# Patient Record
Sex: FEMALE | Race: WHITE | NOT HISPANIC OR LATINO | Employment: PART TIME | URBAN - METROPOLITAN AREA
[De-identification: names, ages, dates, MRNs, and addresses within clinical notes are randomized per-mention and may not be internally consistent; named-entity substitution may affect disease eponyms.]

---

## 2017-10-13 LAB — EXTERNAL HIV SCREEN: NORMAL

## 2018-04-23 LAB
CREATININE, RANDOM URINE (HISTORICAL): 141.9 MG/DL (ref 50–200)
PROT UR-MCNC: 24 MG/DL
PROT/CREAT UR: 169 MG/G

## 2018-04-25 LAB
ABSOL LYMPHOCYTES (HISTORICAL): 2.8 K/UL (ref 0.5–4)
ALBUMIN SERPL BCP-MCNC: 3.2 G/DL (ref 3–5.2)
ALP SERPL-CCNC: 163 U/L (ref 43–122)
ALT SERPL W P-5'-P-CCNC: 24 U/L (ref 9–52)
ANION GAP SERPL CALCULATED.3IONS-SCNC: 11 MMOL/L (ref 5–14)
AST SERPL W P-5'-P-CCNC: 19 U/L (ref 14–36)
BASOPHILS # BLD AUTO: 0 % (ref 0–1)
BASOPHILS # BLD AUTO: 0 K/UL (ref 0–0.1)
BILIRUB SERPL-MCNC: 0.2 MG/DL
BUN SERPL-MCNC: 10 MG/DL (ref 5–25)
CALCIUM SERPL-MCNC: 9.2 MG/DL (ref 8.4–10.2)
CHLORIDE SERPL-SCNC: 105 MEQ/L (ref 97–108)
CO2 SERPL-SCNC: 19 MMOL/L (ref 22–30)
CREATINE, SERUM (HISTORICAL): 0.7 MG/DL (ref 0.6–1.2)
DEPRECATED RDW RBC AUTO: 13.2 %
EGFR (HISTORICAL): >60 ML/MIN/1.73 M2
EOSINOPHIL # BLD AUTO: 0.1 K/UL (ref 0–0.4)
EOSINOPHIL NFR BLD AUTO: 1 % (ref 0–6)
GLUCOSE SERPL-MCNC: 123 MG/DL (ref 70–99)
HCT VFR BLD AUTO: 31.6 % (ref 36–46)
HGB BLD-MCNC: 10.8 G/DL (ref 12–16)
LYMPHOCYTES NFR BLD AUTO: 21 % (ref 25–45)
MCH RBC QN AUTO: 31 PG (ref 26–34)
MCHC RBC AUTO-ENTMCNC: 34.1 % (ref 31–36)
MCV RBC AUTO: 91 FL (ref 80–100)
MONOCYTES # BLD AUTO: 0.7 K/UL (ref 0.2–0.9)
MONOCYTES NFR BLD AUTO: 6 % (ref 1–10)
NEUTROPHILS ABS COUNT (HISTORICAL): 10 K/UL (ref 1.8–7.8)
NEUTS SEG NFR BLD AUTO: 72 % (ref 45–65)
PLATELET # BLD AUTO: 278 K/MCL (ref 150–450)
POTASSIUM SERPL-SCNC: 3.9 MEQ/L (ref 3.6–5)
RBC # BLD AUTO: 3.47 M/MCL (ref 4–5.2)
SODIUM SERPL-SCNC: 135 MEQ/L (ref 137–147)
TOTAL PROTEIN (HISTORICAL): 5.6 G/DL (ref 5.9–8.4)
WBC # BLD AUTO: 13.7 K/MCL (ref 4.5–11)

## 2018-10-04 ENCOUNTER — OFFICE VISIT (OUTPATIENT)
Dept: FAMILY MEDICINE CLINIC | Facility: CLINIC | Age: 28
End: 2018-10-04
Payer: COMMERCIAL

## 2018-10-04 VITALS
SYSTOLIC BLOOD PRESSURE: 140 MMHG | WEIGHT: 238 LBS | HEIGHT: 69 IN | OXYGEN SATURATION: 97 % | BODY MASS INDEX: 35.25 KG/M2 | HEART RATE: 68 BPM | DIASTOLIC BLOOD PRESSURE: 84 MMHG | RESPIRATION RATE: 16 BRPM | TEMPERATURE: 97.9 F

## 2018-10-04 DIAGNOSIS — Z86.59 HISTORY OF POSTPARTUM DEPRESSION: Primary | ICD-10-CM

## 2018-10-04 DIAGNOSIS — E66.01 CLASS 2 SEVERE OBESITY DUE TO EXCESS CALORIES WITH SERIOUS COMORBIDITY AND BODY MASS INDEX (BMI) OF 35.0 TO 35.9 IN ADULT (HCC): ICD-10-CM

## 2018-10-04 DIAGNOSIS — Z23 NEED FOR INFLUENZA VACCINATION: ICD-10-CM

## 2018-10-04 DIAGNOSIS — Z87.59 HISTORY OF POSTPARTUM DEPRESSION: Primary | ICD-10-CM

## 2018-10-04 DIAGNOSIS — I10 CHRONIC HYPERTENSION: ICD-10-CM

## 2018-10-04 PROBLEM — E66.9 CLASS 2 OBESITY IN ADULT: Status: ACTIVE | Noted: 2018-10-04

## 2018-10-04 PROBLEM — O14.13 PREECLAMPSIA, SEVERE, THIRD TRIMESTER: Status: ACTIVE | Noted: 2018-05-07

## 2018-10-04 PROBLEM — E66.812 CLASS 2 OBESITY IN ADULT: Status: ACTIVE | Noted: 2018-10-04

## 2018-10-04 PROCEDURE — 90682 RIV4 VACC RECOMBINANT DNA IM: CPT

## 2018-10-04 PROCEDURE — 90471 IMMUNIZATION ADMIN: CPT

## 2018-10-04 PROCEDURE — 99214 OFFICE O/P EST MOD 30 MIN: CPT | Performed by: FAMILY MEDICINE

## 2018-10-04 RX ORDER — LOSARTAN POTASSIUM 100 MG/1
100 TABLET ORAL DAILY
Qty: 30 TABLET | Refills: 1 | Status: SHIPPED | OUTPATIENT
Start: 2018-10-04 | End: 2018-12-10 | Stop reason: SDUPTHER

## 2018-10-04 RX ORDER — LABETALOL 100 MG/1
100 TABLET, FILM COATED ORAL
COMMUNITY
Start: 2018-05-10 | End: 2018-10-04 | Stop reason: ALTCHOICE

## 2018-10-04 RX ORDER — ESCITALOPRAM OXALATE 10 MG/1
10 TABLET ORAL DAILY
Qty: 30 TABLET | Refills: 0 | Status: SHIPPED | OUTPATIENT
Start: 2018-10-04 | End: 2018-12-10 | Stop reason: SDUPTHER

## 2018-10-04 RX ORDER — DIPHENOXYLATE HYDROCHLORIDE AND ATROPINE SULFATE 2.5; .025 MG/1; MG/1
1 TABLET ORAL
COMMUNITY
End: 2019-11-27

## 2018-10-04 NOTE — ASSESSMENT & PLAN NOTE
Symptomatic start the patient on Lexapro 10 mg once a day proper use of medication possible side effect discussed with the patient

## 2018-10-04 NOTE — PATIENT INSTRUCTIONS

## 2018-10-04 NOTE — ASSESSMENT & PLAN NOTE
Patient on labetalol with the patient history of fatigue and depression will stop the medication will put her on the losartan 100 mg once a day proper use of medication discussed with the patient will check her BMP in 2-3 week

## 2018-10-04 NOTE — ASSESSMENT & PLAN NOTE
Chronic uncontrolled was multiple trial of different kind of diet will start the patient on Belviq 10 mg 1 tablet twice a day proper use of medication discussed with the patient we encouraged patient to continue with the low carb diet increase activity 30 min a day 5 days a week

## 2018-10-04 NOTE — PROGRESS NOTES
Subjective:   Chief Complaint   Patient presents with    Obesity     wants something to help lose         Patient ID: Mitra Mendez is a 25 y o  female  Patient here with multiple concern  1-patient concerned about her weight she has been gaining weight despite she been trying to lose weight by following different kind of diet and doing some activity patient she had 5 from months old baby but the patient she gain more than 15 lb even from her weight when she was pregnant and deny any the dry skin no tremor and no headache no blurred vision no weakness or lateralized of the symptom  2-the patient for more than couple months and since then she had the baby and patient she had history of postpartum depression previously she been trying to deal with it and treated to conservatively was exercise and activity but is not helping affect her quality of the life patient deny any suicide attempt or idea she deny any ideas to hurt herself or the baby patient was prescribed the Zoloft by her Ob and she took for couple months and make her more depressed so patient she stop it        The following portions of the patient's history were reviewed and updated as appropriate: allergies, current medications, past family history, past medical history, past social history, past surgical history and problem list     Review of Systems   Constitutional: Negative for fatigue and fever  HENT: Negative for ear pain, sinus pain, sinus pressure and sore throat  Eyes: Negative for pain and redness  Respiratory: Negative for cough, chest tightness and shortness of breath  Cardiovascular: Negative for chest pain, palpitations and leg swelling  Gastrointestinal: Negative for abdominal pain, blood in stool, constipation, diarrhea and nausea  Genitourinary: Negative for flank pain, frequency and hematuria  Musculoskeletal: Negative for back pain and joint swelling  Skin: Negative for rash     Neurological: Negative for dizziness, numbness and headaches  Hematological: Does not bruise/bleed easily  Psychiatric/Behavioral: Negative for agitation and behavioral problems  Objective:  Vitals:    10/04/18 1402   BP: 140/84   BP Location: Left arm   Patient Position: Sitting   Cuff Size: Large   Pulse: 68   Resp: 16   Temp: 97 9 °F (36 6 °C)   TempSrc: Oral   SpO2: 97%   Weight: 108 kg (238 lb)   Height: 5' 9" (1 753 m)      Physical Exam   Constitutional: She is oriented to person, place, and time  She appears well-developed and well-nourished  HENT:   Head: Normocephalic  Right Ear: External ear normal    Left Ear: External ear normal    Eyes: Conjunctivae and EOM are normal  Right eye exhibits no discharge  Left eye exhibits no discharge  Neck: No JVD present  Cardiovascular: Normal rate, regular rhythm and normal heart sounds  Exam reveals no gallop  No murmur heard  Pulmonary/Chest: Effort normal  No respiratory distress  She has no wheezes  She has no rales  She exhibits no tenderness  Abdominal: She exhibits no mass  There is no tenderness  There is no rebound  Musculoskeletal: She exhibits no edema or tenderness  Neurological: She is alert and oriented to person, place, and time  Skin: No rash noted  No erythema           Assessment/Plan:    History of postpartum depression  Symptomatic start the patient on Lexapro 10 mg once a day proper use of medication possible side effect discussed with the patient    Chronic hypertension  Patient on labetalol with the patient history of fatigue and depression will stop the medication will put her on the losartan 100 mg once a day proper use of medication discussed with the patient will check her BMP in 2-3 week    Class 2 obesity in adult  Chronic uncontrolled was multiple trial of different kind of diet will start the patient on Belviq 10 mg 1 tablet twice a day proper use of medication discussed with the patient we encouraged patient to continue with the low carb diet increase activity 30 min a day 5 days a week       Diagnoses and all orders for this visit:    History of postpartum depression  -     escitalopram (LEXAPRO) 10 mg tablet; Take 1 tablet (10 mg total) by mouth daily    Class 2 severe obesity due to excess calories with serious comorbidity and body mass index (BMI) of 35 0 to 35 9 in adult ([facility])  -     Lorcaserin HCl (BELVIQ) 10 MG TABS; Take 1 tablet (10 mg total) by mouth 2 (two) times a day  -     CBC and differential; Future  -     Comprehensive metabolic panel; Future  -     Lipid Panel with Direct LDL reflex; Future  -     TSH, 3rd generation with Free T4 reflex; Future    Need for influenza vaccination  -     influenza vaccine, [id number], quadrivalent, recombinant, PF, 0 5 mL, for patients 18-49 yr with comorbidities ([facility])    Chronic hypertension  -     losartan (COZAAR) 100 MG tablet; Take 1 tablet (100 mg total) by mouth daily  -     CBC and differential; Future  -     Comprehensive metabolic panel; Future  -     Lipid Panel with Direct LDL reflex; Future  -     TSH, 3rd generation with Free T4 reflex; Future    Other orders  -     Discontinue: labetalol (NORMODYNE) 100 mg tablet;  Take 100 mg by mouth  -     multivitamin (THERAGRAN) TABS; Take 1 tablet by mouth

## 2018-10-08 ENCOUNTER — TRANSCRIBE ORDERS (OUTPATIENT)
Dept: ADMINISTRATIVE | Facility: HOSPITAL | Age: 28
End: 2018-10-08

## 2018-10-08 ENCOUNTER — APPOINTMENT (OUTPATIENT)
Dept: LAB | Facility: HOSPITAL | Age: 28
End: 2018-10-08
Payer: COMMERCIAL

## 2018-10-08 DIAGNOSIS — E66.01 CLASS 2 SEVERE OBESITY DUE TO EXCESS CALORIES WITH SERIOUS COMORBIDITY AND BODY MASS INDEX (BMI) OF 35.0 TO 35.9 IN ADULT (HCC): ICD-10-CM

## 2018-10-08 DIAGNOSIS — I10 CHRONIC HYPERTENSION: ICD-10-CM

## 2018-10-08 LAB
ALBUMIN SERPL BCP-MCNC: 3.9 G/DL (ref 3–5.2)
ALP SERPL-CCNC: 90 U/L (ref 43–122)
ALT SERPL W P-5'-P-CCNC: 40 U/L (ref 9–52)
ANION GAP SERPL CALCULATED.3IONS-SCNC: 7 MMOL/L (ref 5–14)
AST SERPL W P-5'-P-CCNC: 30 U/L (ref 14–36)
BASOPHILS # BLD AUTO: 0 THOUSANDS/ΜL (ref 0–0.1)
BASOPHILS NFR BLD AUTO: 1 % (ref 0–1)
BILIRUB SERPL-MCNC: 0.3 MG/DL
BUN SERPL-MCNC: 17 MG/DL (ref 5–25)
CALCIUM SERPL-MCNC: 8.9 MG/DL (ref 8.4–10.2)
CHLORIDE SERPL-SCNC: 107 MMOL/L (ref 97–108)
CHOLEST SERPL-MCNC: 186 MG/DL
CO2 SERPL-SCNC: 24 MMOL/L (ref 22–30)
CREAT SERPL-MCNC: 0.71 MG/DL (ref 0.6–1.2)
EOSINOPHIL # BLD AUTO: 0.2 THOUSAND/ΜL (ref 0–0.4)
EOSINOPHIL NFR BLD AUTO: 2 % (ref 0–6)
ERYTHROCYTE [DISTWIDTH] IN BLOOD BY AUTOMATED COUNT: 12.6 %
GFR SERPL CREATININE-BSD FRML MDRD: 115 ML/MIN/1.73SQ M
GLUCOSE P FAST SERPL-MCNC: 97 MG/DL (ref 70–99)
HCT VFR BLD AUTO: 39.7 % (ref 36–46)
HDLC SERPL-MCNC: 36 MG/DL (ref 40–59)
HGB BLD-MCNC: 13.4 G/DL (ref 12–16)
LDLC SERPL CALC-MCNC: 119 MG/DL
LYMPHOCYTES # BLD AUTO: 2.8 THOUSANDS/ΜL (ref 0.5–4)
LYMPHOCYTES NFR BLD AUTO: 28 % (ref 20–50)
MCH RBC QN AUTO: 31.8 PG (ref 26–34)
MCHC RBC AUTO-ENTMCNC: 33.9 G/DL (ref 31–36)
MCV RBC AUTO: 94 FL (ref 80–100)
MONOCYTES # BLD AUTO: 0.6 THOUSAND/ΜL (ref 0.2–0.9)
MONOCYTES NFR BLD AUTO: 6 % (ref 1–10)
NEUTROPHILS # BLD AUTO: 6.1 THOUSANDS/ΜL (ref 1.8–7.8)
NEUTS SEG NFR BLD AUTO: 63 % (ref 45–65)
PLATELET # BLD AUTO: 283 THOUSANDS/UL (ref 150–450)
PLATELET BLD QL SMEAR: ADEQUATE
PMV BLD AUTO: 9.3 FL (ref 8.9–12.7)
POTASSIUM SERPL-SCNC: 4.2 MMOL/L (ref 3.6–5)
PROT SERPL-MCNC: 6.6 G/DL (ref 5.9–8.4)
RBC # BLD AUTO: 4.23 MILLION/UL (ref 4–5.2)
RBC MORPH BLD: NORMAL
SODIUM SERPL-SCNC: 138 MMOL/L (ref 137–147)
TRIGL SERPL-MCNC: 156 MG/DL
TSH SERPL DL<=0.05 MIU/L-ACNC: 0.83 UIU/ML (ref 0.47–4.68)
WBC # BLD AUTO: 9.7 THOUSAND/UL (ref 4.5–11)

## 2018-10-08 PROCEDURE — 80053 COMPREHEN METABOLIC PANEL: CPT

## 2018-10-08 PROCEDURE — 84443 ASSAY THYROID STIM HORMONE: CPT

## 2018-10-08 PROCEDURE — 36415 COLL VENOUS BLD VENIPUNCTURE: CPT

## 2018-10-08 PROCEDURE — 85025 COMPLETE CBC W/AUTO DIFF WBC: CPT

## 2018-10-08 PROCEDURE — 80061 LIPID PANEL: CPT

## 2018-11-01 PROBLEM — N20.0 KIDNEY STONE: Status: ACTIVE | Noted: 2018-01-26

## 2018-11-05 ENCOUNTER — OFFICE VISIT (OUTPATIENT)
Dept: FAMILY MEDICINE CLINIC | Facility: CLINIC | Age: 28
End: 2018-11-05
Payer: COMMERCIAL

## 2018-11-05 VITALS
DIASTOLIC BLOOD PRESSURE: 70 MMHG | BODY MASS INDEX: 36.07 KG/M2 | WEIGHT: 238 LBS | OXYGEN SATURATION: 98 % | SYSTOLIC BLOOD PRESSURE: 120 MMHG | HEIGHT: 68 IN | TEMPERATURE: 98.1 F | HEART RATE: 78 BPM

## 2018-11-05 DIAGNOSIS — E66.01 CLASS 2 SEVERE OBESITY DUE TO EXCESS CALORIES WITH SERIOUS COMORBIDITY AND BODY MASS INDEX (BMI) OF 35.0 TO 35.9 IN ADULT (HCC): Primary | ICD-10-CM

## 2018-11-05 DIAGNOSIS — I10 CHRONIC HYPERTENSION: ICD-10-CM

## 2018-11-05 PROCEDURE — 3074F SYST BP LT 130 MM HG: CPT | Performed by: FAMILY MEDICINE

## 2018-11-05 PROCEDURE — 99214 OFFICE O/P EST MOD 30 MIN: CPT | Performed by: FAMILY MEDICINE

## 2018-11-05 PROCEDURE — 3078F DIAST BP <80 MM HG: CPT | Performed by: FAMILY MEDICINE

## 2018-11-05 PROCEDURE — 3008F BODY MASS INDEX DOCD: CPT | Performed by: FAMILY MEDICINE

## 2018-11-05 RX ORDER — MOMETASONE FUROATE 50 UG/1
SPRAY, METERED NASAL
COMMUNITY
Start: 2018-01-26 | End: 2019-04-10 | Stop reason: SDUPTHER

## 2018-11-05 NOTE — PROGRESS NOTES
Subjective:   Chief Complaint   Patient presents with    Follow-up     medication change        Patient ID: Nelson Grayson is a 25 y o  female  Patient follow-up with a chronic condition patient's history of hypertension we did stop her labetalol secondary side effect include the depression and fatigue and will put her on losartan 100 mg patient tolerated the medication well without any side effect and the blood pressure well controlled deny any chest pain short of breath no palpitation no headache no blurred vision no weakness or lateralized of the symptom  Patient's history of obesity we did start the patient on Belviq 10 mg twice a day the patient insurance does not cover the medication and the with the cost it is high patient could not start the medication the patient thus treated with her weight tried different kind of diet nothing helped and the even try to do exercise and also did not help        The following portions of the patient's history were reviewed and updated as appropriate: allergies, current medications, past family history, past medical history, past social history, past surgical history and problem list     Review of Systems   Constitutional: Negative for fatigue and fever  HENT: Negative for ear pain, sinus pain, sinus pressure and sore throat  Eyes: Negative for pain and redness  Respiratory: Negative for cough, chest tightness and shortness of breath  Cardiovascular: Negative for chest pain, palpitations and leg swelling  Gastrointestinal: Negative for abdominal pain, blood in stool, constipation, diarrhea and nausea  Genitourinary: Negative for flank pain, frequency and hematuria  Musculoskeletal: Negative for back pain and joint swelling  Skin: Negative for rash  Neurological: Negative for dizziness, numbness and headaches  Hematological: Does not bruise/bleed easily           Objective:  Vitals:    11/05/18 1319   BP: 120/70   Pulse: 78   Temp: 98 1 °F (36 7 °C) TempSrc: Oral   SpO2: 98%   Weight: 108 kg (238 lb)   Height: 5' 8" (1 727 m)      Physical Exam   Constitutional: She is oriented to person, place, and time  She appears well-developed and well-nourished  HENT:   Head: Normocephalic  Right Ear: External ear normal    Left Ear: External ear normal    Eyes: Conjunctivae and EOM are normal  Right eye exhibits no discharge  Left eye exhibits no discharge  Neck: No JVD present  Cardiovascular: Normal rate, regular rhythm and normal heart sounds  Exam reveals no gallop  No murmur heard  Pulmonary/Chest: Effort normal  No respiratory distress  She has no wheezes  She has no rales  She exhibits no tenderness  Abdominal: She exhibits no mass  There is no tenderness  There is no rebound  Musculoskeletal: She exhibits no edema or tenderness  Neurological: She is alert and oriented to person, place, and time  Skin: No rash noted  No erythema  Assessment/Plan:    Class 2 obesity in adult  Chronic uncontrolled patient could not take care Belviq secondary to the cost of the medication and patient BMI above 35 and she had comorbidity hypertension the patient will qualify for bariatric surgery will refer the patient to bariatric team we encouraged patient increased activity 30 minutes a day 5 days a week and a portion control    Chronic hypertension  Chronic well controlled continue current the patient tolerate the losartan without side effect blood pressure well controlled will continue current dose   low-salt diet less than 2 g a day ,   low caffeine intake   regular aerobic exercise 20 to totally minute a day diet and important lose weight discussed with the patient         Diagnoses and all orders for this visit:    Class 2 severe obesity due to excess calories with serious comorbidity and body mass index (BMI) of 35 0 to 35 9 in adult [facility])  -     Ambulatory referral to Bariatric Surgery;  Future    Chronic hypertension  -     Basic metabolic panel; Future    Other orders  -     mometasone (NASONEX) 50 mcg/act nasal spray; spray 2 spray by intranasal route  every day in each nostril

## 2018-11-06 NOTE — ASSESSMENT & PLAN NOTE
Chronic uncontrolled patient could not take care Belviq secondary to the cost of the medication and patient BMI above 35 and she had comorbidity hypertension the patient will qualify for bariatric surgery will refer the patient to bariatric team we encouraged patient increased activity 30 minutes a day 5 days a week and a portion control

## 2018-11-06 NOTE — ASSESSMENT & PLAN NOTE
Chronic well controlled continue current the patient tolerate the losartan without side effect blood pressure well controlled will continue current dose   low-salt diet less than 2 g a day ,   low caffeine intake   regular aerobic exercise 20 to totally minute a day diet and important lose weight discussed with the patient

## 2018-12-10 DIAGNOSIS — Z86.59 HISTORY OF POSTPARTUM DEPRESSION: ICD-10-CM

## 2018-12-10 DIAGNOSIS — I10 CHRONIC HYPERTENSION: ICD-10-CM

## 2018-12-10 DIAGNOSIS — Z87.59 HISTORY OF POSTPARTUM DEPRESSION: ICD-10-CM

## 2018-12-10 RX ORDER — ESCITALOPRAM OXALATE 10 MG/1
10 TABLET ORAL DAILY
Qty: 30 TABLET | Refills: 0 | Status: SHIPPED | OUTPATIENT
Start: 2018-12-10 | End: 2019-04-02 | Stop reason: SDUPTHER

## 2018-12-10 RX ORDER — LOSARTAN POTASSIUM 100 MG/1
100 TABLET ORAL DAILY
Qty: 30 TABLET | Refills: 0 | Status: SHIPPED | OUTPATIENT
Start: 2018-12-10 | End: 2019-04-02 | Stop reason: SDUPTHER

## 2018-12-21 ENCOUNTER — OFFICE VISIT (OUTPATIENT)
Dept: FAMILY MEDICINE CLINIC | Facility: CLINIC | Age: 28
End: 2018-12-21
Payer: COMMERCIAL

## 2018-12-21 VITALS
HEART RATE: 88 BPM | BODY MASS INDEX: 35.55 KG/M2 | HEIGHT: 69 IN | DIASTOLIC BLOOD PRESSURE: 94 MMHG | SYSTOLIC BLOOD PRESSURE: 146 MMHG | WEIGHT: 240 LBS | TEMPERATURE: 98.1 F | OXYGEN SATURATION: 97 % | RESPIRATION RATE: 16 BRPM

## 2018-12-21 DIAGNOSIS — I10 CHRONIC HYPERTENSION: ICD-10-CM

## 2018-12-21 DIAGNOSIS — R06.83 SNORING: ICD-10-CM

## 2018-12-21 DIAGNOSIS — Z00.01 ENCOUNTER FOR WELL ADULT EXAM WITH ABNORMAL FINDINGS: Primary | ICD-10-CM

## 2018-12-21 DIAGNOSIS — Z72.0 TOBACCO ABUSE: ICD-10-CM

## 2018-12-21 DIAGNOSIS — Z23 NEED FOR 23-POLYVALENT PNEUMOCOCCAL POLYSACCHARIDE VACCINE: ICD-10-CM

## 2018-12-21 DIAGNOSIS — E66.01 CLASS 2 SEVERE OBESITY DUE TO EXCESS CALORIES WITH SERIOUS COMORBIDITY AND BODY MASS INDEX (BMI) OF 35.0 TO 35.9 IN ADULT (HCC): ICD-10-CM

## 2018-12-21 PROCEDURE — 99214 OFFICE O/P EST MOD 30 MIN: CPT | Performed by: FAMILY MEDICINE

## 2018-12-21 PROCEDURE — 99406 BEHAV CHNG SMOKING 3-10 MIN: CPT | Performed by: FAMILY MEDICINE

## 2018-12-21 PROCEDURE — 99395 PREV VISIT EST AGE 18-39: CPT | Performed by: FAMILY MEDICINE

## 2018-12-21 PROCEDURE — 3008F BODY MASS INDEX DOCD: CPT | Performed by: FAMILY MEDICINE

## 2018-12-21 PROCEDURE — 90471 IMMUNIZATION ADMIN: CPT | Performed by: FAMILY MEDICINE

## 2018-12-21 PROCEDURE — 3725F SCREEN DEPRESSION PERFORMED: CPT | Performed by: FAMILY MEDICINE

## 2018-12-21 PROCEDURE — 90732 PPSV23 VACC 2 YRS+ SUBQ/IM: CPT | Performed by: FAMILY MEDICINE

## 2018-12-21 RX ORDER — AMLODIPINE BESYLATE 5 MG/1
5 TABLET ORAL DAILY
Qty: 30 TABLET | Refills: 1 | Status: SHIPPED | OUTPATIENT
Start: 2018-12-21 | End: 2019-04-02 | Stop reason: SDUPTHER

## 2018-12-21 NOTE — PROGRESS NOTES
[facility] HEALTH MAINTENANCE OFFICE VISIT  [facility]    NAME: Nelson Grayson  AGE: 25 y o  SEX: female  : 1990     DATE: 2018    Assessment and Plan     Problem List Items Addressed This Visit     Chronic hypertension     Chronic asymptomatic uncontrolled patient on losartan 100 mg once a day will add amlodipine 5 mg once a day proper use of medication possible side effect discussed with the patient we encouraged patient to lose weight and watch for low-salt diet         Relevant Medications    amLODIPine (NORVASC) 5 mg tablet    Other Relevant Orders    Ambulatory referral to Bariatric Surgery    Class 2 obesity in adult     The BMI is above average will refer the patient to bariatric team patient BMI above 35 and had comorbidity including hypertension   BMI counseling and education was provided to the patient  Nutrition recommendations include reducing portion sizes, decreasing overall calorie intake, 3-5 servings of fruits/vegetables daily, reducing fast food intake, consuming healthier snacks, decreasing soda and/or juice intake, moderation in carbohydrate intake and reducing intake of saturated fat and trans fat  Exercise recommendations include moderate aerobic physical activity for 150 minutes/week, exercising 3-5 times per week and joining a gym           Relevant Orders    Ambulatory referral to Sleep Medicine    Ambulatory referral to Bariatric Surgery    Snoring     Symptomatic plan to do a sleep study to rule out sleep apnea we discussed with the patient important lose weight and also we discussed with the patient a sleep position         Relevant Orders    Ambulatory referral to Sleep Medicine    Encounter for well adult exam with abnormal findings - Primary     Advice and education were given regarding nutrition, aerobic exercises, weight bearing exercises, cardiovascular risk reduction, fall risk reduction, and age appropriate supplements  The patient was counseled regarding instructions for management, risk factor reductions, prognosis, risks and benefits of treatment options, patient and family education, and importance of compliance with treatment  Tobacco abuse     Chronic patient has been smoking for more than 10 years patient did try to quit smoking before and [place] and the she went through stress in life and the restart smoke again at deny any chest pain short of breath her we discussed with the patient important stop smoking and the side effect of smoking her health increase the risk for multiple kind of cancer we offer her nicotine patch and she declined for today we spent more than 4 min discussed with the patient smoking cessation           Other Visit Diagnoses     Need for 23-polyvalent pneumococcal polysaccharide vaccine        Relevant Orders    Pneumococcal Polysaccharide Vaccine 23-Valent =>1yo SQ IM (Completed)            · Patient Counseling:   · Nutrition: Stressed importance of a well balanced diet, moderation of sodium/saturated fat, caloric balance and sufficient intake of fiber  · Exercise: Stressed the importance of regular exercise with a goal of 150 minutes per week  · Dental Health: Discussed daily flossing and brushing and regular dental visits     · Immunizations reviewed patient is due for Pneumovax 23  · Discussed benefits of screening The patient had a Pap smear in 2017 and was within normal limits  BMI Counseling: Body mass index is 35 44 kg/m²  Discussed with patient's BMI with her            Chief Complaint     Chief Complaint   Patient presents with    Follow-up     chronic conditions     Physical Exam     yearly        History of Present Illness     Patient office for annual physical exam and concerned about the snoring and high blood pressure  Deny any chest pain short of breath no palpitation no headache no blurred vision no weakness or lateralized of the symptom no abdomen pain no nausea vomiting or diarrhea no renal problem no rash no fever no change in the weight and no change in the mood patient does do low carb diet does not do exercise frequently she does smoke half pack a day for more than 10 years she did try to quit smoke before using addition DEXA but the she relapsed        Well Adult Physical   Patient here for a comprehensive physical exam       Diet and Physical Activity  Diet:   low carb diet  Weight concerns: Patient has class 2 obesity (BMI 35 0-39  9)  Exercise: infrequently      Depression Screen  PHQ-9 Depression Screening    PHQ-9:    Frequency of the following problems over the past two weeks:       Little interest or pleasure in doing things:  0 - not at all  Feeling down, depressed, or hopeless:  0 - not at all  PHQ-2 Score:  0          General Health  Hearing: Normal:  bilateral  Vision: wears glasses  Dental: regular dental visits    Reproductive Health  LMP was 11/27/18  G4L3      The following portions of the patient's history were reviewed and updated as appropriate: allergies, current medications, past family history, past medical history, past social history, past surgical history and problem list     Review of Systems     Review of Systems   Constitutional: Negative for fatigue and fever  HENT: Negative for ear pain, sinus pain, sinus pressure and sore throat  Eyes: Negative for pain and redness  Respiratory: Negative for cough, chest tightness and shortness of breath  Cardiovascular: Negative for chest pain, palpitations and leg swelling  Gastrointestinal: Negative for abdominal pain, blood in stool, constipation, diarrhea and nausea  Genitourinary: Negative for flank pain, frequency and hematuria  Musculoskeletal: Negative for back pain and joint swelling  Skin: Negative for rash  Neurological: Negative for dizziness, numbness and headaches  Hematological: Does not bruise/bleed easily     Psychiatric/Behavioral: Negative for agitation and behavioral problems  Past Medical History     Past Medical History:   Diagnosis Date    Hypertension     Kidney stone     Obesity        Past Surgical History     History reviewed  No pertinent surgical history  Social History     Social History     Social History    Marital status: Significant Other     Spouse name: N/A    Number of children: N/A    Years of education: N/A     Social History Main Topics    Smoking status: Heavy Tobacco Smoker     Packs/day: 0 50     Types: Cigarettes    Smokeless tobacco: Current User    Alcohol use Yes    Drug use: No    Sexual activity: Not Asked     Other Topics Concern    None     Social History Narrative    None       Family History     Family History   Problem Relation Age of Onset    Hypertension Family        Current Medications       Current Outpatient Prescriptions:     escitalopram (LEXAPRO) 10 mg tablet, Take 1 tablet (10 mg total) by mouth daily, Disp: 30 tablet, Rfl: 0    losartan (COZAAR) 100 MG tablet, Take 1 tablet (100 mg total) by mouth daily, Disp: 30 tablet, Rfl: 0    mometasone (NASONEX) 50 mcg/act nasal spray, spray 2 spray by intranasal route  every day in each nostril, Disp: , Rfl:     multivitamin (THERAGRAN) TABS, Take 1 tablet by mouth, Disp: , Rfl:     amLODIPine (NORVASC) 5 mg tablet, Take 1 tablet (5 mg total) by mouth daily, Disp: 30 tablet, Rfl: 1     Allergies     No Known Allergies    Objective     /94 (BP Location: Left arm, Patient Position: Sitting, Cuff Size: Large)   Pulse 88   Temp 98 1 °F (36 7 °C) (Oral)   Resp 16   Ht 5' 9" (1 753 m)   Wt 109 kg (240 lb)   SpO2 97%   BMI 35 44 kg/m²      Physical Exam   Constitutional: She is oriented to person, place, and time  She appears well-developed and well-nourished  HENT:   Head: Normocephalic  Right Ear: External ear normal    Left Ear: External ear normal    Eyes: Conjunctivae and EOM are normal  Right eye exhibits no discharge   Left eye exhibits no discharge  Neck: No JVD present  Cardiovascular: Normal rate, regular rhythm and normal heart sounds  Exam reveals no gallop  No murmur heard  Pulmonary/Chest: Effort normal  No respiratory distress  She has no wheezes  She has no rales  She exhibits no tenderness  Abdominal: She exhibits no mass  There is no tenderness  There is no rebound  Musculoskeletal: She exhibits no edema or tenderness  Neurological: She is alert and oriented to person, place, and time  Skin: No rash noted  No erythema  Psychiatric: She has a normal mood and affect   Her behavior is normal            Health Maintenance     Health Maintenance   Topic Date Due    Depression Screening PHQ  12/21/2019    DTaP,Tdap,and Td Vaccines (2 - Td) 02/12/2028    INFLUENZA VACCINE  Completed    Pneumococcal PPSV23 Medium Risk Adult  Completed     Immunization History   Administered Date(s) Administered    Influenza 10/10/2017    Influenza, recombinant, quadrivalent,injectable, preservative free 10/04/2018    Pneumococcal Polysaccharide PPV23 12/21/2018    Tdap 02/12/2018       Lexie Jones MD  [address] [place] [address]

## 2018-12-21 NOTE — PROGRESS NOTES
Subjective:   Chief Complaint   Patient presents with    Follow-up     chronic conditions     Physical Exam     yearly         Patient ID: Bonnie Dao is a 25 y o  female  Patient and office for her yearly exam and she is concerned about the the snoring and the blood pressure patient now has been taking losartan 100 mg once a day and her blood pressure has been running high she deny any chest pain short of breath no palpitation no headache no blurred vision no weakness or lateralized of the symptom no syncope and patient does smoke she is compliant with her medication and she had her BMI 35 4  Patient also concerned about snoring has been reported by the patient and her  does not matter what sleep position she does snore and the patient the sometimes wake up at nighttime gasping for ear feel tired during the day has been trying to lose weight to help with that was not successful deny any lose control of her urine or stool and a during sleeping no sleep walking no sleep talking        The following portions of the patient's history were reviewed and updated as appropriate: allergies, current medications, past family history, past medical history, past social history, past surgical history and problem list     Review of Systems   Constitutional: Negative for fatigue and fever  HENT: Negative for ear pain, sinus pain, sinus pressure and sore throat  Eyes: Negative for pain and redness  Respiratory: Negative for cough, chest tightness and shortness of breath  Cardiovascular: Negative for chest pain, palpitations and leg swelling  Gastrointestinal: Negative for abdominal pain, blood in stool, constipation, diarrhea and nausea  Genitourinary: Negative for flank pain, frequency and hematuria  Musculoskeletal: Negative for back pain and joint swelling  Skin: Negative for rash  Neurological: Negative for dizziness, numbness and headaches  Hematological: Does not bruise/bleed easily  Psychiatric/Behavioral: Negative for agitation and behavioral problems  Objective:  Vitals:    12/21/18 1411   BP: 146/94   BP Location: Left arm   Patient Position: Sitting   Cuff Size: Large   Pulse: 88   Resp: 16   Temp: 98 1 °F (36 7 °C)   TempSrc: Oral   SpO2: 97%   Weight: 109 kg (240 lb)   Height: 5' 9" (1 753 m)      Physical Exam   Constitutional: She is oriented to person, place, and time  She appears well-developed and well-nourished  HENT:   Head: Normocephalic  Right Ear: External ear normal    Left Ear: External ear normal    Eyes: Conjunctivae and EOM are normal  Right eye exhibits no discharge  Left eye exhibits no discharge  Neck: No JVD present  Cardiovascular: Normal rate, regular rhythm and normal heart sounds  Exam reveals no gallop  No murmur heard  Pulmonary/Chest: Effort normal  No respiratory distress  She has no wheezes  She has no rales  She exhibits no tenderness  Abdominal: She exhibits no mass  There is no tenderness  There is no rebound  Musculoskeletal: She exhibits no edema or tenderness  Neurological: She is alert and oriented to person, place, and time  Skin: No rash noted  No erythema  Psychiatric: She has a normal mood and affect  Assessment/Plan:    Chronic hypertension  Chronic asymptomatic uncontrolled patient on losartan 100 mg once a day will add amlodipine 5 mg once a day proper use of medication possible side effect discussed with the patient we encouraged patient to lose weight and watch for low-salt diet    Class 2 obesity in adult  The BMI is above average will refer the patient to bariatric team patient BMI above 35 and had comorbidity including hypertension   BMI counseling and education was provided to the patient   Nutrition recommendations include reducing portion sizes, decreasing overall calorie intake, 3-5 servings of fruits/vegetables daily, reducing fast food intake, consuming healthier snacks, decreasing soda and/or juice intake, moderation in carbohydrate intake and reducing intake of saturated fat and trans fat  Exercise recommendations include moderate aerobic physical activity for 150 minutes/week, exercising 3-5 times per week and joining a gym  Snoring  Symptomatic plan to do a sleep study to rule out sleep apnea we discussed with the patient important lose weight and also we discussed with the patient a sleep position    Encounter for well adult exam with abnormal findings  Advice and education were given regarding nutrition, aerobic exercises, weight bearing exercises, cardiovascular risk reduction, fall risk reduction, and age appropriate supplements  The patient was counseled regarding instructions for management, risk factor reductions, prognosis, risks and benefits of treatment options, patient and family education, and importance of compliance with treatment  Tobacco abuse  Chronic patient has been smoking for more than 10 years patient did try to quit smoking before and [place] and the she went through stress in life and the restart smoke again at deny any chest pain short of breath her we discussed with the patient important stop smoking and the side effect of smoking her health increase the risk for multiple kind of cancer we offer her nicotine patch and she declined for today we spent more than 4 min discussed with the patient smoking cessation       Diagnoses and all orders for this visit:    Encounter for well adult exam with abnormal findings    Chronic hypertension  -     amLODIPine (NORVASC) 5 mg tablet; Take 1 tablet (5 mg total) by mouth daily  -     Ambulatory referral to Bariatric Surgery; Future    Class 2 severe obesity due to excess calories with serious comorbidity and body mass index (BMI) of 35 0 to 35 9 in adult [facility])  -     Ambulatory referral to Sleep Medicine; Future  -     Ambulatory referral to Bariatric Surgery;  Future    Need for 23-polyvalent pneumococcal polysaccharide vaccine  -     Pneumococcal Polysaccharide Vaccine 23-Valent =>3yo SQ IM    Snoring  -     Ambulatory referral to Sleep Medicine;  Future    Tobacco abuse

## 2018-12-23 PROBLEM — Z00.01 ENCOUNTER FOR WELL ADULT EXAM WITH ABNORMAL FINDINGS: Status: ACTIVE | Noted: 2018-12-23

## 2018-12-23 PROBLEM — Z72.0 TOBACCO ABUSE: Status: ACTIVE | Noted: 2018-12-23

## 2018-12-23 NOTE — ASSESSMENT & PLAN NOTE
Chronic asymptomatic uncontrolled patient on losartan 100 mg once a day will add amlodipine 5 mg once a day proper use of medication possible side effect discussed with the patient we encouraged patient to lose weight and watch for low-salt diet

## 2018-12-23 NOTE — ASSESSMENT & PLAN NOTE
The BMI is above average will refer the patient to bariatric team patient BMI above 35 and had comorbidity including hypertension   BMI counseling and education was provided to the patient  Nutrition recommendations include reducing portion sizes, decreasing overall calorie intake, 3-5 servings of fruits/vegetables daily, reducing fast food intake, consuming healthier snacks, decreasing soda and/or juice intake, moderation in carbohydrate intake and reducing intake of saturated fat and trans fat  Exercise recommendations include moderate aerobic physical activity for 150 minutes/week, exercising 3-5 times per week and joining a gym

## 2018-12-23 NOTE — ASSESSMENT & PLAN NOTE
Chronic patient has been smoking for more than 10 years patient did try to quit smoking before and [place] and the she went through stress in life and the restart smoke again at deny any chest pain short of breath her we discussed with the patient important stop smoking and the side effect of smoking her health increase the risk for multiple kind of cancer we offer her nicotine patch and she declined for today we spent more than 4 min discussed with the patient smoking cessation

## 2018-12-23 NOTE — ASSESSMENT & PLAN NOTE
Symptomatic plan to do a sleep study to rule out sleep apnea we discussed with the patient important lose weight and also we discussed with the patient a sleep position

## 2019-01-03 ENCOUNTER — OFFICE VISIT (OUTPATIENT)
Dept: SLEEP CENTER | Facility: CLINIC | Age: 29
End: 2019-01-03
Payer: COMMERCIAL

## 2019-01-03 VITALS
SYSTOLIC BLOOD PRESSURE: 144 MMHG | WEIGHT: 241 LBS | HEIGHT: 69 IN | HEART RATE: 76 BPM | BODY MASS INDEX: 35.7 KG/M2 | DIASTOLIC BLOOD PRESSURE: 76 MMHG

## 2019-01-03 DIAGNOSIS — R06.89 GASPING FOR BREATH: ICD-10-CM

## 2019-01-03 DIAGNOSIS — R51.9 HEADACHE UPON AWAKENING: ICD-10-CM

## 2019-01-03 DIAGNOSIS — R06.83 SNORING: Primary | ICD-10-CM

## 2019-01-03 DIAGNOSIS — E66.01 CLASS 2 SEVERE OBESITY DUE TO EXCESS CALORIES WITH SERIOUS COMORBIDITY AND BODY MASS INDEX (BMI) OF 35.0 TO 35.9 IN ADULT (HCC): ICD-10-CM

## 2019-01-03 DIAGNOSIS — R06.81 WITNESSED EPISODE OF APNEA: ICD-10-CM

## 2019-01-03 PROCEDURE — 99244 OFF/OP CNSLTJ NEW/EST MOD 40: CPT | Performed by: PSYCHIATRY & NEUROLOGY

## 2019-01-03 NOTE — PATIENT INSTRUCTIONS
Recommendations:  1) HST with in lab PSG if non-diagnostic  2)  Driving safety was reviewed with patient  If the patient feels too sleepy to drive she knows not to drive  If she becomes sleepy while driving she will pull over and nap  3) Follow-up in clinic 6-8 weeks after initiating treatment if indicted   4) Call with any questions or concerns

## 2019-01-03 NOTE — PROGRESS NOTES
Sleep Medicine Consultation Note    HPI:  Ms Axel Smith is a 23yo F with HTN, obesity, hx of post partum depression, and hx of kidney stones is presenting for an evaluation of possible obstructive sleep apnea in the context of snoring  The patient stated that since having her baby 8 months ago, she has gained 40lbs  She has always snored but it has worsened since she was 6 months pregnant  She has a deviated septum which makes it difficult for her to breath at night  She is only able to breath through her left nostril  She used to not be able to breath on her back and on her left side  She also snored only in those positions in the past  Now, since gaining the weight, she is unable to breath even when she is on her right side  She now also snores on her right side, which was not the case before  Since she was 6 months pregnant, she has had nocturnal gasping, has had worsening snoring, and witness apneas  She feels like she is being suffocated when she is sleeping on her back  She uses affrin nightly and has used it for 12 years  She cannot sleep without it       Please see below for continuation of the HPI:      Sleep Disordered Breathing:  -Snoring: yes   -Severity: loud   -Frequency: every night   -Duration: years   -Over time: worsened   -Modifying factors: weight gain has made it worse  -Observed Apneas: yes  -Mouth Breathing at night: yes  -Dry Mouth in morning: yes   -Nocturnal Gasping: yes  -Nasal Obstruction: yes, see above  -Weight: gained 40lbs in 1 year    Sleep Pattern:  -Location: bedroom  -Bed/Recliner/Wedge: bed  -Bed Partner:   -HOB: flat  -# of pillows under head: 2  -Position: right side  -Bedtime: 930pm  -Lights out: tv is always on  -Environmental: TV on all night  -Latency: 30 mins  -Awakenings: at least 3 times   -Reason: unable to breath, snoring   -Duration: right back to sleep  -Wake time: 530am   -Alarm: yes  -Rise time: same time  -Days off: same time  -Shift Work: mornings M-F  -Patient's estimate of total sleep time: 4-5h    Daytime Symptoms:  -Upon Awakening: unrefreshed, not rested  -Daytime fatigue/sleepiness: tired and sleepy during the day  -Naps: daily nap for 40 mins at 130pm  -Involuntary Dozing: idle at home, she will doze off  Sitting and reading, watching TV  -Cognitive Symptoms: trouble concenrating  -Driving: Difficulty with sleepiness and driving:  Denied since pregnancy  When pregnant, she would have sleepiness while driving  Denied ever falling asleep  Possibly would close eyes at a red light  -- Close calls related to sleepiness: denied   -- Accidents related to sleepiness: denied      Questionnaires:   Sitting and reading: Moderate chance of dozing  Watching TV: High chance of dozing  Sitting, inactive in a public place (e g  a theatre or a meeting): Moderate chance of dozing  As a passenger in a car for an hour without a break: High chance of dozing  Lying down to rest in the afternoon when circumstances permit: High chance of dozing  Sitting and talking to someone: Would never doze  Sitting quietly after a lunch without alcohol: Slight chance of dozing  In a car, while stopped for a few minutes in traffic: Slight chance of dozing  Total score: 15      Sleep Review of Symptoms:  -Parasomnias:  --Sleep Walking: denied  --Dream Enactment: has reached for her phone when in her dream the phone rang  Not common     --Bruxism: yes, no guard  -Motor:  --RLS: denied  --PLMS: yes  -Narcolepsy:  --Hallucinations: denied  --Paralysis: denied  --Cataplexy: denied    Childhood Sleep History: sleep walking once, snoring    Prior Sleep Studies/Evaluations:  denied    Family History:  Family history of sleep disorders: mother snores, grandmother with EAMON    Patient Active Problem List   Diagnosis    Chronic hypertension    History of postpartum depression    Preeclampsia, severe, third trimester    Class 2 obesity in adult    Kidney stone    Snoring    Encounter for well adult exam with abnormal findings    Tobacco abuse   depression  Past Medical History:   Diagnosis Date    Hypertension     Kidney stone     Obesity      --> Denies any cardiopulmonary disease  --> Seizure hx: denies  --> Head injury with LOC: denies  --> Supplemental Oxygen Use: denies    Labs     Results for Leah Sanabria (MRN [id number]) as of 1/3/2019 14:59   Ref   Range 10/8/2018 08:14   eGFR Latest Ref Range: >60 ml/min/1 73sq m 115   Sodium Latest Ref Range: 137 - 147 mmol/L 138   Potassium Latest Ref Range: 3 6 - 5 0 mmol/L 4 2   Chloride Latest Ref Range: 97 - 108 mmol/L 107   CO2 Latest Ref Range: 22 - 30 mmol/L 24   Anion Gap Latest Ref Range: 5 - 14 mmol/L 7   BUN Latest Ref Range: 5 - 25 mg/dL 17   Creatinine Latest Ref Range: 0 60 - 1 20 mg/dL 0 71   GLUCOSE FASTING Latest Ref Range: 70 - 99 mg/dL 97   Calcium Latest Ref Range: 8 4 - 10 2 mg/dL 8 9   AST Latest Ref Range: 14 - 36 U/L 30   ALT Latest Ref Range: 9 - 52 U/L 40   Alkaline Phosphatase Latest Ref Range: 43 - 122 U/L 90   Total Protein Latest Ref Range: 5 9 - 8 4 g/dL 6 6   Albumin Latest Ref Range: 3 0 - 5 2 g/dL 3 9   TOTAL BILIRUBIN Latest Ref Range: <1 30 mg/dL 0 30   Cholesterol Latest Ref Range: <200 mg/dL 186   Triglycerides Latest Ref Range: <150 mg/dL 156 (H)   HDL Latest Ref Range: 40 - 59 mg/dL 36 (L)   LDL Direct Latest Ref Range: <130 mg/dL 119   WBC Latest Ref Range: 4 50 - 11 00 Thousand/uL 9 70   Red Blood Cell Count Latest Ref Range: 4 00 - 5 20 Million/uL 4 23   Hemoglobin Latest Ref Range: 12 0 - 16 0 g/dL 13 4   HCT Latest Ref Range: 36 0 - 46 0 % 39 7   MCV Latest Ref Range: 80 - 100 fL 94   MCH Latest Ref Range: 26 0 - 34 0 pg 31 8   MCHC Latest Ref Range: 31 0 - 36 0 g/dL 33 9   RDW Latest Ref Range: <15 3 % 12 6   Platelet Count Latest Ref Range: 150 - 450 Thousands/uL 283   MPV Latest Ref Range: 8 9 - 12 7 fL 9 3   Platelet Estimate Latest Ref Range: Adequate  Adequate   Neutrophils % Latest Ref Range: 45 - 65 % 63   Lymphocytes Relative Latest Ref Range: 20 - 50 % 28   Monocytes Relative Latest Ref Range: 1 - 10 % 6   Eosinophils Latest Ref Range: 0 - 6 % 2   Basophils Relative Latest Ref Range: 0 - 1 % 1   Absolute Neutrophils Latest Ref Range: 1 80 - 7 80 Thousands/µL 6 10   Lymphocytes Absolute Latest Ref Range: 0 50 - 4 00 Thousands/µL 2 80   Absolute Monocytes Latest Ref Range: 0 20 - 0 90 Thousand/µL 0 60   Absolute Eosinophils Latest Ref Range: 0 00 - 0 40 Thousand/µL 0 20   Basophils Absolute Latest Ref Range: 0 00 - 0 10 Thousands/µL 0 00   RBC Morphology Unknown Normal   TSH 3RD GENERATON Latest Ref Range: 0 465 - 4 680 uIU/mL 0 834       No past surgical history on file  --> ENT procedures: denies    Current Outpatient Prescriptions   Medication Sig Dispense Refill    amLODIPine (NORVASC) 5 mg tablet Take 1 tablet (5 mg total) by mouth daily 30 tablet 1    escitalopram (LEXAPRO) 10 mg tablet Take 1 tablet (10 mg total) by mouth daily 30 tablet 0    losartan (COZAAR) 100 MG tablet Take 1 tablet (100 mg total) by mouth daily 30 tablet 0    mometasone (NASONEX) 50 mcg/act nasal spray spray 2 spray by intranasal route  every day in each nostril      multivitamin (THERAGRAN) TABS Take 1 tablet by mouth       No current facility-administered medications for this visit  Social History:  -Employment: phlebotomist/MA  -Smoking: yes,  5-6 cigarettes a day  -Caffeine: 2-3 cups a day   10oz in the morning and 6-8 oz in the afternoon/evening   -Alcohol: wine, 4 glasses on Fri/Sat/Sun  -THC: denied  -OTC/Supplements/herbals: denied  -Illicits:  denies  -Family: lives at home with  and 3 children, her brother and husbands brother    ROS:  Genitourinary none   Cardiology none   Gastrointestinal none   Neurology Headaches upon awakening   Constitutional fatigue, excessive sweating at night and weight change   Integumentary none   Psychiatry anxiety, depression, aggressiveness or irritability and mood change   Musculoskeletal none   Pulmonary snoring and difficulty breathing when lying flat    ENT none   Endocrine none   Hematological none       MSE:  -Alert and appropriate: alert, calm, cooperative  -Oriented to person, place and time:  name, age, location, day/date/mon/yr  -Behavior: good, sustained eye contact  -Speech: Unremarkable rate/rhythm/volume  -Mood: "fine"  -Affect: full range  -Thought Processes: linear, logical, goal directed  PE:  Body mass index is 35 59 kg/m²  Vitals:    01/03/19 1400   BP: 144/76   Pulse: 76   Weight: 109 kg (241 lb)   Height: 5' 9" (1 753 m)       -General:  In NAD    -Eyes: Conjunctival injection: none     -EOM:  PERRLA, EOMI   -Eyelid hooding: none    -ENT: MP: 4/4   -Facial deformity: no retrognathia   -Hard palate: moderate arch   -Soft palate: enlarged right tonsil 2+ with elongated uvula and redundant tissue  crowding   -Gums and teeth: normal dentition   -Tongue: large tongue and  Scalloping   -Nares:  Patent on left, more congested on the right    -Neck/Lymphatics: Lymphadenopathy:  none appreciated   -Masses:  none appreciated   -Circumference: Neck Circumference: 39 5cm    -Cardiac: Auscultation:  RRR   - LE edema over shins: none appreciated    -Pulm: -Respirations: unlaboured         -Auscultation:  CTA bilaterally, posterior fields    -Neuro: No resting tremor     -Musculoskeletal: Gait and stance: normal turning and ambulation; unremarkable  Assessment:  Ms Agnes Tello is a 25 y o  female who is seen to evaluate for possible obstructive sleep apnea  Given the patient's symptoms of observed apneas, snoring, nocturnal gasping, excessive daytime tiredness/sleepiness, non-restorative sleep, morning headaches, bruxism, and frequent nocturnal awakenings in the context of a narrow airway, chronic sinus issues with a deviated septum, and obesity, a diagnosis of obstructive sleep apnea is very likely    The pathophysiology of, the reasons to treat and treatment options for obstructive sleep apnea were all reviewed with the patient today  Discussed the testing options and reviewed the benefits and downsides of both, patient opted for home sleep apnea testing but her insurance requires an in lab sleep test  She is amenable to treatment with PAP therapy  Discussed keeping nasal passages clear, abstaining from alcohol, and other sedating drugs at night- which will worsen symptoms of EAMON  Discussed weight loss  --History provided by: patient   --Records reviewed: in chart    Encounter Diagnoses   Name Primary?  Class 2 severe obesity due to excess calories with serious comorbidity and body mass index (BMI) of 35 0 to 35 9 in adult (HCC)     Snoring Yes    Witnessed episode of apnea     Gasping for breath     Headache upon awakening        Recommendations:  1)  in lab PSG if non-diagnostic  2)  Driving safety was reviewed with patient  If the patient feels too sleepy to drive she knows not to drive  If she becomes sleepy while driving she will pull over and nap  3) Follow-up in clinic 6-8 weeks after initiating treatment if indicted   4) Call with any questions or concerns  5) refrain from alcohol within 2 hours of sleep as alcohol will worsen symptoms of EAMON    All questions answered for the patient, who indicated understanding and agreed with the plan       Beverley Torres MD  Psychiatry/ Sleep medicine

## 2019-01-31 PROBLEM — E66.01 SEVERE OBESITY (BMI 35.0-39.9) WITH COMORBIDITY (HCC): Status: ACTIVE | Noted: 2018-10-04

## 2019-02-27 ENCOUNTER — OFFICE VISIT (OUTPATIENT)
Dept: BARIATRICS | Facility: CLINIC | Age: 29
End: 2019-02-27
Payer: COMMERCIAL

## 2019-02-27 VITALS
TEMPERATURE: 98.3 F | HEIGHT: 69 IN | HEART RATE: 69 BPM | SYSTOLIC BLOOD PRESSURE: 150 MMHG | DIASTOLIC BLOOD PRESSURE: 94 MMHG | BODY MASS INDEX: 35.87 KG/M2 | WEIGHT: 242.2 LBS

## 2019-02-27 DIAGNOSIS — Z86.59 HISTORY OF POSTPARTUM DEPRESSION: ICD-10-CM

## 2019-02-27 DIAGNOSIS — Z72.0 TOBACCO ABUSE: ICD-10-CM

## 2019-02-27 DIAGNOSIS — E66.01 SEVERE OBESITY (BMI 35.0-39.9) WITH COMORBIDITY (HCC): Primary | ICD-10-CM

## 2019-02-27 DIAGNOSIS — I10 CHRONIC HYPERTENSION: ICD-10-CM

## 2019-02-27 DIAGNOSIS — Z87.59 HISTORY OF POSTPARTUM DEPRESSION: ICD-10-CM

## 2019-02-27 DIAGNOSIS — R73.01 ELEVATED FASTING GLUCOSE: ICD-10-CM

## 2019-02-27 DIAGNOSIS — G47.30 SLEEP APNEA, UNSPECIFIED TYPE: ICD-10-CM

## 2019-02-27 DIAGNOSIS — N20.0 KIDNEY STONE: ICD-10-CM

## 2019-02-27 PROCEDURE — 99244 OFF/OP CNSLTJ NEW/EST MOD 40: CPT | Performed by: PHYSICIAN ASSISTANT

## 2019-02-27 RX ORDER — LABETALOL 100 MG/1
1 TABLET, FILM COATED ORAL EVERY 12 HOURS
COMMUNITY
End: 2019-02-27 | Stop reason: ALTCHOICE

## 2019-02-27 RX ORDER — AMOXICILLIN 500 MG/1
1 TABLET, FILM COATED ORAL EVERY 12 HOURS
COMMUNITY
Start: 2018-01-26 | End: 2019-02-27 | Stop reason: ALTCHOICE

## 2019-02-27 NOTE — ASSESSMENT & PLAN NOTE
Likely not a Topamax candidate  If a long time ago and passed easliy could consider with understanding of increased risk of stones  If recent or needing stenting not an option

## 2019-02-27 NOTE — PROGRESS NOTES
Assessment/Plan:    Severe obesity (BMI 35 0-39  9) with comorbidity ([address] )  -Discussed options of [facility]-Intensive Lifestyle Intervention Program, Very Low Calorie Diet-VLCD, Conservative Program, Dale-En-Y Gastric Bypass and Vertical Sleeve Gastrectomy and the role of weight loss medications   -Initial weight loss goal of 5-10% weight loss for improved health  -Screening labs  Recommend checking lab coverage before having labs drawn   -cmp, lipid, tsh reviewed from 10/8/18 all within acceptable limits except elevated triglycerides  Needs fasting insulin and a1c   -Patient is interested in pursuing Vertical Sleeve Gastrectomy      Kidney stone  Likely not a Topamax candidate  If a long time ago and passed easliy could consider with understanding of increased risk of stones  If recent or needing stenting not an option  Chronic hypertension  Taking norvasc and cozaar  -should improve with weight loss, dietary, and lifestyle changes  -continue management with prescribing provider  -patient to monitor bp at home and if consistently above 140/90 needs to follow up with pcp  -discussed if patient develops shortness of breath, chest pain, lightheadedness, dizziness, blurred vision, severe headache or extremity weakness needs to report to the ER immediately       History of postpartum depression  Taking lexapro  -continue management with prescribing provider    Tobacco abuse  Could consider wellbutrin for smoking cessation and weight loss     Sleep apnea  Has upcoming appt for sleep study         Info seminar info given     Diagnoses and all orders for this visit:    Severe obesity (BMI 35 0-39  9) with comorbidity (HCC)  -     Insulin, fasting; Future  -     HEMOGLOBIN A1C W/ EAG ESTIMATION; Future    Kidney stone  -     Insulin, fasting; Future  -     HEMOGLOBIN A1C W/ EAG ESTIMATION; Future    Chronic hypertension  -     Insulin, fasting; Future  -     HEMOGLOBIN A1C W/ EAG ESTIMATION;  Future    History of postpartum depression  -     Insulin, fasting; Future  -     HEMOGLOBIN A1C W/ EAG ESTIMATION; Future    Tobacco abuse  -     Insulin, fasting; Future  -     HEMOGLOBIN A1C W/ EAG ESTIMATION; Future    Sleep apnea, unspecified type  -     Insulin, fasting; Future  -     HEMOGLOBIN A1C W/ EAG ESTIMATION; Future    Elevated fasting glucose  -     Insulin, fasting; Future  -     HEMOGLOBIN A1C W/ EAG ESTIMATION; Future    Other orders  -     Discontinue: aspirin 81 MG tablet; 1 tablet Every 12 hours  -     Discontinue: amoxicillin (AMOXIL) 500 MG tablet; 1 tablet every 12 (twelve) hours  -     Discontinue: labetalol (NORMODYNE) 100 mg tablet; 1 tablet Every 12 hours          Subjective:   Chief Complaint   Patient presents with    Consult     Pt here for initial MWM consult w/PA  BMI 35 8  PT has EAMON and is about to get a CPAP  Patient ID: Doyle Kelsey  is a 25 y o  female with excess weight/obesity here to pursue weight management  Past Medical History:   Diagnosis Date    Heartburn     Hypertension     Kidney stone     Obesity     Sleep apnea        HPI:  Obesity/Excess Weight:  Severity: Severe  Onset: for the last year     Modifiers: Diet and Exercise  Contributing factors: Insufficient Physical Activity and Pregnancy  Associated symptoms: fatigue, decreased exercise capacity, decreased self esteem, increased shortness of breath and decreased mobility    Goals: 175-180 lbs   Hydration: 4 liters of water per day( some seltzer), 2 cups of coffee with hazelnut creamer  1-2 cups of soda   Alcohol: 1/2 bottle per wine     The following portions of the patient's history were reviewed and updated as appropriate: allergies, current medications, past family history, past medical history, past social history, past surgical history and problem list     Review of Systems   HENT: Negative for sore throat  Respiratory: Negative for cough and shortness of breath      Cardiovascular: Negative for chest pain and palpitations  Gastrointestinal: Negative for abdominal pain, constipation, diarrhea, nausea and vomiting         + GERD- taking nexium    Endocrine: Positive for heat intolerance  Negative for cold intolerance  Genitourinary: Negative for dysuria  Musculoskeletal: Positive for arthralgias (knees)  Negative for back pain  Skin: Negative for rash  Neurological: Positive for headaches (frequently thinks it maybe due to BP )  Psychiatric/Behavioral: Negative for suicidal ideas (denies HI)  + depression and anxiety- uncontrolled around menses   Encouraged to talk gyn        Objective:    /94 (BP Location: Right arm, Patient Position: Sitting, Cuff Size: Large)   Pulse 69   Temp 98 3 °F (36 8 °C) (Tympanic)   Ht 5' 9" (1 753 m)   Wt 110 kg (242 lb 3 2 oz)   BMI 35 77 kg/m²     Physical Exam   Nursing note and vitals reviewed  Constitutional   General appearance: Abnormal   well developed and morbidly obese  Eyes No conjunctival pallor  Ears, Nose, Mouth, and Throat Oral mucosa moist    Pulmonary   Respiratory effort: No increased work of breathing or signs of respiratory distress  Auscultation of lungs: Clear to auscultation, equal breath sounds bilaterally, no wheezes, no rales, no rhonci  Cardiovascular   Auscultation of heart: Normal rate and rhythm, normal S1 and S2, without murmurs  Examination of extremities for edema and/or varicosities: Normal   no edema  Abdomen   Abdomen: Abnormal   The abdomen was obese  Bowel sounds were normal  The abdomen was soft and nontender     Musculoskeletal   Gait and station: Normal     Psychiatric   Orientation to person, place and time: Normal     Affect: appropriate

## 2019-02-27 NOTE — ASSESSMENT & PLAN NOTE
-Discussed options of [facility]-Intensive Lifestyle Intervention Program, Very Low Calorie Diet-VLCD, Conservative Program, Dale-En-Y Gastric Bypass and Vertical Sleeve Gastrectomy and the role of weight loss medications   -Initial weight loss goal of 5-10% weight loss for improved health  -Screening labs  Recommend checking lab coverage before having labs drawn   -cmp, lipid, tsh reviewed from 10/8/18 all within acceptable limits except elevated triglycerides   Needs fasting insulin and a1c   -Patient is interested in pursuing Vertical Sleeve Gastrectomy

## 2019-02-27 NOTE — ASSESSMENT & PLAN NOTE
Taking norvasc and cozaar  -should improve with weight loss, dietary, and lifestyle changes  -continue management with prescribing provider  -patient to monitor bp at home and if consistently above 140/90 needs to follow up with pcp  -discussed if patient develops shortness of breath, chest pain, lightheadedness, dizziness, blurred vision, severe headache or extremity weakness needs to report to the ER immediately

## 2019-03-01 ENCOUNTER — TELEPHONE (OUTPATIENT)
Dept: BARIATRICS | Facility: CLINIC | Age: 29
End: 2019-03-01

## 2019-03-01 NOTE — TELEPHONE ENCOUNTER
Rec'd online info ervin paperwork on our fax  SWP and scheduled eval in [facility]  I was upfront with patient that she has a 6 month weight check requirements for her insurance and at this point, she is very close to not qualifying because her BMI is 35 5  Patient was argumentative that her BMI was still above 35 so she qualifies    Paperwork emailed to Hugo Brown

## 2019-03-05 ENCOUNTER — TELEPHONE (OUTPATIENT)
Dept: BARIATRICS | Facility: CLINIC | Age: 29
End: 2019-03-05

## 2019-03-12 ENCOUNTER — CLINICAL SUPPORT (OUTPATIENT)
Dept: BARIATRICS | Facility: CLINIC | Age: 29
End: 2019-03-12

## 2019-03-12 ENCOUNTER — HOSPITAL ENCOUNTER (OUTPATIENT)
Dept: SLEEP CENTER | Facility: CLINIC | Age: 29
Discharge: HOME | End: 2019-03-12
Payer: COMMERCIAL

## 2019-03-12 VITALS
SYSTOLIC BLOOD PRESSURE: 158 MMHG | TEMPERATURE: 97.7 F | HEIGHT: 69 IN | BODY MASS INDEX: 36.11 KG/M2 | DIASTOLIC BLOOD PRESSURE: 92 MMHG | RESPIRATION RATE: 18 BRPM | HEART RATE: 70 BPM | WEIGHT: 243.8 LBS

## 2019-03-12 DIAGNOSIS — E66.01 CLASS 2 SEVERE OBESITY DUE TO EXCESS CALORIES WITH SERIOUS COMORBIDITY AND BODY MASS INDEX (BMI) OF 35.0 TO 35.9 IN ADULT (HCC): ICD-10-CM

## 2019-03-12 DIAGNOSIS — E66.01 SEVERE OBESITY (BMI 35.0-39.9) WITH COMORBIDITY (HCC): ICD-10-CM

## 2019-03-12 DIAGNOSIS — E66.01 MORBID OBESITY (HCC): Primary | ICD-10-CM

## 2019-03-12 DIAGNOSIS — R06.89 GASPING FOR BREATH: ICD-10-CM

## 2019-03-12 DIAGNOSIS — G47.30 SLEEP APNEA, UNSPECIFIED TYPE: Primary | ICD-10-CM

## 2019-03-12 DIAGNOSIS — R51.9 HEADACHE UPON AWAKENING: ICD-10-CM

## 2019-03-12 DIAGNOSIS — R06.83 SNORING: ICD-10-CM

## 2019-03-12 DIAGNOSIS — I10 CHRONIC HYPERTENSION: ICD-10-CM

## 2019-03-12 DIAGNOSIS — R06.81 WITNESSED EPISODE OF APNEA: ICD-10-CM

## 2019-03-12 PROCEDURE — 95810 POLYSOM 6/> YRS 4/> PARAM: CPT

## 2019-03-12 PROCEDURE — 95810 POLYSOM 6/> YRS 4/> PARAM: CPT | Performed by: PSYCHIATRY & NEUROLOGY

## 2019-03-12 PROCEDURE — RECHECK

## 2019-03-12 NOTE — PROGRESS NOTES
Bariatric Nutrition Assessment Note    Type of surgery    Preop  Surgery Date: patient has 6 month program requirement    Surgeon: Dr oPrfirio Thomas  25 y o   female     Wt with BMI of 25: 168 8 lb   Pre-Op Excess Wt:74 2 lb  Ht 5' 9" (1 753 m)   Wt 111 kg (243 lb 12 8 oz)   BMI 36 00 kg/m²     [facility]  Tonyor Equation:  BMR: 1890 kcal per day  Estimated calorie for mild weight loss  = 2018 kcal per day ( 1/2 # per week wt loss - sedentary )  Estimated protein needs = 75-90 grams per day ( 1 0-1 2 grams /kg IBW)    Weight History   Onset of Obesity: Adult gained weight with each pregnancy - oldest child is 15   Family history of obesity: Yes  Wt Loss Attempts: Commercial Programs ([employer], [employer], etc )  Exercise  Meal Replacements (Medifast, Slim Fast, etc )  Nutrition Counseling with RD  Self Created Diets (Portion Control, Healthy Food Choices, etc )  Maximum Wt Lost: 12 pounds, shakes and portion control     Review of History and Medications   Past Medical History:   Diagnosis Date    Heartburn     Hypertension     Kidney stone     Obesity     Sleep apnea      No past surgical history on file  Social History     Socioeconomic History    Marital status: Significant Other     Spouse name: Not on file    Number of children: Not on file    Years of education: Not on file    Highest education level: Not on file   Occupational History    Not on file   Social Needs    Financial resource strain: Not on file    Food insecurity:     Worry: Not on file     Inability: Not on file    Transportation needs:     Medical: Not on file     Non-medical: Not on file   Tobacco Use    Smoking status: Light Tobacco Smoker     Types: Cigarettes    Smokeless tobacco: Current User    Tobacco comment: Maximum 3-5 cigarettes per day     Substance and Sexual Activity    Alcohol use: Yes     Comment: occasional    Drug use: No    Sexual activity: Not on file Lifestyle    Physical activity:     Days per week: Not on file     Minutes per session: Not on file    Stress: Not on file   Relationships    Social connections:     Talks on phone: Not on file     Gets together: Not on file     Attends [employer] service: Not on file     Active member of club or organization: Not on file     Attends meetings of clubs or organizations: Not on file     Relationship status: Not on file    Intimate partner violence:     Fear of current or ex partner: Not on file     Emotionally abused: Not on file     Physically abused: Not on file     Forced sexual activity: Not on file   Other Topics Concern    Not on file   Social History Narrative    Not on file       Current Outpatient Medications:     amLODIPine (NORVASC) 5 mg tablet, Take 1 tablet (5 mg total) by mouth daily, Disp: 30 tablet, Rfl: 1    escitalopram (LEXAPRO) 10 mg tablet, Take 1 tablet (10 mg total) by mouth daily, Disp: 30 tablet, Rfl: 0    losartan (COZAAR) 100 MG tablet, Take 1 tablet (100 mg total) by mouth daily, Disp: 30 tablet, Rfl: 0    mometasone (NASONEX) 50 mcg/act nasal spray, spray 2 spray by intranasal route  every day in each nostril, Disp: , Rfl:     multivitamin (THERAGRAN) TABS, Take 1 tablet by mouth, Disp: , Rfl:      Food Intake and Lifestyle Assessment   Food Intake Assessment completed via 24 hour recall  5am wakes up   Breakfast: 7am:  Scrambled eggs, 2 sausages and homefries  - coffee   Snack: bag of nuts    Lunch:  11:30 am leftovers - spaghetti or chicken , rice and beans   Snack: cheese stick or fruit   Dinner: 6:30 pm or 7 Pm - lean protein , vegetables and starch   Snack: 0   Beverage intake: water, coffee/tea and selzter water, 1 cup soda ( regular)  Protein supplement: none currently   Estimated protein intake per day: 60-70 grams   Estimated fluid intake per day: 80 ounces or more per day   Meals eaten away from home: 5 days per week - eats in cafeteria and once per week for dinner  Typical meal pattern: 3 meals per day and 1-2 snacks per day  Eating Behaviors: Large portion sizes, Frequent snacking/ grazing, Mindless eating and Emotional eating  Food allergies or intolerances: No Known Allergies  Cultural or Buddhist considerations: no     Physical Assessment  Physical Activity  Types of exercise: Walking- walks the dogs   Walks at work - [employer]   Tries to maximize walking-  [employer] far away from the stores  Cleans house, up and down stairs  Current physical limitations: none     Psychosocial Assessment   Support systems: spouse parent(s) sibling(s) friend(s)  Socioeconomic factors: works part time, has 3 children, lives with spouse  Nutrition Diagnosis  Diagnosis: Overweight / Obesity (NC-3 3)  Related to: Physical inactivity and Excessive energy intake  As Evidenced by: BMI >25 and Excessive energy intake     Nutrition Prescription: Recommend the following diet  Regular    Interventions and Teaching   Discussed pre-op and post-op nutrition guidelines  Patient educated and handouts provided    Surgical changes to stomach / GI  Capacity of post-surgery stomach  Diet progression  Adequate hydration  Expected weight loss  Weight loss plateaus/ possibility of weight regain  Exercise  Suggestions for pre-op diet  Nutrition considerations after surgery  Protein supplements  Meal planning and preparation  Appropriate carbohydrate, protein, and fat intake, and food/fluid choices to maximize safe weight loss, nutrient intake, and tolerance   Dietary and lifestyle changes  Possible problems with poor eating habits  Intuitive eating  Techniques for self monitoring and keeping daily food journal Reviewed [employer] [facility]   Potential for food intolerance after surgery, and ways to deal with them including: lactose intolerance, nausea, reflux, vomiting, diarrhea, food intolerance, appetite changes, gas  Vitamin / Mineral supplementation of Multivitamin with minerals, Calcium, Vitamin B12, Iron and Vitamin D  Patient currently takes an adult multivitamin and mineral    Has vitamin D at home but has not been taking it  Recently had a baby 10 months ago but has stopped nursing  Education provided to: patient    Barriers to learning: No barriers identified  Readiness to change: preparation    Prior research on procedure: internet, pre-op class and friends or family    Comprehension: verbalizes understanding     Expected Compliance: good  Recommendations  Pt is an appropriate candidate for surgery   Yes    Evaluation / Monitoring  Dietitian to Monitor: Eating pattern as discussed Body weight Lab values Physical activity    Goals  Food journal, Exercise 30 minutes 5 times per week, Complete lession plans 1-6 and take 2000 IU of vitamin D3 per day in addition to her daily multivitamin and mineral      Time Spent:   45 Minutes

## 2019-03-12 NOTE — PROGRESS NOTES
Bariatric Behavioral Health Evaluation    Presenting Problem patient here to improve health, increase mobility, reduce chronic pain and prevent family disease  Is the patient seeking Bariatric Surgery Eval? Yes  If yes how long have you researched this surgery option  Realizes Post- Op Requirements? Yes     Pre-morbid level of function and history of present illness: patient has medical issues  Psychiatric/Psychological Treatment Diagnosis: patient has Axis I diagnosis of post partum depression  She is prescribed medication by her PCP  [facility] (include relationship with each and Psych/Med HX)    Mother  obesity and Father  history of addiction    Domestic Violence No    Additional comments/stressors related to family/relationships/peer support     Physical/Psychological Assessment:     Appearance: appropriate  Sociability: average  Affect: appropriate  Mood: calm  Thought Process: coherent  Speech: normal  Content: no impairment  Orientation: person  Yes , place  Yes , time  Yes , normal attention span  Yes , normal memory  Yes   and normal judgement  Yes   Insight: emotional  good    Risk Assessment:     none    Recommendations: Recommended for surgery  yes    Risk of Harm to Self or Others: none reported  Observation:     Interviews this interview only  Access to weapons no     Based on the previous information, the client presents the following risk of harm to self or others: low     Note Patient reports Axis I diagnosis of post partum depression  She is prescribed medication by her PCP  Patient educated regarding the impact of nicotine and alcohol on the post bariatric surgery patient  Patient has a positive family history of obesity and alcohol addiction  Patient meets criteria for surgery at this program and is referred to physician       BARIATRIC SURGERY EDUCATION CHECKLIST    I have received education related to my bariatric surgery process and understand:    Patients may be required to complete a psychiatric evaluation and receive clearance for surgery from their psychiatrist     Patients who undergo weight loss surgery are at higher risk of increased mental health concerns and suicide attempts  Patients may be required to complete a full substance abuse evaluation and then complete all treatment recommendations prior to surgery  If diagnosis of abuse/dependence results, patient may be required to remain sober for one (1) year before having bariatric surgery  Patients on psychiatric medications should check with their provider to discuss psychiatric medications and the changes in absorption  Patient should discuss all time release medications with provider and take all medications as prescribed  The recommendation is that there is no use of  any tobacco products, Hookah or  vapes for the bariatric post-operation patient  Bariatric surgery patients should not consume alcohol as a post-operative patient as it may increase risk of numerous health conditions including but not limited to alcohol abuse and ulcers  There is a possibility of weight regain if patient does not follow all program guidelines and recommendations  Bariatric surgery patients should exercise thirty (30) to sixty (60) minutes per day to maintain post-surgical weight loss  Research indicates that bariatric patients are more successful when they see a therapist for up to two (2) years post-op  Patients will follow all medical and dietary recommendations provided  Patient will keep all scheduled appointments and follow up with their physician for a minimum of five (5) years  Patient will take all vitamins as recommended  Post-operative vitamins are life-long  Patient reviewed Bariatric Surgery Education Checklist and agrees they have received education on these issues

## 2019-03-13 ENCOUNTER — TRANSCRIBE ORDERS (OUTPATIENT)
Dept: ADMINISTRATIVE | Facility: HOSPITAL | Age: 29
End: 2019-03-13

## 2019-03-13 ENCOUNTER — APPOINTMENT (OUTPATIENT)
Dept: LAB | Facility: HOSPITAL | Age: 29
End: 2019-03-13
Payer: COMMERCIAL

## 2019-03-13 DIAGNOSIS — E66.01 SEVERE OBESITY (BMI 35.0-39.9) WITH COMORBIDITY (HCC): ICD-10-CM

## 2019-03-13 DIAGNOSIS — N20.0 KIDNEY STONE: ICD-10-CM

## 2019-03-13 DIAGNOSIS — G47.30 SLEEP APNEA, UNSPECIFIED TYPE: ICD-10-CM

## 2019-03-13 DIAGNOSIS — Z87.59 HISTORY OF POSTPARTUM DEPRESSION: ICD-10-CM

## 2019-03-13 DIAGNOSIS — R73.01 ELEVATED FASTING GLUCOSE: ICD-10-CM

## 2019-03-13 DIAGNOSIS — Z72.0 TOBACCO ABUSE: ICD-10-CM

## 2019-03-13 DIAGNOSIS — Z86.59 HISTORY OF POSTPARTUM DEPRESSION: ICD-10-CM

## 2019-03-13 DIAGNOSIS — I10 CHRONIC HYPERTENSION: ICD-10-CM

## 2019-03-13 PROBLEM — G47.33 OSA (OBSTRUCTIVE SLEEP APNEA): Status: ACTIVE | Noted: 2019-02-27

## 2019-03-13 LAB
ANION GAP SERPL CALCULATED.3IONS-SCNC: 9 MMOL/L (ref 5–14)
BUN SERPL-MCNC: 15 MG/DL (ref 5–25)
CALCIUM SERPL-MCNC: 9.1 MG/DL (ref 8.4–10.2)
CHLORIDE SERPL-SCNC: 104 MMOL/L (ref 97–108)
CO2 SERPL-SCNC: 24 MMOL/L (ref 22–30)
CREAT SERPL-MCNC: 0.6 MG/DL (ref 0.6–1.2)
EST. AVERAGE GLUCOSE BLD GHB EST-MCNC: 103 MG/DL
GFR SERPL CREATININE-BSD FRML MDRD: 123 ML/MIN/1.73SQ M
GLUCOSE P FAST SERPL-MCNC: 83 MG/DL (ref 70–99)
HBA1C MFR BLD: 5.2 % (ref 4.2–6.3)
INSULIN SERPL-ACNC: 16.6 MU/L (ref 3–25)
POTASSIUM SERPL-SCNC: 4 MMOL/L (ref 3.6–5)
SODIUM SERPL-SCNC: 137 MMOL/L (ref 137–147)

## 2019-03-13 PROCEDURE — 83036 HEMOGLOBIN GLYCOSYLATED A1C: CPT

## 2019-03-13 PROCEDURE — 83525 ASSAY OF INSULIN: CPT

## 2019-03-13 PROCEDURE — 80048 BASIC METABOLIC PNL TOTAL CA: CPT

## 2019-03-13 PROCEDURE — 36415 COLL VENOUS BLD VENIPUNCTURE: CPT

## 2019-03-13 NOTE — PROGRESS NOTES
Sleep Study Documentation    Pre-Sleep Study       Sleep testing procedure explained to patient:YES    Patient napped prior to study:NO    Caffeine:Dayshift worker after 12PM   Caffeine use:YES- coffee  6 to 18 ounces    Alcohol: Alcohol use:NO    Typical day for patient:YES       Study Documentation    Sleep Study Indications: EAMON,SNORING, PLMS AND ,EDS    Diagnostic   Snore: Moderate  Supplemental O2: no    Minimum SaO2 88%  Baseline SaO2 95%        EKG abnormalities: no     EEG abnormalities: no    Sleep Study Recorded < 2 hours: N/A    Sleep Study Recorded > 2 hours but incomplete study: N/A    Sleep Study Recorded 6 hours but no sleep obtained: NO    Patient classification: employed       Post-Sleep Study    Medication used at bedtime or during sleep study:YES over the counter sleep aid    Patient reports time it took to fall asleep:20 to 30 minutes    Patient reports waking up during study:1 to 2 times  Patient reports returning to sleep without difficulty  Patient reports sleeping 2 to 4 hours without dreaming  Patient reports sleep during study:better than usual    Patient rated sleepiness: Somewhat sleepy or tired    PAP treatment:no

## 2019-03-14 ENCOUNTER — TELEPHONE (OUTPATIENT)
Dept: BARIATRICS | Facility: CLINIC | Age: 29
End: 2019-03-14

## 2019-03-14 NOTE — TELEPHONE ENCOUNTER
----- Message from Guillermo Petty PA-C sent at 3/14/2019  8:56 AM EDT -----  Please call patient and inform her that her a1c and fasting insulin are within normal range      Result note: a1c and fasting insulin within acceptable range

## 2019-03-15 ENCOUNTER — TELEPHONE (OUTPATIENT)
Dept: SLEEP CENTER | Facility: CLINIC | Age: 29
End: 2019-03-15

## 2019-03-15 NOTE — TELEPHONE ENCOUNTER
Advised patient sleep study shows no significant disordered breathing  Advised patient that Dr Alexy Sosa recommends blood work to evaluate for RLS   Patient declined at this time also declined follow up appointment

## 2019-04-02 ENCOUNTER — OFFICE VISIT (OUTPATIENT)
Dept: FAMILY MEDICINE CLINIC | Facility: CLINIC | Age: 29
End: 2019-04-02
Payer: COMMERCIAL

## 2019-04-02 VITALS
HEIGHT: 69 IN | DIASTOLIC BLOOD PRESSURE: 90 MMHG | BODY MASS INDEX: 36.58 KG/M2 | SYSTOLIC BLOOD PRESSURE: 130 MMHG | RESPIRATION RATE: 16 BRPM | HEART RATE: 91 BPM | WEIGHT: 247 LBS | TEMPERATURE: 98.2 F | OXYGEN SATURATION: 99 %

## 2019-04-02 DIAGNOSIS — Z72.0 TOBACCO ABUSE: ICD-10-CM

## 2019-04-02 DIAGNOSIS — I10 CHRONIC HYPERTENSION: ICD-10-CM

## 2019-04-02 DIAGNOSIS — G25.81 RLS (RESTLESS LEGS SYNDROME): Primary | ICD-10-CM

## 2019-04-02 DIAGNOSIS — Z87.59 HISTORY OF POSTPARTUM DEPRESSION: ICD-10-CM

## 2019-04-02 DIAGNOSIS — Z86.59 HISTORY OF POSTPARTUM DEPRESSION: ICD-10-CM

## 2019-04-02 PROCEDURE — 99214 OFFICE O/P EST MOD 30 MIN: CPT | Performed by: FAMILY MEDICINE

## 2019-04-02 RX ORDER — ESCITALOPRAM OXALATE 10 MG/1
10 TABLET ORAL DAILY
Qty: 30 TABLET | Refills: 1 | Status: SHIPPED | OUTPATIENT
Start: 2019-04-02 | End: 2020-05-15 | Stop reason: DRUGHIGH

## 2019-04-02 RX ORDER — AMLODIPINE BESYLATE 5 MG/1
5 TABLET ORAL DAILY
Qty: 30 TABLET | Refills: 1 | Status: SHIPPED | OUTPATIENT
Start: 2019-04-02 | End: 2019-09-24 | Stop reason: SDUPTHER

## 2019-04-02 RX ORDER — LOSARTAN POTASSIUM 100 MG/1
100 TABLET ORAL DAILY
Qty: 30 TABLET | Refills: 1 | Status: SHIPPED | OUTPATIENT
Start: 2019-04-02 | End: 2019-09-24 | Stop reason: SDUPTHER

## 2019-04-03 PROBLEM — O14.13 PREECLAMPSIA, SEVERE, THIRD TRIMESTER: Status: RESOLVED | Noted: 2018-05-07 | Resolved: 2019-04-03

## 2019-04-10 ENCOUNTER — OFFICE VISIT (OUTPATIENT)
Dept: FAMILY MEDICINE CLINIC | Facility: CLINIC | Age: 29
End: 2019-04-10
Payer: COMMERCIAL

## 2019-04-10 VITALS
SYSTOLIC BLOOD PRESSURE: 130 MMHG | OXYGEN SATURATION: 97 % | TEMPERATURE: 97.7 F | HEIGHT: 69 IN | DIASTOLIC BLOOD PRESSURE: 80 MMHG | HEART RATE: 84 BPM | WEIGHT: 243 LBS | BODY MASS INDEX: 35.99 KG/M2

## 2019-04-10 DIAGNOSIS — J01.00 ACUTE NON-RECURRENT MAXILLARY SINUSITIS: Primary | ICD-10-CM

## 2019-04-10 DIAGNOSIS — J01.00 ACUTE NON-RECURRENT MAXILLARY SINUSITIS: ICD-10-CM

## 2019-04-10 PROCEDURE — 99213 OFFICE O/P EST LOW 20 MIN: CPT | Performed by: FAMILY MEDICINE

## 2019-04-10 PROCEDURE — 3008F BODY MASS INDEX DOCD: CPT | Performed by: FAMILY MEDICINE

## 2019-04-10 RX ORDER — FLUTICASONE PROPIONATE 50 MCG
2 SPRAY, SUSPENSION (ML) NASAL DAILY
Qty: 1 BOTTLE | Refills: 2 | Status: SHIPPED | OUTPATIENT
Start: 2019-04-10

## 2019-04-10 RX ORDER — AMOXICILLIN 500 MG/1
500 CAPSULE ORAL EVERY 8 HOURS SCHEDULED
Qty: 21 CAPSULE | Refills: 0 | Status: SHIPPED | OUTPATIENT
Start: 2019-04-10 | End: 2019-04-17

## 2019-04-10 RX ORDER — MOMETASONE FUROATE 50 UG/1
2 SPRAY, METERED NASAL DAILY
Qty: 28 ACT | Refills: 0 | Status: SHIPPED | OUTPATIENT
Start: 2019-04-10 | End: 2019-04-10 | Stop reason: ALTCHOICE

## 2019-04-11 ENCOUNTER — OFFICE VISIT (OUTPATIENT)
Dept: BARIATRICS | Facility: CLINIC | Age: 29
End: 2019-04-11

## 2019-04-11 VITALS — BODY MASS INDEX: 36.17 KG/M2 | HEIGHT: 69 IN | WEIGHT: 244.2 LBS

## 2019-04-11 DIAGNOSIS — E66.9 OBESITY, CLASS II, BMI 35-39.9: Primary | ICD-10-CM

## 2019-04-11 PROCEDURE — RECHECK: Performed by: DIETITIAN, REGISTERED

## 2019-05-17 ENCOUNTER — OFFICE VISIT (OUTPATIENT)
Dept: BARIATRICS | Facility: CLINIC | Age: 29
End: 2019-05-17

## 2019-05-17 VITALS — HEIGHT: 69 IN | WEIGHT: 246.8 LBS | BODY MASS INDEX: 36.56 KG/M2

## 2019-05-17 DIAGNOSIS — E66.9 OBESITY, CLASS II, BMI 35-39.9: Primary | ICD-10-CM

## 2019-05-17 PROCEDURE — RECHECK

## 2019-06-13 ENCOUNTER — OFFICE VISIT (OUTPATIENT)
Dept: BARIATRICS | Facility: CLINIC | Age: 29
End: 2019-06-13
Payer: COMMERCIAL

## 2019-06-13 VITALS
DIASTOLIC BLOOD PRESSURE: 82 MMHG | WEIGHT: 245 LBS | HEART RATE: 86 BPM | TEMPERATURE: 97 F | BODY MASS INDEX: 36.29 KG/M2 | HEIGHT: 69 IN | SYSTOLIC BLOOD PRESSURE: 124 MMHG

## 2019-06-13 DIAGNOSIS — E66.01 OBESITY, CLASS III, BMI 40-49.9 (MORBID OBESITY) (HCC): Primary | ICD-10-CM

## 2019-06-13 PROCEDURE — 99203 OFFICE O/P NEW LOW 30 MIN: CPT | Performed by: SURGERY

## 2019-07-06 ENCOUNTER — HOSPITAL ENCOUNTER (EMERGENCY)
Facility: HOSPITAL | Age: 29
Discharge: HOME | End: 2019-07-06
Attending: EMERGENCY MEDICINE | Admitting: EMERGENCY MEDICINE
Payer: COMMERCIAL

## 2019-07-06 ENCOUNTER — APPOINTMENT (EMERGENCY)
Dept: RADIOLOGY | Facility: HOSPITAL | Age: 29
End: 2019-07-06
Payer: COMMERCIAL

## 2019-07-06 VITALS
SYSTOLIC BLOOD PRESSURE: 186 MMHG | OXYGEN SATURATION: 98 % | TEMPERATURE: 97.8 F | RESPIRATION RATE: 16 BRPM | HEART RATE: 90 BPM | DIASTOLIC BLOOD PRESSURE: 113 MMHG

## 2019-07-06 DIAGNOSIS — M79.5 FOREIGN BODY (FB) IN SOFT TISSUE: Primary | ICD-10-CM

## 2019-07-06 PROCEDURE — 90471 IMMUNIZATION ADMIN: CPT

## 2019-07-06 PROCEDURE — 90715 TDAP VACCINE 7 YRS/> IM: CPT | Performed by: EMERGENCY MEDICINE

## 2019-07-06 PROCEDURE — 73560 X-RAY EXAM OF KNEE 1 OR 2: CPT

## 2019-07-06 PROCEDURE — 99283 EMERGENCY DEPT VISIT LOW MDM: CPT | Performed by: EMERGENCY MEDICINE

## 2019-07-06 PROCEDURE — 99283 EMERGENCY DEPT VISIT LOW MDM: CPT

## 2019-07-06 PROCEDURE — 12001 RPR S/N/AX/GEN/TRNK 2.5CM/<: CPT | Performed by: EMERGENCY MEDICINE

## 2019-07-06 RX ORDER — BACITRACIN, NEOMYCIN, POLYMYXIN B 400; 3.5; 5 [USP'U]/G; MG/G; [USP'U]/G
1 OINTMENT TOPICAL ONCE
Status: COMPLETED | OUTPATIENT
Start: 2019-07-06 | End: 2019-07-06

## 2019-07-06 RX ORDER — CEPHALEXIN 250 MG/1
500 CAPSULE ORAL 4 TIMES DAILY
Qty: 40 CAPSULE | Refills: 0 | Status: SHIPPED | OUTPATIENT
Start: 2019-07-06 | End: 2019-07-11

## 2019-07-06 RX ORDER — LIDOCAINE HYDROCHLORIDE AND EPINEPHRINE 10; 10 MG/ML; UG/ML
5 INJECTION, SOLUTION INFILTRATION; PERINEURAL ONCE
Status: COMPLETED | OUTPATIENT
Start: 2019-07-06 | End: 2019-07-06

## 2019-07-06 RX ADMIN — TETANUS TOXOID, REDUCED DIPHTHERIA TOXOID AND ACELLULAR PERTUSSIS VACCINE, ADSORBED 0.5 ML: 5; 2.5; 8; 8; 2.5 SUSPENSION INTRAMUSCULAR at 22:05

## 2019-07-06 RX ADMIN — BACITRACIN, NEOMYCIN, POLYMYXIN B 1 SMALL APPLICATION: 400; 3.5; 5 OINTMENT TOPICAL at 22:42

## 2019-07-06 RX ADMIN — LIDOCAINE HYDROCHLORIDE,EPINEPHRINE BITARTRATE 5 ML: 10; .01 INJECTION, SOLUTION INFILTRATION; PERINEURAL at 22:06

## 2019-07-07 NOTE — DISCHARGE INSTRUCTIONS
Please go to urgent care or ED for suture removal in 7-10 days  Return if fevers, skin becomes infected

## 2019-07-07 NOTE — ED PROCEDURE NOTE
Procedure  Laceration repair  Date/Time: 7/6/2019 10:50 PM  Performed by: Essence Mcgee DO  Authorized by: Essence Mcgee DO   Consent: Verbal consent obtained  Risks and benefits: risks, benefits and alternatives were discussed  Consent given by: patient  Patient understanding: patient states understanding of the procedure being performed  Radiology Images displayed and confirmed  If images not available, report reviewed: imaging studies available  Patient identity confirmed: verbally with patient  Location: Right lower extremity  Laceration length: 2 cm  Foreign bodies: glass  Tendon involvement: none  Nerve involvement: none  Vascular damage: no  Anesthesia: local infiltration    Anesthesia:  Local Anesthetic: lidocaine 1% with epinephrine  Anesthetic total: 2 mL    Sedation:  Patient sedated: no      Wound Dehiscence:  Superficial Wound Dehiscence: simple closure      Procedure Details:  Preparation: Patient was prepped and draped in the usual sterile fashion    Irrigation solution: saline  Amount of cleaning: standard  Skin closure: 4-0 Prolene  Number of sutures: 2  Technique: simple  Approximation: close  Approximation difficulty: simple  Dressing: antibiotic ointment and 4x4 sterile gauze                       Essence Mcgee DO  07/06/19 [id number]

## 2019-07-07 NOTE — ED ATTENDING ATTESTATION
Estevan Mercedes DO, saw and evaluated the patient  I have discussed the patient with the resident/non-physician practitioner and agree with the resident's/non-physician practitioner's findings, Plan of Care, and MDM as documented in the resident's/non-physician practitioner's note, except where noted  All available labs and Radiology studies were reviewed  At this point I agree with the current assessment done in the Emergency Department  I have conducted an independent evaluation of this patient including a focused history and a physical exam     ED Note - Kim Vallecillo 30 y o  female MRN: [id number]  Unit/Bed#: ED 25 Encounter: [id number]    History of Present Illness   HPI  Kim Vallecillo is a 30 y o  female who presents for evaluation of foreign body in right knee that occurred approx  2 days ago  Likely broken glass as Pt  Was in a swimming pool with a wine glass that broke  Pt  Has tried to remove the glass with no success  Note laceration with bleeding to pretibial region  No other injuries  REVIEW OF SYSTEMS  See HPI for further details  12 systems reviewed and otherwise negative except as noted     Historical Information     PAST MEDICAL HISTORY  Past Medical History:   Diagnosis Date    Arthritis     Depression     Heartburn     Hypertension     Hypertension     Kidney stone     Obesity     Sleep apnea        FAMILY HISTORY  Family History   Problem Relation Age of Onset    Hypertension Family     Hypertension Mother     Alcohol abuse Father     Diabetes Father     No Known Problems Brother     Heart disease Neg Hx     Thyroid disease Neg Hx     Stroke Neg Hx        SOCIAL HISTORY  Social History     Socioeconomic History    Marital status: Significant Other     Spouse name: None    Number of children: None    Years of education: None    Highest education level: None   Occupational History    None   Social Needs    Financial resource strain: None    Food insecurity: Worry: None     Inability: None    Transportation needs:     Medical: None     Non-medical: None   Tobacco Use    Smoking status: Light Tobacco Smoker     Types: Cigarettes    Smokeless tobacco: Current User   Substance and Sexual Activity    Alcohol use: Yes     Frequency: 2-4 times a month     Drinks per session: 3 or 4     Binge frequency: Never     Comment: social    Drug use: No    Sexual activity: Yes     Partners: Male   Lifestyle    Physical activity:     Days per week: None     Minutes per session: None    Stress: None   Relationships    Social connections:     Talks on phone: None     Gets together: None     Attends Buddhist service: None     Active member of club or organization: None     Attends meetings of clubs or organizations: None     Relationship status: None    Intimate partner violence:     Fear of current or ex partner: None     Emotionally abused: None     Physically abused: None     Forced sexual activity: None   Other Topics Concern    None   Social History Narrative    None       SURGICAL HISTORY  History reviewed  No pertinent surgical history  Meds/Allergies     CURRENT MEDICATIONS  No current facility-administered medications for this encounter       Current Outpatient Medications:     amLODIPine (NORVASC) 5 mg tablet, Take 1 tablet (5 mg total) by mouth daily, Disp: 30 tablet, Rfl: 1    fluticasone (FLONASE) 50 mcg/act nasal spray, 2 sprays into each nostril daily, Disp: 1 Bottle, Rfl: 2    losartan (COZAAR) 100 MG tablet, Take 1 tablet (100 mg total) by mouth daily, Disp: 30 tablet, Rfl: 1    multivitamin (THERAGRAN) TABS, Take 1 tablet by mouth, Disp: , Rfl:     escitalopram (LEXAPRO) 10 mg tablet, Take 1 tablet (10 mg total) by mouth daily, Disp: 30 tablet, Rfl: 1    (Not in a hospital admission)    ALLERGIES  No Known Allergies  Objective     PHYSICAL EXAM    VITAL SIGNS: Blood pressure (!) 186/113, pulse 90, temperature 97 8 °F (36 6 °C), temperature source Oral, resp  rate 16, SpO2 98 %, not currently breastfeeding  Constitutional:  Appears well developed and well nourished, no acute distress, non-toxic appearance   Eyes:  PERRL, EOMI, conjunctivae pink, sclerae non-icteric    HENT:  Normocephalic/Atraumatic, no rhinorrhea, mucous membranes moist   Neck: normal range of motion, no tenderness, supple   Respiratory:  No respiratory distress, normal breath sounds  Cardiovascular:  Normal rate, normal rhythm  GI:  Soft, non-tender, non-distended  :  No CVAT, no flank ecchymosis   Musculoskeletal:  RLE: laceration approx  1 cm in length with palpable foreign body  Light bleeding  No swelling or edema, +ve tenderness, no deformities  No erythema or purulence  Integument:  Pink, warm, dry, Well hydrated, no rash, no erythema, no bullae   Lymphatic:  No cervical/ tonsillar/ submandibular lymphadenopathy noted   Neurologic:  Awake, Alert & oriented x 3, CN 2-12 intact, no focal neurological deficits  Psychiatric:  Speech and behavior appropriate             ED COURSE and MDM:    Assessment/Plan   Assessment:  Keyanna Aparicio is a 30 y o  female presents for evaluation of foreign body right knee/ pretibial region  Plan:    X-ray, symptom management, foreign body removal, antibiotics as needed, disposition as appropriate  CRITICAL CARE TIME: 0 minutes      Portions of the record may have been created with voice recognition software  Occasional wrong word or "sound a like" substitutions may have occurred due to the inherent limitations of voice recognition software       ED Provider  Electronically Signed by

## 2019-07-07 NOTE — ED PROVIDER NOTES
History  Chief Complaint   Patient presents with    Knee Injury     pt reports getting glass from wine bottle stuck in right knee on      Ariela Correa is a 30y o  year old Female presenting to the [facility] for right foreign body  Patient states she was in the pool with a glass bottle and mildly intoxicated when she noticed a piece of glass in the skin below her right knee  Attempted to remove glass with tweezers but unsuccessful  Unknown tetanus status  History provided by:  Patient   used: No    Foreign Body in Skin   Intake: Right knee pretibial   Suspected object:  Glass  Pain severity:  No pain  Duration:  3 days  Chronicity:  New  Worsened by:  Nothing  Ineffective treatments:  None tried  Associated symptoms: no abdominal pain, no choking, no congestion, no cough, no difficulty breathing, no nausea and no rhinorrhea    Risk factors: no prior surgery to area        Prior to Admission Medications   Prescriptions Last Dose Informant Patient Reported? Taking? amLODIPine (NORVASC) 5 mg tablet Past Month at Unknown time Self No Yes   Sig: Take 1 tablet (5 mg total) by mouth daily   escitalopram (LEXAPRO) 10 mg tablet More than a month at Unknown time Self No No   Sig: Take 1 tablet (10 mg total) by mouth daily   fluticasone (FLONASE) 50 mcg/act nasal spray 2019 at Unknown time  No Yes   Si sprays into each nostril daily   losartan (COZAAR) 100 MG tablet 2019 at Unknown time Self No Yes   Sig: Take 1 tablet (100 mg total) by mouth daily   multivitamin (THERAGRAN) TABS 2019 at Unknown time Self Yes Yes   Sig: Take 1 tablet by mouth      Facility-Administered Medications: None       Past Medical History:   Diagnosis Date    Arthritis     Depression     Heartburn     Hypertension     Hypertension     Kidney stone     Obesity     Sleep apnea        History reviewed  No pertinent surgical history      Family History   Problem Relation Age of Onset    Hypertension Family     Hypertension Mother     Alcohol abuse Father     Diabetes Father     No Known Problems Brother     Heart disease Neg Hx     Thyroid disease Neg Hx     Stroke Neg Hx      I have reviewed and agree with the history as documented  Social History     Tobacco Use    Smoking status: Light Tobacco Smoker     Types: Cigarettes    Smokeless tobacco: Current User   Substance Use Topics    Alcohol use: Yes     Frequency: 2-4 times a month     Drinks per session: 3 or 4     Binge frequency: Never     Comment: social    Drug use: No        Review of Systems   Constitutional: Negative for chills and fever  HENT: Negative for congestion and rhinorrhea  Respiratory: Negative for cough, choking and wheezing  Cardiovascular: Negative for chest pain and palpitations  Gastrointestinal: Negative for abdominal pain and nausea  Genitourinary: Negative for dysuria and hematuria  Musculoskeletal: Negative for arthralgias  Neurological: Negative for seizures, syncope, weakness, light-headedness and headaches  All other systems reviewed and are negative  Physical Exam  ED Triage Vitals [07/06/19 2116]   Temperature Pulse Respirations Blood Pressure SpO2   97 8 °F (36 6 °C) 90 16 (!) 186/113 98 %      Temp Source Heart Rate Source Patient Position - Orthostatic VS BP Location FiO2 (%)   Oral Monitor Sitting Left arm --      Pain Score       --             Orthostatic Vital Signs  Vitals:    07/06/19 2116   BP: (!) 186/113   Pulse: 90   Patient Position - Orthostatic VS: Sitting       Physical Exam   Constitutional: She is oriented to person, place, and time  She appears well-developed and well-nourished  No distress  HENT:   Head: Normocephalic and atraumatic  Cardiovascular: Normal rate and regular rhythm  No murmur heard  Pulmonary/Chest: Effort normal and breath sounds normal  No respiratory distress  She has no wheezes  She has no rales  Abdominal: Soft  Bowel sounds are normal  She exhibits no distension  There is no tenderness  There is no rebound and no guarding  Musculoskeletal:   R knee w/o effusion, tendernss  ROM normal     Neurological: She is alert and oriented to person, place, and time  Skin: Capillary refill takes less than 2 seconds  She is not diaphoretic  1 cm laceration right knee pretibial oozing blood   Psychiatric: She has a normal mood and affect  Nursing note and vitals reviewed  ED Medications  Medications   neomycin-bacitracin-polymyxin b (NEOSPORIN) ointment 1 small application (has no administration in time range)   lidocaine-epinephrine (XYLOCAINE/EPINEPHRINE) 1 %-1:100,000 injection 5 mL (5 mL Infiltration Given 7/6/19 2206)   tetanus-diphtheria-acellular pertussis (BOOSTRIX) IM injection 0 5 mL (0 5 mL Intramuscular Given 7/6/19 2205)       Diagnostic Studies  Results Reviewed     None                 XR knee 1 or 2 views right    (Results Pending)         Procedures  Procedures        ED Course                               MDM    Disposition  Final diagnoses:   Foreign body (FB) in soft tissue - Right pretibial     Time reflects when diagnosis was documented in both MDM as applicable and the Disposition within this note     Time User Action Codes Description Comment    7/6/2019  9:26 PM Abimael Sen Add [M25 561] Acute pain of right knee     7/6/2019  9:34 PM Abimael Sen Remove [M25 561] Acute pain of right knee     7/6/2019  9:34 PM Abimael Sen Add [M79 5] Foreign body (FB) in soft tissue     7/6/2019  9:34 PM Abimael Sen Modify [M79 5] Foreign body (FB) in soft tissue Right pretibial      ED Disposition     ED Disposition Condition Date/Time Comment    Discharge Stable Sat Jul 6, 2019 10:35 PM Laith Rashid discharge to home/self care              Follow-up Information     Follow up With Specialties Details Why Contact Info Additional Information    Rylee Freeman MD Family Medicine Schedule an appointment as soon as possible for a visit  To make appointment for reevaluation in 3-5 days  [address] [facility] Emergency Department Emergency Medicine Go to  For suture removal in 7-10 days  [address]  [phone]  ED, [address] [place] [address], [id number]          Patient's Medications   Discharge Prescriptions    CEPHALEXIN (KEFLEX) 250 MG CAPSULE    Take 2 capsules (500 mg total) by mouth 4 (four) times a day for 5 days       Start Date: 7/6/2019  End Date: 7/11/2019       Order Dose: 500 mg       Quantity: 40 capsule    Refills: 0     No discharge procedures on file  ED Provider  Attending physically available and evaluated Todd Quevedo I managed the patient along with the ED Attending      Electronically Signed by Danny Magana [facility],   07/06/19 6719

## 2019-07-22 ENCOUNTER — CONSULT (OUTPATIENT)
Dept: CARDIOLOGY CLINIC | Facility: CLINIC | Age: 29
End: 2019-07-22
Payer: COMMERCIAL

## 2019-07-22 VITALS
HEART RATE: 83 BPM | BODY MASS INDEX: 36.43 KG/M2 | HEIGHT: 69 IN | SYSTOLIC BLOOD PRESSURE: 144 MMHG | WEIGHT: 246 LBS | DIASTOLIC BLOOD PRESSURE: 100 MMHG

## 2019-07-22 DIAGNOSIS — E66.01 SEVERE OBESITY (BMI 35.0-39.9) WITH COMORBIDITY (HCC): Primary | ICD-10-CM

## 2019-07-22 PROCEDURE — 99243 OFF/OP CNSLTJ NEW/EST LOW 30: CPT | Performed by: INTERNAL MEDICINE

## 2019-07-22 PROCEDURE — 93000 ELECTROCARDIOGRAM COMPLETE: CPT | Performed by: INTERNAL MEDICINE

## 2019-07-22 NOTE — PROGRESS NOTES
[address] Cardiology [place]  [address]  [facility] [facility]  [phone]    Cardiology New Patient     Patient:  Kristie Strickland  :  1990  MRN:  [id number]    History of Present Illness:     27-year-old female with medical history including hypertension, obesity, possible EAMON presents for new patient cardiology evaluation prior to possible gastric bypass  She denies any chest pain or trouble breathing  She once every year so gets is secondary to a palpitations  She has not had any dizziness or syncope  She relates to me that she had a negative cardiac workup 3 years ago in [place]  She has a family history of hypertension but no cardiovascular disease  She is  with 2 children  Patient Active Problem List   Diagnosis    Chronic hypertension    History of postpartum depression    Severe obesity (BMI 35 0-39  9) with comorbidity ([address] )    Kidney stone    Snoring    Encounter for well adult exam with abnormal findings    Tobacco abuse    EAMON (obstructive sleep apnea)    Periodic limb movements of sleep    RLS (restless legs syndrome)    Acute non-recurrent maxillary sinusitis       Past Surgical History  No past surgical history on file      Social History   Social History     Socioeconomic History    Marital status: Significant Other     Spouse name: Not on file    Number of children: Not on file    Years of education: Not on file    Highest education level: Not on file   Occupational History    Not on file   Social Needs    Financial resource strain: Not on file    Food insecurity:     Worry: Not on file     Inability: Not on file    Transportation needs:     Medical: Not on file     Non-medical: Not on file   Tobacco Use    Smoking status: Light Tobacco Smoker     Types: Cigarettes    Smokeless tobacco: Current User   Substance and Sexual Activity    Alcohol use: Yes     Frequency: 2-4 times a month     Drinks per session: 3 or 4     Binge frequency: Never     Comment: social    Drug use: No    Sexual activity: Yes     Partners: Male   Lifestyle    Physical activity:     Days per week: Not on file     Minutes per session: Not on file    Stress: Not on file   Relationships    Social connections:     Talks on phone: Not on file     Gets together: Not on file     Attends Muslim service: Not on file     Active member of club or organization: Not on file     Attends meetings of clubs or organizations: Not on file     Relationship status: Not on file    Intimate partner violence:     Fear of current or ex partner: Not on file     Emotionally abused: Not on file     Physically abused: Not on file     Forced sexual activity: Not on file   Other Topics Concern    Not on file   Social History Narrative    Not on file        No Known Allergies    Family History   Family History   Problem Relation Age of Onset    Hypertension Family     Hypertension Mother     Alcohol abuse Father     Diabetes Father     No Known Problems Brother     Heart disease Neg Hx     Thyroid disease Neg Hx     Stroke Neg Hx        Review of Systems:  Review of Systems   Constitutional: Negative for chills, fatigue and fever  HENT: Negative for hearing loss, nosebleeds and tinnitus  Eyes: Negative for pain  Respiratory: Negative for cough, chest tightness and shortness of breath  Cardiovascular: Negative for chest pain, palpitations and leg swelling  Gastrointestinal: Negative for abdominal pain, nausea and vomiting  Endocrine: Negative for cold intolerance and heat intolerance  Genitourinary: Negative for difficulty urinating, hematuria and vaginal bleeding  Musculoskeletal: Negative for arthralgias  Skin: Negative for rash  Allergic/Immunologic: Negative for environmental allergies  Neurological: Negative for dizziness, syncope, weakness and light-headedness  Psychiatric/Behavioral: Negative for decreased concentration and sleep disturbance  The patient is not nervous/anxious  Current Outpatient Medications:     amLODIPine (NORVASC) 5 mg tablet, Take 1 tablet (5 mg total) by mouth daily, Disp: 30 tablet, Rfl: 1    escitalopram (LEXAPRO) 10 mg tablet, Take 1 tablet (10 mg total) by mouth daily, Disp: 30 tablet, Rfl: 1    fluticasone (FLONASE) 50 mcg/act nasal spray, 2 sprays into each nostril daily, Disp: 1 Bottle, Rfl: 2    losartan (COZAAR) 100 MG tablet, Take 1 tablet (100 mg total) by mouth daily, Disp: 30 tablet, Rfl: 1    multivitamin (THERAGRAN) TABS, Take 1 tablet by mouth, Disp: , Rfl:      Physical Exam:    Vitals:    07/22/19 1336 07/22/19 1341   Weight:  112 kg (246 lb)   Height: 5' 9" (1 753 m) 5' 9" (1 753 m)       Physical Exam   Constitutional: She is oriented to person, place, and time  She appears well-developed and well-nourished  HENT:   Head: Normocephalic  Right Ear: External ear normal    Left Ear: External ear normal    Mouth/Throat: Oropharynx is clear and moist    Eyes: Pupils are equal, round, and reactive to light  Neck: No JVD present  Carotid bruit is not present  Cardiovascular: Normal rate, regular rhythm and intact distal pulses  Exam reveals no gallop and no friction rub  No murmur heard  Pulmonary/Chest: Effort normal and breath sounds normal  No tachypnea  No respiratory distress  She has no wheezes  She has no rales  She exhibits no tenderness  Abdominal: Soft  She exhibits no distension  There is no tenderness  There is no rebound and no guarding  Musculoskeletal: She exhibits no edema  Neurological: She is alert and oriented to person, place, and time  Skin: Skin is warm and dry  Psychiatric: She has a normal mood and affect  Her behavior is normal  Judgment and thought content normal    Nursing note and vitals reviewed  Labs:not applicable    Assessment/Plan:    1  Preop evaluation prior to possible gastric bypass      She is acceptable risk for gastric bypass at this time     2  Mildly prolonged QTC-this is less than 500 milliseconds and likely secondary to Lexapro  I cautioned her to mention to her doctors when any new medicines are prescribed so as to take precautions with medicines that have the potential to prolong QTC  3  Hypertension-I encouraged her to take her amlodipine more often  She tells me she gets sleepy when her blood pressure is 006 systolic  She does note that her blood pressure is usually 140/90  I did recommend she follow-up with Cardiology after her surgery  Thank you so much, please do not hesitate to contact me with any questions or concerns        Dotty Soares MD  7/22/2019  1:42 PM

## 2019-07-24 ENCOUNTER — OFFICE VISIT (OUTPATIENT)
Dept: BARIATRICS | Facility: CLINIC | Age: 29
End: 2019-07-24

## 2019-07-24 VITALS — WEIGHT: 245.8 LBS | BODY MASS INDEX: 36.3 KG/M2

## 2019-07-24 DIAGNOSIS — E66.01 SEVERE OBESITY (BMI 35.0-39.9) WITH COMORBIDITY (HCC): Primary | ICD-10-CM

## 2019-07-24 PROCEDURE — RECHECK

## 2019-07-24 NOTE — PROGRESS NOTES
[facility] 4/6  Patient maintained her weight this month  Patient reports working on portion control and food choices; identifies it is a challenge when at family gatherings  Patient is being active walking trails 1-2 x's a week, and cleaning house  Patient is drinking 3 24 oz bottles of water a day; patient has her own support group with 3 co-workers who've recently had surgery  Talks with them and sees the habits they are developing  Reviewed workflow; patient did cardiac clearance; plans to attend [facility] in August; Patient scheduled for next month

## 2019-08-20 ENCOUNTER — ANESTHESIA EVENT (OUTPATIENT)
Dept: GASTROENTEROLOGY | Facility: HOSPITAL | Age: 29
End: 2019-08-20

## 2019-08-21 ENCOUNTER — HOSPITAL ENCOUNTER (OUTPATIENT)
Dept: GASTROENTEROLOGY | Facility: HOSPITAL | Age: 29
Setting detail: OUTPATIENT SURGERY
Discharge: HOME | End: 2019-08-21
Attending: SURGERY | Admitting: SURGERY
Payer: COMMERCIAL

## 2019-08-21 ENCOUNTER — ANESTHESIA (OUTPATIENT)
Dept: GASTROENTEROLOGY | Facility: HOSPITAL | Age: 29
End: 2019-08-21

## 2019-08-21 VITALS
WEIGHT: 244.71 LBS | DIASTOLIC BLOOD PRESSURE: 84 MMHG | TEMPERATURE: 97.7 F | HEART RATE: 71 BPM | OXYGEN SATURATION: 97 % | HEIGHT: 69 IN | SYSTOLIC BLOOD PRESSURE: 160 MMHG | BODY MASS INDEX: 36.24 KG/M2 | RESPIRATION RATE: 16 BRPM

## 2019-08-21 DIAGNOSIS — E66.01 MORBID OBESITY (HCC): ICD-10-CM

## 2019-08-21 LAB
EXT PREGNANCY TEST URINE: NEGATIVE
EXT. CONTROL: NORMAL

## 2019-08-21 PROCEDURE — 88305 TISSUE EXAM BY PATHOLOGIST: CPT | Performed by: PATHOLOGY

## 2019-08-21 PROCEDURE — 81025 URINE PREGNANCY TEST: CPT | Performed by: ANESTHESIOLOGY

## 2019-08-21 PROCEDURE — 43239 EGD BIOPSY SINGLE/MULTIPLE: CPT | Performed by: SURGERY

## 2019-08-21 RX ORDER — PROPOFOL 10 MG/ML
INJECTION, EMULSION INTRAVENOUS AS NEEDED
Status: DISCONTINUED | OUTPATIENT
Start: 2019-08-21 | End: 2019-08-21 | Stop reason: SURG

## 2019-08-21 RX ORDER — ONDANSETRON 2 MG/ML
4 INJECTION INTRAMUSCULAR; INTRAVENOUS EVERY 6 HOURS PRN
Status: DISCONTINUED | OUTPATIENT
Start: 2019-08-21 | End: 2019-08-25 | Stop reason: HOSPADM

## 2019-08-21 RX ORDER — SODIUM CHLORIDE 9 MG/ML
125 INJECTION, SOLUTION INTRAVENOUS CONTINUOUS
Status: DISCONTINUED | OUTPATIENT
Start: 2019-08-21 | End: 2019-08-25 | Stop reason: HOSPADM

## 2019-08-21 RX ADMIN — SODIUM CHLORIDE 125 ML/HR: 0.9 INJECTION, SOLUTION INTRAVENOUS at 07:06

## 2019-08-21 RX ADMIN — PROPOFOL 200 MG: 10 INJECTION, EMULSION INTRAVENOUS at 07:45

## 2019-08-21 NOTE — ANESTHESIA PREPROCEDURE EVALUATION
Review of Systems/Medical History  Patient summary reviewed  Chart reviewed  No history of anesthetic complications     Cardiovascular  Hypertension on > 1 medication,    Pulmonary  Smoker cigarette smoker  , Tobacco cessation counseling given , Sleep apnea (SNORING) ,        GI/Hepatic  Negative GI/hepatic ROS          Kidney stones,        Endo/Other    Obesity (BMI=37)    GYN  Negative gynecology ROS          Hematology  Negative hematology ROS      Musculoskeletal    Arthritis     Neurology      Comment: RLS Psychology   Depression , being treated for depression,              Physical Exam    Airway    Mallampati score: II  TM Distance: >3 FB  Neck ROM: full     Dental   No notable dental hx     Cardiovascular  Rhythm: regular, Rate: normal, Cardiovascular exam normal    Pulmonary  Pulmonary exam normal Breath sounds clear to auscultation,     Other Findings        Anesthesia Plan  ASA Score- 2     Anesthesia Type- IV sedation with anesthesia with ASA Monitors  Additional Monitors:   Airway Plan:         Plan Factors-Patient not instructed to abstain from smoking on day of procedure  Patient smoked on day of surgery  Induction- intravenous  Postoperative Plan-     Informed Consent- Anesthetic plan and risks discussed with patient and spouse  I personally reviewed this patient with the CRNA  Discussed and agreed on the Anesthesia Plan with the CRNA  James Gaspar

## 2019-08-23 ENCOUNTER — OFFICE VISIT (OUTPATIENT)
Dept: BARIATRICS | Facility: CLINIC | Age: 29
End: 2019-08-23

## 2019-08-23 VITALS — BODY MASS INDEX: 36.28 KG/M2 | WEIGHT: 245.7 LBS

## 2019-08-23 DIAGNOSIS — Z01.818 PREOP TESTING: ICD-10-CM

## 2019-08-23 DIAGNOSIS — F17.200 SMOKER: ICD-10-CM

## 2019-08-23 DIAGNOSIS — Z98.84 BARIATRIC SURGERY STATUS: Primary | ICD-10-CM

## 2019-08-23 DIAGNOSIS — E66.9 OBESITY, CLASS II, BMI 35-39.9: Primary | ICD-10-CM

## 2019-08-23 PROCEDURE — RECHECK

## 2019-08-23 NOTE — PROGRESS NOTES
Patient met with TRAVIS reviewed that she is doing well trying to maintain her weight although she is getting frustrated with the limited foods she can eat  Patient is trying to stay in designated BMI range for surgery, is looking forward to being able to explore more healthy food options and be active  TRAVIS reminded patient of getting labs done, including nicotine test and to attend support group  Patient said she will be going to the next support group at [facility] and that she did have her labs done  She also asked questions about when she needed to stop smoking in order to test negative for nicotine  TRAVIS consulted with ALE Malcolm about labs and nicotine test, ALE and TRAVIS both met with patient to give information  RD ordered labs since the ones that were done in March are going to  and let patient know that it takes about 6-8 weeks for nicotine to be completely out of the system  She was encouraged to stop smoking soon so she surgery date will not be delayed  Patient voiced understanding, stated that she wished she knew sooner that she needed to quit smoking  Patient was encouraged to talk with her PCP about a smoking cessation treatment that doesn't have any nicotine in it if she needs the additional support  Patient is scheduled with ELVIN Carbajal in September

## 2019-08-26 DIAGNOSIS — Z72.0 TOBACCO ABUSE: Primary | ICD-10-CM

## 2019-08-26 RX ORDER — VARENICLINE TARTRATE 25 MG
KIT ORAL
Qty: 53 TABLET | Refills: 0 | Status: SHIPPED | OUTPATIENT
Start: 2019-08-26 | End: 2019-11-27

## 2019-08-26 NOTE — TELEPHONE ENCOUNTER
Patient was in today with her child wanted to know if Chantix can be sent into the pharmacy for her she is going for Bariatric surgery and needs to stop smoking would like this sent to [facility]

## 2019-08-27 DIAGNOSIS — A04.8 H. PYLORI INFECTION: Primary | ICD-10-CM

## 2019-08-28 RX ORDER — OMEPRAZOLE 20 MG/1
20 CAPSULE, DELAYED RELEASE ORAL 2 TIMES DAILY
Qty: 28 CAPSULE | Refills: 0 | Status: SHIPPED | OUTPATIENT
Start: 2019-08-28 | End: 2019-09-24 | Stop reason: ALTCHOICE

## 2019-08-28 RX ORDER — CLARITHROMYCIN 500 MG/1
500 TABLET, COATED ORAL 2 TIMES DAILY
Qty: 28 TABLET | Refills: 0 | Status: SHIPPED | OUTPATIENT
Start: 2019-08-28 | End: 2019-09-11

## 2019-08-28 RX ORDER — AMOXICILLIN 500 MG/1
1000 CAPSULE ORAL 2 TIMES DAILY
Qty: 56 CAPSULE | Refills: 0 | Status: SHIPPED | OUTPATIENT
Start: 2019-08-28 | End: 2019-09-11

## 2019-09-19 NOTE — PROGRESS NOTES
Assessment/Plan:    Severe obesity (BMI 35 0-39  9) with comorbidity ([address] )  Interested in Vertical Sleeve Gastrectomy with Dr Esther Kingsley  10% Weight loss-Not applicable   Screening labs-Not Completed  Support Group-Not Completed  Cardiac Risk Assessment-From consult with  Dr Juan Coello 7/22/19 "1  Preop evaluation prior to possible gastric bypass  She is acceptable risk for gastric bypass at this time    2  Mildly prolonged QTC-this is less than 500 milliseconds and likely secondary to Lexapro  I cautioned her to mention to her doctors when any new medicines are prescribed so as to take precautions with medicines that have the potential to prolong QTC " Pt should take famotidine instead of PPI  Staff message sent to surgeon and alert to be created in [address]  Advised pt of recommendations by cardiologist  Upper Endoscopy-Completed; H  Pylori biopsy-Positive, therapy prescribed- yes, but completed all but 2 days- will discuss with surgeon  Sleep Medicine Assessment-negative PSG 3/13/19  Alcohol and nicotine use is not recommended following bariatric surgery  NSAIDs should be avoided following bariatric surgery  Advised that pregnancy should be avoided following bariatric surgery for 12-18 months and should not solely rely on OCP and consider additional/alternative sources for contraception due to potential absorption issues-she is currently sexually active and not using protection  Strongly advised her to see GYN to review contraception options and reviewed risks of pregnancy within 12-18 months following surgery    Unfortunately has been gaining weight and attributes this to trying to quit smoking  Has also been busy caring for her grandmother who recently had a stroke    She questioned having nicotine test done 2 weeks after smoking   Advised against this as nicotine may be present in blood for 30 days and if positive will need repeat testing and cannot guarantee insurance coverage    Chronic hypertension  -on losartan and amlodipine    Heartburn  -no longer taking omeprazole as sx are not occurring daily  -recommend she avoid PPIs and if having sx more frequently get Rx for famotidine  -taking tums prn    H  pylori infection  triple therapy: completed all but two days- will discuss with the surgeon due to risk of prolonged QTC    Follow up: recommended one month f/u with RD due to weight gain, but she declined  She will have labs done when she quits smoking and will go to support group to complete pre-op requirements    Goals:  Food log (ie ) www myfitnesspal com,sparkpeople  com,loseit com,calorieking  com,etc  baritastic  No sugary beverages  At least 64oz of water daily  Increase physical activity by 10 minutes daily  Gradually increase physical activity to a goal of 5 days per week for 30 minutes of MODERATE intensity PLUS 2 days per week of FULL BODY resistance training  Practice lesson plans 1-6 in bariatric manual  and Practice 30/60 rule  Goal protein intake of 60-80 grams per day  Please have labs done  Please attend support group  Alcohol and nicotine use is not recommended following bariatric surgery  NSAIDs (Motrin, Advil, Aleve, Naproxen, ibuprofen, etc) should be avoided following bariatric surgery     Diagnoses and all orders for this visit:    Severe obesity (BMI 35 0-39  9) with comorbidity ([address] )    Chronic hypertension    Body mass index 36 0-36 9, adult    H  pylori infection    Tobacco abuse    Other orders  -     ferrous sulfate 325 (65 Fe) mg tablet; take 1 by Oral route once  -     mometasone (NASONEX) 50 mcg/act nasal spray; spray 2 spray by intranasal route  every day in each nostril        Subjective:   Chief Complaint   Patient presents with    Weight Check     6/6 weight check      Patient ID: Tena Box  is a 30 y o  female with excess weight/obesity here to pursue weight management  Patient is pursuing Vertical Sleeve Gastrectomy  HPI  The pt presents for 6/6 weight check   The patient has been:  Following the lessons in the manual- yes  Practicing the 30/60 minute rule- yes  Food logging- unless caring for her grandmother who recently had a stroke, had been eating similarly each day  Exercise- walking 2-3x/week  Alcohol- yes- weekends  Reviewed that it is not recommended following surgery  Tobacco- yes  NSAIDs- no  Carbonated beverages- yes- seltzer if dining out- advised to try to avoid    The following portions of the patient's history were reviewed and updated as appropriate: allergies, current medications, past family history, past medical history, past social history, past surgical history and problem list     Review of Systems   Respiratory: Negative  Cardiovascular: Negative  Gastrointestinal: Negative  Psychiatric/Behavioral: Negative  Objective:    /80 (BP Location: Left arm, Patient Position: Sitting, Cuff Size: Adult)   Pulse 84   Temp 97 8 °F (36 6 °C) (Tympanic)   Resp 16   Ht 5' 9" (1 753 m)   Wt 112 kg (248 lb)   BMI 36 62 kg/m²      Physical Exam   Nursing note and vitals reviewed  Constitutional   General appearance: Abnormal   well developed and obese  Eyes No conjunctival pallor  Ears, Nose, Mouth, and Throat Oral mucosa moist    Pulmonary   Respiratory effort: No increased work of breathing or signs of respiratory distress  Auscultation of lungs: Clear to auscultation, equal breath sounds bilaterally, no wheezes, no rales, no rhonci  Cardiovascular   Auscultation of heart: Normal rate and rhythm, normal S1 and S2, without murmurs  Examination of extremities for edema and/or varicosities: Normal   no edema  Abdomen   Abdomen: Abnormal   The abdomen was obese  Bowel sounds were normal  The abdomen was soft and nontender     Musculoskeletal   Gait and station: Normal     Psychiatric   Orientation to person, place and time: Normal     Affect: appropriate

## 2019-09-19 NOTE — ASSESSMENT & PLAN NOTE
Interested in Vertical Sleeve Gastrectomy with Dr Omar Rivera  10% Weight loss-Not applicable   Screening labs-Not Completed  Support Group-Not Completed  Cardiac Risk Assessment-From consult with  Dr David Hester 7/22/19 "1  Preop evaluation prior to possible gastric bypass  She is acceptable risk for gastric bypass at this time    2  Mildly prolonged QTC-this is less than 500 milliseconds and likely secondary to Lexapro  I cautioned her to mention to her doctors when any new medicines are prescribed so as to take precautions with medicines that have the potential to prolong QTC " Pt should take famotidine instead of PPI  Staff message sent to surgeon and alert to be created in [address]  Advised pt of recommendations by cardiologist  Upper Endoscopy-Completed; H  Pylori biopsy-Positive, therapy prescribed- yes, but completed all but 2 days- will discuss with surgeon  Sleep Medicine Assessment-negative PSG 3/13/19  Alcohol and nicotine use is not recommended following bariatric surgery  NSAIDs should be avoided following bariatric surgery  Advised that pregnancy should be avoided following bariatric surgery for 12-18 months and should not solely rely on OCP and consider additional/alternative sources for contraception due to potential absorption issues-she is currently sexually active and not using protection  Strongly advised her to see GYN to review contraception options and reviewed risks of pregnancy within 12-18 months following surgery    Unfortunately has been gaining weight and attributes this to trying to quit smoking  Has also been busy caring for her grandmother who recently had a stroke    She questioned having nicotine test done 2 weeks after smoking   Advised against this as nicotine may be present in blood for 30 days and if positive will need repeat testing and cannot guarantee insurance coverage

## 2019-09-20 ENCOUNTER — TELEPHONE (OUTPATIENT)
Dept: BARIATRICS | Facility: CLINIC | Age: 29
End: 2019-09-20

## 2019-09-20 NOTE — TELEPHONE ENCOUNTER
Tried calling the pt, nina for pt to return call  When she calls back please inform her she should avoid taking omperazole/prilosec or nexium as they can cause an abnormal heart rhythm when combined with her current medications  I believe her cardiologist may have reviewed this with her  If she is having heart burn sx we can switch her to a medication called famotidine that would not have these risks  We can discuss this further at her 9/24/19 appt as well    Thank you!

## 2019-09-20 NOTE — TELEPHONE ENCOUNTER
I will check to see if she is still taking the omeprazole  One,   Can you add that pt should be started on an H2 blocker instead of PPI due to risk for QT prolongation in the alert? Thank you,   Ehsan Call PA-C     ----- Message -----   From: Rhonda Cutler MD   Sent: 9/19/2019   3:47 PM EDT   To: Fermin Chun PA-C     Please change her to h2 blocker instead   ----- Message -----   From: Fermin Chun PA-C   Sent: 9/19/2019  12:36 PM EDT   To: Susanna Rocha MD     One,     Please make an alert in [facility]  This was from the patients cardiologist visit:   "Mildly prolonged QTC-this is less than 500 milliseconds and likely secondary to [address] cautioned her to mention to her doctors when any new medicines are prescribed so as to take precautions with medicines that have the potential to prolong QTC "     Dr Lilia Ricketts,   I think there is some evidence that omeprazole and esomeprazole may increase the QT prolongation with citalopram, so I am assuming it will be the same with Lexapro  As far as I am aware it is not the case for pantoprazole and lansoprazole  I just wanted to give you the heads up       Thank you,   Ehsan Call PA-C

## 2019-09-24 ENCOUNTER — OFFICE VISIT (OUTPATIENT)
Dept: BARIATRICS | Facility: CLINIC | Age: 29
End: 2019-09-24
Payer: COMMERCIAL

## 2019-09-24 VITALS
TEMPERATURE: 97.8 F | WEIGHT: 248 LBS | HEIGHT: 69 IN | BODY MASS INDEX: 36.73 KG/M2 | HEART RATE: 84 BPM | DIASTOLIC BLOOD PRESSURE: 80 MMHG | RESPIRATION RATE: 16 BRPM | SYSTOLIC BLOOD PRESSURE: 128 MMHG

## 2019-09-24 DIAGNOSIS — Z72.0 TOBACCO ABUSE: ICD-10-CM

## 2019-09-24 DIAGNOSIS — A04.8 H. PYLORI INFECTION: ICD-10-CM

## 2019-09-24 DIAGNOSIS — I10 CHRONIC HYPERTENSION: ICD-10-CM

## 2019-09-24 DIAGNOSIS — E66.01 SEVERE OBESITY (BMI 35.0-39.9) WITH COMORBIDITY (HCC): Primary | ICD-10-CM

## 2019-09-24 PROBLEM — R12 HEARTBURN: Status: ACTIVE | Noted: 2019-09-24

## 2019-09-24 PROCEDURE — 99214 OFFICE O/P EST MOD 30 MIN: CPT | Performed by: PHYSICIAN ASSISTANT

## 2019-09-24 PROCEDURE — 3079F DIAST BP 80-89 MM HG: CPT | Performed by: PHYSICIAN ASSISTANT

## 2019-09-24 PROCEDURE — 3074F SYST BP LT 130 MM HG: CPT | Performed by: PHYSICIAN ASSISTANT

## 2019-09-24 RX ORDER — FERROUS SULFATE 325(65) MG
TABLET ORAL
COMMUNITY
Start: 2018-01-26 | End: 2019-11-27

## 2019-09-24 RX ORDER — AMLODIPINE BESYLATE 5 MG/1
5 TABLET ORAL DAILY
Qty: 30 TABLET | Refills: 1 | Status: SHIPPED | OUTPATIENT
Start: 2019-09-24 | End: 2019-12-05 | Stop reason: HOSPADM

## 2019-09-24 RX ORDER — LOSARTAN POTASSIUM 100 MG/1
100 TABLET ORAL DAILY
Qty: 30 TABLET | Refills: 1 | Status: SHIPPED | OUTPATIENT
Start: 2019-09-24 | End: 2019-12-05 | Stop reason: HOSPADM

## 2019-09-24 RX ORDER — MOMETASONE FUROATE 50 UG/1
2 SPRAY, METERED NASAL AS NEEDED
COMMUNITY
Start: 2018-01-26

## 2019-09-24 NOTE — ASSESSMENT & PLAN NOTE
triple therapy: completed all but two days- will discuss with the surgeon due to risk of prolonged QTC

## 2019-09-24 NOTE — PATIENT INSTRUCTIONS
Goals: Food log (ie ) www myfitnesspal com,sparkpeople  com,loseit com,calorieking  com,etc  baritastic  No sugary beverages  At least 64oz of water daily  Increase physical activity by 10 minutes daily   Gradually increase physical activity to a goal of 5 days per week for 30 minutes of MODERATE intensity PLUS 2 days per week of FULL BODY resistance training  Practice lesson plans 1-6 in bariatric manual  and Practice 30/60 rule  Goal protein intake of 60-80 grams per day  Please have labs done  Please attend support group  Alcohol and nicotine use is not recommended following bariatric surgery  NSAIDs (Motrin, Advil, Aleve, Naproxen, ibuprofen, etc) should be avoided following bariatric surgery

## 2019-09-24 NOTE — ASSESSMENT & PLAN NOTE
-no longer taking omeprazole as sx are not occurring daily  -recommend she avoid PPIs and if having sx more frequently get Rx for famotidine  -taking tums prn

## 2019-09-26 ENCOUNTER — TELEPHONE (OUTPATIENT)
Dept: BARIATRICS | Facility: CLINIC | Age: 29
End: 2019-09-26

## 2019-09-26 NOTE — TELEPHONE ENCOUNTER
I saw pt for a weight check and she reported that she completed all but 2 days of triple therapy for +H  pylori  She has prolonged QT syndrome, so I'm not sure that triple therapy is a good option for her  How would you like me to proceed?

## 2019-10-07 NOTE — TELEPHONE ENCOUNTER
Reviewed with Dr Valencia Or  She does not need any additional treatment and should not finish the remaining medication from the previous treatment (prevpack)  Per Dr Valencia Or, no f/u testing needed  Please call her and let her know no additional treatment is needed for the H  Pylori

## 2019-10-25 ENCOUNTER — LAB (OUTPATIENT)
Dept: LAB | Facility: HOSPITAL | Age: 29
End: 2019-10-25
Payer: COMMERCIAL

## 2019-10-25 DIAGNOSIS — G25.81 RLS (RESTLESS LEGS SYNDROME): ICD-10-CM

## 2019-10-25 DIAGNOSIS — Z98.84 BARIATRIC SURGERY STATUS: ICD-10-CM

## 2019-10-25 DIAGNOSIS — Z01.818 PREOP TESTING: ICD-10-CM

## 2019-10-25 DIAGNOSIS — F17.200 SMOKER: ICD-10-CM

## 2019-10-25 LAB
ALBUMIN SERPL BCP-MCNC: 4 G/DL (ref 3–5.2)
ALP SERPL-CCNC: 85 U/L (ref 43–122)
ALT SERPL W P-5'-P-CCNC: 65 U/L (ref 9–52)
ANION GAP SERPL CALCULATED.3IONS-SCNC: 6 MMOL/L (ref 5–14)
AST SERPL W P-5'-P-CCNC: 44 U/L (ref 14–36)
BILIRUB SERPL-MCNC: 0.6 MG/DL
BUN SERPL-MCNC: 13 MG/DL (ref 5–25)
CALCIUM SERPL-MCNC: 8.8 MG/DL (ref 8.4–10.2)
CHLORIDE SERPL-SCNC: 104 MMOL/L (ref 97–108)
CHOLEST SERPL-MCNC: 204 MG/DL
CO2 SERPL-SCNC: 26 MMOL/L (ref 22–30)
CREAT SERPL-MCNC: 0.65 MG/DL (ref 0.6–1.2)
ERYTHROCYTE [DISTWIDTH] IN BLOOD BY AUTOMATED COUNT: 13.8 %
EST. AVERAGE GLUCOSE BLD GHB EST-MCNC: 105 MG/DL
FERRITIN SERPL-MCNC: 35 NG/ML (ref 8–388)
GFR SERPL CREATININE-BSD FRML MDRD: 119 ML/MIN/1.73SQ M
GLUCOSE P FAST SERPL-MCNC: 93 MG/DL (ref 70–99)
HBA1C MFR BLD: 5.3 % (ref 4.2–6.3)
HCT VFR BLD AUTO: 42.6 % (ref 36–46)
HDLC SERPL-MCNC: 41 MG/DL
HGB BLD-MCNC: 13.9 G/DL (ref 12–16)
IRON SERPL-MCNC: 91 UG/DL (ref 50–170)
LDLC SERPL CALC-MCNC: 142 MG/DL
MCH RBC QN AUTO: 29.8 PG (ref 26–34)
MCHC RBC AUTO-ENTMCNC: 32.6 G/DL (ref 31–36)
MCV RBC AUTO: 91 FL (ref 80–100)
NONHDLC SERPL-MCNC: 163 MG/DL
PLATELET # BLD AUTO: 251 THOUSANDS/UL (ref 150–450)
PMV BLD AUTO: 9.5 FL (ref 8.9–12.7)
POTASSIUM SERPL-SCNC: 4.1 MMOL/L (ref 3.6–5)
PROT SERPL-MCNC: 6.9 G/DL (ref 5.9–8.4)
RBC # BLD AUTO: 4.67 MILLION/UL (ref 4–5.2)
SODIUM SERPL-SCNC: 136 MMOL/L (ref 137–147)
TIBC SERPL-MCNC: 331 UG/DL (ref 250–450)
TRIGL SERPL-MCNC: 104 MG/DL
TSH SERPL DL<=0.05 MIU/L-ACNC: 1.21 UIU/ML (ref 0.47–4.68)
WBC # BLD AUTO: 9.9 THOUSAND/UL (ref 4.5–11)

## 2019-10-25 PROCEDURE — 85027 COMPLETE CBC AUTOMATED: CPT

## 2019-10-25 PROCEDURE — 83540 ASSAY OF IRON: CPT

## 2019-10-25 PROCEDURE — 83550 IRON BINDING TEST: CPT

## 2019-10-25 PROCEDURE — 36415 COLL VENOUS BLD VENIPUNCTURE: CPT

## 2019-10-25 PROCEDURE — 84443 ASSAY THYROID STIM HORMONE: CPT

## 2019-10-25 PROCEDURE — 80323 ALKALOIDS NOS: CPT

## 2019-10-25 PROCEDURE — 82728 ASSAY OF FERRITIN: CPT

## 2019-10-25 PROCEDURE — 80061 LIPID PANEL: CPT

## 2019-10-25 PROCEDURE — 83036 HEMOGLOBIN GLYCOSYLATED A1C: CPT

## 2019-10-25 PROCEDURE — 80053 COMPREHEN METABOLIC PANEL: CPT

## 2019-10-30 LAB
COTININE SERPL-MCNC: NORMAL NG/ML
NICOTINE SERPL-MCNC: NORMAL NG/ML

## 2019-11-04 PROBLEM — E66.9 LIFELONG OBESITY: Status: ACTIVE | Noted: 2019-11-04

## 2019-11-04 PROBLEM — I10 BENIGN ESSENTIAL HYPERTENSION: Status: ACTIVE | Noted: 2019-11-04

## 2019-11-14 ENCOUNTER — OFFICE VISIT (OUTPATIENT)
Dept: BARIATRICS | Facility: CLINIC | Age: 29
End: 2019-11-14
Payer: COMMERCIAL

## 2019-11-14 VITALS
BODY MASS INDEX: 37.25 KG/M2 | TEMPERATURE: 96.9 F | WEIGHT: 251.5 LBS | DIASTOLIC BLOOD PRESSURE: 80 MMHG | SYSTOLIC BLOOD PRESSURE: 136 MMHG | HEIGHT: 69 IN | HEART RATE: 71 BPM

## 2019-11-14 DIAGNOSIS — E66.01 SEVERE OBESITY (BMI 35.0-39.9) WITH COMORBIDITY (HCC): Primary | ICD-10-CM

## 2019-11-14 PROCEDURE — 99214 OFFICE O/P EST MOD 30 MIN: CPT | Performed by: SURGERY

## 2019-11-14 RX ORDER — GABAPENTIN 300 MG/1
600 CAPSULE ORAL ONCE
Status: CANCELLED | OUTPATIENT
Start: 2019-12-03 | End: 2019-11-14

## 2019-11-14 RX ORDER — CEFAZOLIN SODIUM 2 G/50ML
2000 SOLUTION INTRAVENOUS ONCE
Status: CANCELLED | OUTPATIENT
Start: 2019-12-03 | End: 2019-11-14

## 2019-11-14 RX ORDER — CELECOXIB 200 MG/1
200 CAPSULE ORAL ONCE
Status: CANCELLED | OUTPATIENT
Start: 2019-12-03 | End: 2019-11-14

## 2019-11-14 RX ORDER — SCOLOPAMINE TRANSDERMAL SYSTEM 1 MG/1
1 PATCH, EXTENDED RELEASE TRANSDERMAL ONCE
Status: CANCELLED | OUTPATIENT
Start: 2019-12-03 | End: 2019-11-14

## 2019-11-14 RX ORDER — ACETAMINOPHEN 325 MG/1
975 TABLET ORAL ONCE
Status: CANCELLED | OUTPATIENT
Start: 2019-12-03 | End: 2019-11-14

## 2019-11-14 NOTE — H&P (VIEW-ONLY)
BARIATRIC HISTORY AND PHYSICAL - BARIATRIC SURGERY  Mehdi West 30 y o  female MRN: [id number]  Unit/Bed#:  Encounter: [id number]      HPI:  Mehdi West is a 30 y o  female who presents to review her preoperative workup and see if she is a good candidate to undergo a bariatric procedure    Review of Systems   Constitutional: Negative  HENT: Negative  Eyes: Negative  Respiratory: Negative  Cardiovascular: Negative  Gastrointestinal: Negative  Endocrine: Negative  Genitourinary: Negative  Musculoskeletal: Negative  Skin: Negative  Neurological: Negative  Hematological: Negative  Psychiatric/Behavioral: Negative  Historical Information   Past Medical History:   Diagnosis Date    Arthritis     Depression     Heartburn     Hypertension     Hypertension     Kidney stone     Obesity     Sleep apnea     No cpap     History reviewed  No pertinent surgical history  Social History   Social History     Substance and Sexual Activity   Alcohol Use Yes    Frequency: 2-4 times a month    Drinks per session: 3 or 4    Binge frequency: Never    Comment: social     Social History     Substance and Sexual Activity   Drug Use No     Social History     Tobacco Use   Smoking Status Former Smoker    Packs/day: 0 50    Types: Cigarettes    Last attempt to quit: 10/4/2019    Years since quittin 1   Smokeless Tobacco Current User     Family History: non-contributory    Meds/Allergies   all medications and allergies reviewed  No Known Allergies    Objective     Current Vitals:   Blood Pressure: 136/80 (19 1256)  Pulse: 71 (19 1256)  Temperature: (!) 96 9 °F (36 1 °C) (19 1256)  Temp Source: Tympanic (19 1256)  Height: 5' 9" (175 3 cm) (19 1256)  Weight - Scale: 114 kg (251 lb 8 oz) (19 1256)  bowel movement  [unfilled]    Invasive Devices     None                 Physical Exam   Constitutional: She is oriented to person, place, and time  She appears well-developed  HENT:   Head: Normocephalic and atraumatic  Eyes: Conjunctivae and EOM are normal    Neck: Normal range of motion  Neck supple  Cardiovascular: Normal rate, regular rhythm, normal heart sounds and intact distal pulses  Pulmonary/Chest: Effort normal and breath sounds normal    Abdominal: Soft  Bowel sounds are normal    Musculoskeletal: Normal range of motion  Neurological: She is alert and oriented to person, place, and time  She has normal reflexes  Skin: Skin is warm and dry  Psychiatric: She has a normal mood and affect  Lab Results: I have personally reviewed pertinent lab results  Imaging: I have personally reviewed pertinent reports  EKG, Pathology, and Other Studies: I have personally reviewed pertinent reports  Code Status: [unfilled]  Advance Directive and Living Will:      Power of :    POLST:      Assessment/Plan:      The patient presented to review the preoperative workup and see if bariatric surgery is appropriate and indicated following the extensive preoperative workup and the enrollment in our weight loss program    Preoperative workup was complete  Results were reviewed with the patient including the blood work results and the endoscopy findings and the biopsy results  We also reviewed the cardiology evaluation  I believe that the patient will be a good candidate for laparoscopic sleeve gastrectomy  Patient will need to start the 2 week liquid diet prior to surgery  Risks and benefits explained one more time to the patient  Alternatives to surgery and alternative forms of surgery were also explained  Post-surgical commitment and after care programs were explained  We also discussed that the [employer] robot may be available to us to use on the day of the patient procedure  and that the procedure may be performed robotically    I discussed in details the advantages and disadvantages of this approach including the potential decrease in postoperative pain because of the remote center technology  I also mentioned the lack of strong evidence for  the use of robot in bariatric patients and the potential disadvantage to patients because of the prolonged operative time  Consent was signed  Questions were answered and concerns were addressed  Patient wishes to proceed  As per  [address] [place] [address] guidelines, I had a discussion with the patient regarding his CODE STATUS in the perioperative period and the patient is level 1 or FULL CODE STATUS

## 2019-11-14 NOTE — PROGRESS NOTES
BARIATRIC HISTORY AND PHYSICAL - BARIATRIC SURGERY  Kurtis Baldwin 30 y o  female MRN: [id number]  Unit/Bed#:  Encounter: [id number]      HPI:  Kurtis Baldwin is a 30 y o  female who presents to review her preoperative workup and see if she is a good candidate to undergo a bariatric procedure    Review of Systems   Constitutional: Negative  HENT: Negative  Eyes: Negative  Respiratory: Negative  Cardiovascular: Negative  Gastrointestinal: Negative  Endocrine: Negative  Genitourinary: Negative  Musculoskeletal: Negative  Skin: Negative  Neurological: Negative  Hematological: Negative  Psychiatric/Behavioral: Negative  Historical Information   Past Medical History:   Diagnosis Date    Arthritis     Depression     Heartburn     Hypertension     Hypertension     Kidney stone     Obesity     Sleep apnea     No cpap     History reviewed  No pertinent surgical history  Social History   Social History     Substance and Sexual Activity   Alcohol Use Yes    Frequency: 2-4 times a month    Drinks per session: 3 or 4    Binge frequency: Never    Comment: social     Social History     Substance and Sexual Activity   Drug Use No     Social History     Tobacco Use   Smoking Status Former Smoker    Packs/day: 0 50    Types: Cigarettes    Last attempt to quit: 10/4/2019    Years since quittin 1   Smokeless Tobacco Current User     Family History: non-contributory    Meds/Allergies   all medications and allergies reviewed  No Known Allergies    Objective     Current Vitals:   Blood Pressure: 136/80 (19 1256)  Pulse: 71 (19 1256)  Temperature: (!) 96 9 °F (36 1 °C) (19 1256)  Temp Source: Tympanic (19 1256)  Height: 5' 9" (175 3 cm) (19 1256)  Weight - Scale: 114 kg (251 lb 8 oz) (19 1256)  bowel movement  [unfilled]    Invasive Devices     None                 Physical Exam   Constitutional: She is oriented to person, place, and time  She appears well-developed  HENT:   Head: Normocephalic and atraumatic  Eyes: Conjunctivae and EOM are normal    Neck: Normal range of motion  Neck supple  Cardiovascular: Normal rate, regular rhythm, normal heart sounds and intact distal pulses  Pulmonary/Chest: Effort normal and breath sounds normal    Abdominal: Soft  Bowel sounds are normal    Musculoskeletal: Normal range of motion  Neurological: She is alert and oriented to person, place, and time  She has normal reflexes  Skin: Skin is warm and dry  Psychiatric: She has a normal mood and affect  Lab Results: I have personally reviewed pertinent lab results  Imaging: I have personally reviewed pertinent reports  EKG, Pathology, and Other Studies: I have personally reviewed pertinent reports  Code Status: [unfilled]  Advance Directive and Living Will:      Power of :    POLST:      Assessment/Plan:      The patient presented to review the preoperative workup and see if bariatric surgery is appropriate and indicated following the extensive preoperative workup and the enrollment in our weight loss program    Preoperative workup was complete  Results were reviewed with the patient including the blood work results and the endoscopy findings and the biopsy results  We also reviewed the cardiology evaluation  I believe that the patient will be a good candidate for laparoscopic sleeve gastrectomy  Patient will need to start the 2 week liquid diet prior to surgery  Risks and benefits explained one more time to the patient  Alternatives to surgery and alternative forms of surgery were also explained  Post-surgical commitment and after care programs were explained  We also discussed that the [employer] robot may be available to us to use on the day of the patient procedure  and that the procedure may be performed robotically    I discussed in details the advantages and disadvantages of this approach including the potential decrease in postoperative pain because of the remote center technology  I also mentioned the lack of strong evidence for  the use of robot in bariatric patients and the potential disadvantage to patients because of the prolonged operative time  Consent was signed  Questions were answered and concerns were addressed  Patient wishes to proceed  As per  [address] [place] [address] guidelines, I had a discussion with the patient regarding his CODE STATUS in the perioperative period and the patient is level 1 or FULL CODE STATUS

## 2019-11-15 DIAGNOSIS — E66.01 MORBID (SEVERE) OBESITY DUE TO EXCESS CALORIES (HCC): Primary | ICD-10-CM

## 2019-11-15 RX ORDER — OXYCODONE HYDROCHLORIDE 5 MG/1
5 TABLET ORAL EVERY 4 HOURS PRN
Qty: 15 TABLET | Refills: 0 | Status: SHIPPED | OUTPATIENT
Start: 2019-11-15 | End: 2020-01-08 | Stop reason: ALTCHOICE

## 2019-11-15 RX ORDER — OMEPRAZOLE 20 MG/1
20 CAPSULE, DELAYED RELEASE ORAL DAILY
Qty: 90 CAPSULE | Refills: 1 | Status: SHIPPED | OUTPATIENT
Start: 2019-11-15 | End: 2019-12-05 | Stop reason: HOSPADM

## 2019-11-26 ENCOUNTER — TELEPHONE (OUTPATIENT)
Dept: BARIATRICS | Facility: CLINIC | Age: 29
End: 2019-11-26

## 2019-11-26 NOTE — TELEPHONE ENCOUNTER
Pre-op call was made to patient, spoke with patient, to follow up on how they are doing and to remind them to continue with all medical and dietary directions that were given at pre-op class regarding liver shrinking diet and hydration  They were encouraged to purchase all vitamins and protein shakes for post op use as well as to begin Miralax three days prior to surgery as directed in Section 6 of their manual   They were reminded of the Ensure Pre-surgery drinks protocol and to bring their completed yellow form with them to surgery as well as their CPAP-BiPAP machine if they use one  Lastly, they were informed that they would be weighed the morning of surgery and to give the office a call if they had any further questions or concerns

## 2019-11-27 RX ORDER — ACETAMINOPHEN 325 MG/1
650 TABLET ORAL EVERY 6 HOURS PRN
COMMUNITY
End: 2021-06-17 | Stop reason: ALTCHOICE

## 2019-11-27 NOTE — PRE-PROCEDURE INSTRUCTIONS
Pre-Surgery Instructions:   Medication Instructions    acetaminophen (TYLENOL) 325 mg tablet Instructed patient per Anesthesia Guidelines   amLODIPine (NORVASC) 5 mg tablet Instructed patient per Anesthesia Guidelines   escitalopram (LEXAPRO) 10 mg tablet Instructed patient per Anesthesia Guidelines   fluticasone (FLONASE) 50 mcg/act nasal spray Instructed patient per Anesthesia Guidelines   losartan (COZAAR) 100 MG tablet Instructed patient per Anesthesia Guidelines   mometasone (NASONEX) 50 mcg/act nasal spray Instructed patient per Anesthesia Guidelines  Per anesthesia patient was told to stop NSAIDS and supplements one week preop and on DOS may take Norvasc with sip of water  Was reminded to bring paper showing documentation of when nutritional drinks were consumed

## 2019-12-02 ENCOUNTER — ANESTHESIA EVENT (OUTPATIENT)
Dept: PERIOP | Facility: HOSPITAL | Age: 29
DRG: 403 | End: 2019-12-02
Payer: COMMERCIAL

## 2019-12-03 ENCOUNTER — ANESTHESIA (OUTPATIENT)
Dept: PERIOP | Facility: HOSPITAL | Age: 29
DRG: 403 | End: 2019-12-03
Payer: COMMERCIAL

## 2019-12-03 ENCOUNTER — HOSPITAL ENCOUNTER (INPATIENT)
Facility: HOSPITAL | Age: 29
LOS: 2 days | Discharge: HOME | DRG: 403 | End: 2019-12-05
Attending: SURGERY | Admitting: SURGERY
Payer: COMMERCIAL

## 2019-12-03 DIAGNOSIS — I10 CHRONIC HYPERTENSION: Primary | ICD-10-CM

## 2019-12-03 DIAGNOSIS — Z98.84 BARIATRIC SURGERY STATUS: ICD-10-CM

## 2019-12-03 PROBLEM — I16.0 HYPERTENSIVE URGENCY: Status: ACTIVE | Noted: 2019-12-03

## 2019-12-03 LAB
EXT PREGNANCY TEST URINE: NEGATIVE
EXT. CONTROL: NORMAL

## 2019-12-03 PROCEDURE — 0DJ08ZZ INSPECTION OF UPPER INTESTINAL TRACT, VIA NATURAL OR ARTIFICIAL OPENING ENDOSCOPIC: ICD-10-PCS | Performed by: SURGERY

## 2019-12-03 PROCEDURE — 43644 LAP GASTRIC BYPASS/ROUX-EN-Y: CPT | Performed by: SURGERY

## 2019-12-03 PROCEDURE — 8E0W4CZ ROBOTIC ASSISTED PROCEDURE OF TRUNK REGION, PERCUTANEOUS ENDOSCOPIC APPROACH: ICD-10-PCS | Performed by: SURGERY

## 2019-12-03 PROCEDURE — 81025 URINE PREGNANCY TEST: CPT | Performed by: ANESTHESIOLOGY

## 2019-12-03 PROCEDURE — C9290 INJ, BUPIVACAINE LIPOSOME: HCPCS | Performed by: SURGERY

## 2019-12-03 PROCEDURE — 0D164ZA BYPASS STOMACH TO JEJUNUM, PERCUTANEOUS ENDOSCOPIC APPROACH: ICD-10-PCS | Performed by: SURGERY

## 2019-12-03 PROCEDURE — 99254 IP/OBS CNSLTJ NEW/EST MOD 60: CPT | Performed by: INTERNAL MEDICINE

## 2019-12-03 DEVICE — SEAMGUARD STPL REINF INTUITIVE SUREFORM 60 GREEN: Type: IMPLANTABLE DEVICE | Site: ABDOMEN | Status: FUNCTIONAL

## 2019-12-03 RX ORDER — HYDRALAZINE HYDROCHLORIDE 20 MG/ML
10 INJECTION INTRAMUSCULAR; INTRAVENOUS ONCE
Status: COMPLETED | OUTPATIENT
Start: 2019-12-03 | End: 2019-12-03

## 2019-12-03 RX ORDER — CEFAZOLIN SODIUM 2 G/50ML
2000 SOLUTION INTRAVENOUS ONCE
Status: COMPLETED | OUTPATIENT
Start: 2019-12-03 | End: 2019-12-03

## 2019-12-03 RX ORDER — MAGNESIUM HYDROXIDE 1200 MG/15ML
LIQUID ORAL AS NEEDED
Status: DISCONTINUED | OUTPATIENT
Start: 2019-12-03 | End: 2019-12-03 | Stop reason: HOSPADM

## 2019-12-03 RX ORDER — HYDROMORPHONE HCL 110MG/55ML
PATIENT CONTROLLED ANALGESIA SYRINGE INTRAVENOUS AS NEEDED
Status: DISCONTINUED | OUTPATIENT
Start: 2019-12-03 | End: 2019-12-03 | Stop reason: SURG

## 2019-12-03 RX ORDER — METOPROLOL TARTRATE 5 MG/5ML
2.5 INJECTION INTRAVENOUS ONCE
Status: COMPLETED | OUTPATIENT
Start: 2019-12-03 | End: 2019-12-03

## 2019-12-03 RX ORDER — MIDAZOLAM HYDROCHLORIDE 2 MG/2ML
INJECTION, SOLUTION INTRAMUSCULAR; INTRAVENOUS AS NEEDED
Status: DISCONTINUED | OUTPATIENT
Start: 2019-12-03 | End: 2019-12-03 | Stop reason: SURG

## 2019-12-03 RX ORDER — CELECOXIB 200 MG/1
200 CAPSULE ORAL ONCE
Status: COMPLETED | OUTPATIENT
Start: 2019-12-03 | End: 2019-12-03

## 2019-12-03 RX ORDER — SODIUM CHLORIDE 9 MG/ML
125 INJECTION, SOLUTION INTRAVENOUS CONTINUOUS
Status: DISCONTINUED | OUTPATIENT
Start: 2019-12-03 | End: 2019-12-03

## 2019-12-03 RX ORDER — GABAPENTIN 300 MG/1
600 CAPSULE ORAL ONCE
Status: COMPLETED | OUTPATIENT
Start: 2019-12-03 | End: 2019-12-03

## 2019-12-03 RX ORDER — ACETAMINOPHEN 160 MG/5ML
975 SUSPENSION, ORAL (FINAL DOSE FORM) ORAL EVERY 8 HOURS
Status: DISCONTINUED | OUTPATIENT
Start: 2019-12-03 | End: 2019-12-03 | Stop reason: SDUPTHER

## 2019-12-03 RX ORDER — ONDANSETRON 2 MG/ML
4 INJECTION INTRAMUSCULAR; INTRAVENOUS EVERY 6 HOURS SCHEDULED
Status: DISCONTINUED | OUTPATIENT
Start: 2019-12-03 | End: 2019-12-04

## 2019-12-03 RX ORDER — ACETAMINOPHEN 325 MG/1
975 TABLET ORAL ONCE
Status: COMPLETED | OUTPATIENT
Start: 2019-12-03 | End: 2019-12-03

## 2019-12-03 RX ORDER — ENALAPRILAT 2.5 MG/2ML
1.25 INJECTION INTRAVENOUS ONCE
Status: COMPLETED | OUTPATIENT
Start: 2019-12-03 | End: 2019-12-03

## 2019-12-03 RX ORDER — GABAPENTIN 250 MG/5ML
300 SOLUTION ORAL 2 TIMES DAILY
Status: DISCONTINUED | OUTPATIENT
Start: 2019-12-03 | End: 2019-12-05 | Stop reason: HOSPADM

## 2019-12-03 RX ORDER — ACETAMINOPHEN 160 MG/5ML
975 SUSPENSION, ORAL (FINAL DOSE FORM) ORAL EVERY 8 HOURS
Status: DISCONTINUED | OUTPATIENT
Start: 2019-12-03 | End: 2019-12-05 | Stop reason: HOSPADM

## 2019-12-03 RX ORDER — SODIUM CHLORIDE, SODIUM LACTATE, POTASSIUM CHLORIDE, CALCIUM CHLORIDE 600; 310; 30; 20 MG/100ML; MG/100ML; MG/100ML; MG/100ML
75 INJECTION, SOLUTION INTRAVENOUS CONTINUOUS
Status: DISCONTINUED | OUTPATIENT
Start: 2019-12-03 | End: 2019-12-04

## 2019-12-03 RX ORDER — CEFAZOLIN SODIUM 2 G/50ML
2000 SOLUTION INTRAVENOUS EVERY 8 HOURS
Status: DISCONTINUED | OUTPATIENT
Start: 2019-12-03 | End: 2019-12-03 | Stop reason: SDUPTHER

## 2019-12-03 RX ORDER — SIMETHICONE 80 MG
80 TABLET,CHEWABLE ORAL 4 TIMES DAILY PRN
Status: DISCONTINUED | OUTPATIENT
Start: 2019-12-03 | End: 2019-12-05 | Stop reason: HOSPADM

## 2019-12-03 RX ORDER — DIPHENHYDRAMINE HCL 25 MG
25 TABLET ORAL
Status: DISCONTINUED | OUTPATIENT
Start: 2019-12-03 | End: 2019-12-03 | Stop reason: SDUPTHER

## 2019-12-03 RX ORDER — LOSARTAN POTASSIUM 50 MG/1
100 TABLET ORAL DAILY
Status: DISCONTINUED | OUTPATIENT
Start: 2019-12-03 | End: 2019-12-05

## 2019-12-03 RX ORDER — PROMETHAZINE HYDROCHLORIDE 25 MG/ML
25 INJECTION, SOLUTION INTRAMUSCULAR; INTRAVENOUS EVERY 6 HOURS PRN
Status: DISCONTINUED | OUTPATIENT
Start: 2019-12-03 | End: 2019-12-05 | Stop reason: HOSPADM

## 2019-12-03 RX ORDER — PROPOFOL 10 MG/ML
INJECTION, EMULSION INTRAVENOUS AS NEEDED
Status: DISCONTINUED | OUTPATIENT
Start: 2019-12-03 | End: 2019-12-03 | Stop reason: SURG

## 2019-12-03 RX ORDER — LORAZEPAM 2 MG/ML
0.5 INJECTION INTRAMUSCULAR ONCE
Status: COMPLETED | OUTPATIENT
Start: 2019-12-03 | End: 2019-12-03

## 2019-12-03 RX ORDER — MORPHINE SULFATE 4 MG/ML
4 INJECTION, SOLUTION INTRAMUSCULAR; INTRAVENOUS EVERY 4 HOURS PRN
Status: DISCONTINUED | OUTPATIENT
Start: 2019-12-03 | End: 2019-12-05 | Stop reason: HOSPADM

## 2019-12-03 RX ORDER — ONDANSETRON 2 MG/ML
4 INJECTION INTRAMUSCULAR; INTRAVENOUS EVERY 6 HOURS PRN
Status: DISCONTINUED | OUTPATIENT
Start: 2019-12-03 | End: 2019-12-04

## 2019-12-03 RX ORDER — LABETALOL 20 MG/4 ML (5 MG/ML) INTRAVENOUS SYRINGE
10 ONCE
Status: COMPLETED | OUTPATIENT
Start: 2019-12-03 | End: 2019-12-03

## 2019-12-03 RX ORDER — MEPERIDINE HYDROCHLORIDE 50 MG/ML
12.5 INJECTION INTRAMUSCULAR; INTRAVENOUS; SUBCUTANEOUS ONCE AS NEEDED
Status: DISCONTINUED | OUTPATIENT
Start: 2019-12-03 | End: 2019-12-03 | Stop reason: HOSPADM

## 2019-12-03 RX ORDER — OXYCODONE HCL 5 MG/5 ML
5 SOLUTION, ORAL ORAL EVERY 4 HOURS PRN
Status: DISCONTINUED | OUTPATIENT
Start: 2019-12-03 | End: 2019-12-05 | Stop reason: HOSPADM

## 2019-12-03 RX ORDER — CEFAZOLIN SODIUM 2 G/50ML
2000 SOLUTION INTRAVENOUS EVERY 8 HOURS
Status: COMPLETED | OUTPATIENT
Start: 2019-12-03 | End: 2019-12-04

## 2019-12-03 RX ORDER — HYDROMORPHONE HCL/PF 1 MG/ML
0.5 SYRINGE (ML) INJECTION
Status: COMPLETED | OUTPATIENT
Start: 2019-12-03 | End: 2019-12-03

## 2019-12-03 RX ORDER — SIMETHICONE 80 MG
80 TABLET,CHEWABLE ORAL 4 TIMES DAILY PRN
Status: DISCONTINUED | OUTPATIENT
Start: 2019-12-03 | End: 2019-12-03 | Stop reason: SDUPTHER

## 2019-12-03 RX ORDER — MORPHINE SULFATE 4 MG/ML
4 INJECTION, SOLUTION INTRAMUSCULAR; INTRAVENOUS EVERY 2 HOUR PRN
Status: DISCONTINUED | OUTPATIENT
Start: 2019-12-03 | End: 2019-12-03 | Stop reason: SDUPTHER

## 2019-12-03 RX ORDER — MORPHINE SULFATE 4 MG/ML
4 INJECTION, SOLUTION INTRAMUSCULAR; INTRAVENOUS EVERY 4 HOURS PRN
Status: DISCONTINUED | OUTPATIENT
Start: 2019-12-03 | End: 2019-12-03

## 2019-12-03 RX ORDER — DEXAMETHASONE SODIUM PHOSPHATE 4 MG/ML
INJECTION, SOLUTION INTRA-ARTICULAR; INTRALESIONAL; INTRAMUSCULAR; INTRAVENOUS; SOFT TISSUE AS NEEDED
Status: DISCONTINUED | OUTPATIENT
Start: 2019-12-03 | End: 2019-12-03 | Stop reason: SURG

## 2019-12-03 RX ORDER — AMLODIPINE BESYLATE 5 MG/1
5 TABLET ORAL DAILY
Status: DISCONTINUED | OUTPATIENT
Start: 2019-12-04 | End: 2019-12-03

## 2019-12-03 RX ORDER — SODIUM CHLORIDE, SODIUM LACTATE, POTASSIUM CHLORIDE, CALCIUM CHLORIDE 600; 310; 30; 20 MG/100ML; MG/100ML; MG/100ML; MG/100ML
100 INJECTION, SOLUTION INTRAVENOUS CONTINUOUS
Status: DISCONTINUED | OUTPATIENT
Start: 2019-12-03 | End: 2019-12-03 | Stop reason: SDUPTHER

## 2019-12-03 RX ORDER — HYDROMORPHONE HCL/PF 1 MG/ML
0.5 SYRINGE (ML) INJECTION ONCE
Status: COMPLETED | OUTPATIENT
Start: 2019-12-03 | End: 2019-12-03

## 2019-12-03 RX ORDER — GLYCOPYRROLATE 0.2 MG/ML
INJECTION INTRAMUSCULAR; INTRAVENOUS AS NEEDED
Status: DISCONTINUED | OUTPATIENT
Start: 2019-12-03 | End: 2019-12-03 | Stop reason: SURG

## 2019-12-03 RX ORDER — HYDRALAZINE HYDROCHLORIDE 20 MG/ML
10 INJECTION INTRAMUSCULAR; INTRAVENOUS EVERY 6 HOURS PRN
Status: DISCONTINUED | OUTPATIENT
Start: 2019-12-03 | End: 2019-12-05 | Stop reason: HOSPADM

## 2019-12-03 RX ORDER — SCOLOPAMINE TRANSDERMAL SYSTEM 1 MG/1
1 PATCH, EXTENDED RELEASE TRANSDERMAL ONCE
Status: DISCONTINUED | OUTPATIENT
Start: 2019-12-03 | End: 2019-12-04

## 2019-12-03 RX ORDER — NEOSTIGMINE METHYLSULFATE 1 MG/ML
INJECTION INTRAVENOUS AS NEEDED
Status: DISCONTINUED | OUTPATIENT
Start: 2019-12-03 | End: 2019-12-03 | Stop reason: SURG

## 2019-12-03 RX ORDER — ONDANSETRON 2 MG/ML
INJECTION INTRAMUSCULAR; INTRAVENOUS AS NEEDED
Status: DISCONTINUED | OUTPATIENT
Start: 2019-12-03 | End: 2019-12-03 | Stop reason: SURG

## 2019-12-03 RX ORDER — LABETALOL 20 MG/4 ML (5 MG/ML) INTRAVENOUS SYRINGE
AS NEEDED
Status: DISCONTINUED | OUTPATIENT
Start: 2019-12-03 | End: 2019-12-03 | Stop reason: SURG

## 2019-12-03 RX ORDER — BUPIVACAINE HYDROCHLORIDE 5 MG/ML
INJECTION, SOLUTION EPIDURAL; INTRACAUDAL AS NEEDED
Status: DISCONTINUED | OUTPATIENT
Start: 2019-12-03 | End: 2019-12-03 | Stop reason: HOSPADM

## 2019-12-03 RX ORDER — FENTANYL CITRATE/PF 50 MCG/ML
50 SYRINGE (ML) INJECTION
Status: DISCONTINUED | OUTPATIENT
Start: 2019-12-03 | End: 2019-12-03 | Stop reason: HOSPADM

## 2019-12-03 RX ORDER — ESCITALOPRAM OXALATE 10 MG/1
10 TABLET ORAL DAILY
Status: DISCONTINUED | OUTPATIENT
Start: 2019-12-03 | End: 2019-12-05 | Stop reason: HOSPADM

## 2019-12-03 RX ORDER — AMLODIPINE BESYLATE 5 MG/1
5 TABLET ORAL DAILY
Status: DISCONTINUED | OUTPATIENT
Start: 2019-12-03 | End: 2019-12-04

## 2019-12-03 RX ORDER — METOCLOPRAMIDE HYDROCHLORIDE 5 MG/ML
10 INJECTION INTRAMUSCULAR; INTRAVENOUS EVERY 6 HOURS PRN
Status: DISCONTINUED | OUTPATIENT
Start: 2019-12-03 | End: 2019-12-04

## 2019-12-03 RX ORDER — ACETAMINOPHEN 325 MG/1
975 TABLET ORAL EVERY 8 HOURS
Status: DISCONTINUED | OUTPATIENT
Start: 2019-12-03 | End: 2019-12-03 | Stop reason: SDUPTHER

## 2019-12-03 RX ORDER — ACETAMINOPHEN 325 MG/1
975 TABLET ORAL EVERY 8 HOURS
Status: DISCONTINUED | OUTPATIENT
Start: 2019-12-03 | End: 2019-12-05 | Stop reason: HOSPADM

## 2019-12-03 RX ORDER — HEPARIN SODIUM 5000 [USP'U]/ML
5000 INJECTION, SOLUTION INTRAVENOUS; SUBCUTANEOUS EVERY 8 HOURS SCHEDULED
Status: DISCONTINUED | OUTPATIENT
Start: 2019-12-03 | End: 2019-12-03 | Stop reason: HOSPADM

## 2019-12-03 RX ORDER — FENTANYL CITRATE 50 UG/ML
INJECTION, SOLUTION INTRAMUSCULAR; INTRAVENOUS AS NEEDED
Status: DISCONTINUED | OUTPATIENT
Start: 2019-12-03 | End: 2019-12-03 | Stop reason: SURG

## 2019-12-03 RX ORDER — SODIUM CHLORIDE 9 MG/ML
INJECTION, SOLUTION INTRAVENOUS AS NEEDED
Status: DISCONTINUED | OUTPATIENT
Start: 2019-12-03 | End: 2019-12-03 | Stop reason: HOSPADM

## 2019-12-03 RX ORDER — ONDANSETRON 2 MG/ML
4 INJECTION INTRAMUSCULAR; INTRAVENOUS ONCE AS NEEDED
Status: DISCONTINUED | OUTPATIENT
Start: 2019-12-03 | End: 2019-12-03 | Stop reason: HOSPADM

## 2019-12-03 RX ORDER — OXYCODONE HCL 5 MG/5 ML
10 SOLUTION, ORAL ORAL EVERY 4 HOURS PRN
Status: DISCONTINUED | OUTPATIENT
Start: 2019-12-03 | End: 2019-12-05 | Stop reason: HOSPADM

## 2019-12-03 RX ORDER — SCOLOPAMINE TRANSDERMAL SYSTEM 1 MG/1
1 PATCH, EXTENDED RELEASE TRANSDERMAL
Status: DISCONTINUED | OUTPATIENT
Start: 2019-12-03 | End: 2019-12-05 | Stop reason: HOSPADM

## 2019-12-03 RX ORDER — ROCURONIUM BROMIDE 10 MG/ML
INJECTION, SOLUTION INTRAVENOUS AS NEEDED
Status: DISCONTINUED | OUTPATIENT
Start: 2019-12-03 | End: 2019-12-03 | Stop reason: SURG

## 2019-12-03 RX ADMIN — NEOSTIGMINE METHYLSULFATE 4 MG: 1 INJECTION, SOLUTION INTRAVENOUS at 09:28

## 2019-12-03 RX ADMIN — HYDRALAZINE HYDROCHLORIDE 10 MG: 20 INJECTION INTRAMUSCULAR; INTRAVENOUS at 14:56

## 2019-12-03 RX ADMIN — ONDANSETRON 4 MG: 2 INJECTION INTRAMUSCULAR; INTRAVENOUS at 17:12

## 2019-12-03 RX ADMIN — ONDANSETRON 4 MG: 2 INJECTION INTRAMUSCULAR; INTRAVENOUS at 09:20

## 2019-12-03 RX ADMIN — METRONIDAZOLE 500 MG: 500 INJECTION, SOLUTION INTRAVENOUS at 17:45

## 2019-12-03 RX ADMIN — HYDROMORPHONE HYDROCHLORIDE 0.5 MG: 1 INJECTION, SOLUTION INTRAMUSCULAR; INTRAVENOUS; SUBCUTANEOUS at 14:28

## 2019-12-03 RX ADMIN — ACETAMINOPHEN 975 MG: 325 TABLET ORAL at 06:04

## 2019-12-03 RX ADMIN — SCOPALAMINE 1 PATCH: 1 PATCH, EXTENDED RELEASE TRANSDERMAL at 05:59

## 2019-12-03 RX ADMIN — ROCURONIUM BROMIDE 10 MG: 50 INJECTION, SOLUTION INTRAVENOUS at 08:35

## 2019-12-03 RX ADMIN — SODIUM CHLORIDE 5 MG/HR: 0.9 INJECTION, SOLUTION INTRAVENOUS at 18:20

## 2019-12-03 RX ADMIN — ROCURONIUM BROMIDE 50 MG: 50 INJECTION, SOLUTION INTRAVENOUS at 07:30

## 2019-12-03 RX ADMIN — HYDROMORPHONE HYDROCHLORIDE 0.5 MG: 1 INJECTION, SOLUTION INTRAMUSCULAR; INTRAVENOUS; SUBCUTANEOUS at 10:54

## 2019-12-03 RX ADMIN — ONDANSETRON 4 MG: 2 INJECTION INTRAMUSCULAR; INTRAVENOUS at 17:25

## 2019-12-03 RX ADMIN — LABETALOL 20 MG/4 ML (5 MG/ML) INTRAVENOUS SYRINGE 2.5 MG: at 08:46

## 2019-12-03 RX ADMIN — MIDAZOLAM 2 MG: 1 INJECTION INTRAMUSCULAR; INTRAVENOUS at 07:20

## 2019-12-03 RX ADMIN — FENTANYL CITRATE 50 MCG: 50 INJECTION, SOLUTION INTRAMUSCULAR; INTRAVENOUS at 07:53

## 2019-12-03 RX ADMIN — LORAZEPAM 0.5 MG: 2 INJECTION INTRAMUSCULAR; INTRAVENOUS at 14:00

## 2019-12-03 RX ADMIN — HYDROMORPHONE HYDROCHLORIDE 0.5 MG: 1 INJECTION, SOLUTION INTRAMUSCULAR; INTRAVENOUS; SUBCUTANEOUS at 10:26

## 2019-12-03 RX ADMIN — OXYCODONE HYDROCHLORIDE 10 MG: 5 SOLUTION ORAL at 19:40

## 2019-12-03 RX ADMIN — LABETALOL 20 MG/4 ML (5 MG/ML) INTRAVENOUS SYRINGE 2.5 MG: at 08:11

## 2019-12-03 RX ADMIN — MORPHINE SULFATE 2 MG: 2 INJECTION, SOLUTION INTRAMUSCULAR; INTRAVENOUS at 21:19

## 2019-12-03 RX ADMIN — ACETAMINOPHEN 975 MG: 650 SUSPENSION ORAL at 17:47

## 2019-12-03 RX ADMIN — SODIUM CHLORIDE 10 MG/HR: 0.9 INJECTION, SOLUTION INTRAVENOUS at 21:25

## 2019-12-03 RX ADMIN — FENTANYL CITRATE 50 MCG: 50 INJECTION, SOLUTION INTRAMUSCULAR; INTRAVENOUS at 09:51

## 2019-12-03 RX ADMIN — ENALAPRILAT 1.25 MG: 1.25 INJECTION INTRAVENOUS at 14:45

## 2019-12-03 RX ADMIN — ENALAPRILAT 1.25 MG: 1.25 INJECTION INTRAVENOUS at 14:23

## 2019-12-03 RX ADMIN — MORPHINE SULFATE 4 MG: 4 INJECTION INTRAVENOUS at 17:12

## 2019-12-03 RX ADMIN — HYDROMORPHONE HYDROCHLORIDE 0.5 MG: 1 INJECTION, SOLUTION INTRAMUSCULAR; INTRAVENOUS; SUBCUTANEOUS at 10:15

## 2019-12-03 RX ADMIN — GABAPENTIN 600 MG: 300 CAPSULE ORAL at 06:04

## 2019-12-03 RX ADMIN — DEXAMETHASONE SODIUM PHOSPHATE 4 MG: 4 INJECTION, SOLUTION INTRAMUSCULAR; INTRAVENOUS at 07:34

## 2019-12-03 RX ADMIN — GLYCOPYRROLATE 0.6 MG: 0.2 INJECTION INTRAMUSCULAR; INTRAVENOUS at 09:28

## 2019-12-03 RX ADMIN — FAMOTIDINE 20 MG: 10 INJECTION, SOLUTION INTRAVENOUS at 17:26

## 2019-12-03 RX ADMIN — METRONIDAZOLE 500 MG: 500 INJECTION, SOLUTION INTRAVENOUS at 07:26

## 2019-12-03 RX ADMIN — HEPARIN SODIUM 5000 UNITS: 5000 INJECTION INTRAVENOUS; SUBCUTANEOUS at 06:41

## 2019-12-03 RX ADMIN — GABAPENTIN 300 MG: 250 SOLUTION ORAL at 19:29

## 2019-12-03 RX ADMIN — HYDROMORPHONE HYDROCHLORIDE 0.5 MG: 1 INJECTION, SOLUTION INTRAMUSCULAR; INTRAVENOUS; SUBCUTANEOUS at 10:05

## 2019-12-03 RX ADMIN — PROPOFOL 200 MG: 10 INJECTION, EMULSION INTRAVENOUS at 07:30

## 2019-12-03 RX ADMIN — HYDRALAZINE HYDROCHLORIDE 10 MG: 20 INJECTION INTRAMUSCULAR; INTRAVENOUS at 10:35

## 2019-12-03 RX ADMIN — AMLODIPINE BESYLATE 5 MG: 5 TABLET ORAL at 17:47

## 2019-12-03 RX ADMIN — SODIUM CHLORIDE: 0.9 INJECTION, SOLUTION INTRAVENOUS at 09:29

## 2019-12-03 RX ADMIN — LIDOCAINE HYDROCHLORIDE 50 MG: 20 INJECTION, SOLUTION INTRAVENOUS at 07:30

## 2019-12-03 RX ADMIN — CEFAZOLIN SODIUM 2000 MG: 2 SOLUTION INTRAVENOUS at 17:49

## 2019-12-03 RX ADMIN — HYDRALAZINE HYDROCHLORIDE 10 MG: 20 INJECTION INTRAMUSCULAR; INTRAVENOUS at 19:16

## 2019-12-03 RX ADMIN — SODIUM CHLORIDE, SODIUM LACTATE, POTASSIUM CHLORIDE, AND CALCIUM CHLORIDE 75 ML/HR: .6; .31; .03; .02 INJECTION, SOLUTION INTRAVENOUS at 11:40

## 2019-12-03 RX ADMIN — FENTANYL CITRATE 50 MCG: 50 INJECTION, SOLUTION INTRAMUSCULAR; INTRAVENOUS at 08:41

## 2019-12-03 RX ADMIN — LABETALOL 20 MG/4 ML (5 MG/ML) INTRAVENOUS SYRINGE 10 MG: at 11:57

## 2019-12-03 RX ADMIN — HYDROMORPHONE HYDROCHLORIDE 0.5 MG: 2 INJECTION, SOLUTION INTRAMUSCULAR; INTRAVENOUS; SUBCUTANEOUS at 08:10

## 2019-12-03 RX ADMIN — ROCURONIUM BROMIDE 10 MG: 50 INJECTION, SOLUTION INTRAVENOUS at 08:12

## 2019-12-03 RX ADMIN — LABETALOL 20 MG/4 ML (5 MG/ML) INTRAVENOUS SYRINGE 10 MG: at 11:25

## 2019-12-03 RX ADMIN — LOSARTAN POTASSIUM 100 MG: 50 TABLET, FILM COATED ORAL at 17:47

## 2019-12-03 RX ADMIN — METOPROLOL TARTRATE 2.5 MG: 5 INJECTION, SOLUTION INTRAVENOUS at 12:41

## 2019-12-03 RX ADMIN — FENTANYL CITRATE 50 MCG: 50 INJECTION, SOLUTION INTRAMUSCULAR; INTRAVENOUS at 09:59

## 2019-12-03 RX ADMIN — CEFAZOLIN SODIUM 2000 MG: 2 SOLUTION INTRAVENOUS at 07:19

## 2019-12-03 RX ADMIN — SODIUM CHLORIDE, SODIUM LACTATE, POTASSIUM CHLORIDE, AND CALCIUM CHLORIDE 75 ML/HR: .6; .31; .03; .02 INJECTION, SOLUTION INTRAVENOUS at 21:52

## 2019-12-03 RX ADMIN — SODIUM CHLORIDE 125 ML/HR: 0.9 INJECTION, SOLUTION INTRAVENOUS at 06:19

## 2019-12-03 RX ADMIN — CELECOXIB 200 MG: 200 CAPSULE ORAL at 06:04

## 2019-12-03 RX ADMIN — SODIUM CHLORIDE: 0.9 INJECTION, SOLUTION INTRAVENOUS at 08:11

## 2019-12-03 RX ADMIN — FENTANYL CITRATE 100 MCG: 50 INJECTION, SOLUTION INTRAMUSCULAR; INTRAVENOUS at 07:30

## 2019-12-03 NOTE — ANESTHESIA POSTPROCEDURE EVALUATION
Post-Op Assessment Note    CV Status:  Unstable    Pain management: adequate     Mental Status:  Alert and awake   Hydration Status:  Euvolemic   PONV Controlled:  Controlled   Airway Patency:  Patent  Airway: intubated   Post Op Vitals Reviewed: Yes      Staff: Anesthesiologist   Comments: Patient with uncontrolled hypertension post operatively despite treatment with multiple medications  Discussec with critical care and Dr Devaughn Gottron    patient to be admitted to ICU step down phase 1 for BP management          BP (!) 175/93 (12/03/19 1632)    Temp      Pulse 104 (12/03/19 1632)   Resp 18 (12/03/19 1632)    SpO2 96 % (12/03/19 1632)

## 2019-12-03 NOTE — OP NOTE
OPERATIVE REPORT  PATIENT NAME: Sylvia Boogie    :  1990  MRN: [id number]  Pt Location: [facility] OR ROOM 06    SURGERY DATE: 12/3/2019    Surgeon(s) and Role:     * Humza Galaviz MD - Primary     * Ale Quintana PA-C - Assisting     * Susi Torres MD - Assisting    Preop Diagnosis:  Lifelong obesity [E66 9]  EAMON (obstructive sleep apnea) [G47 33]  Benign essential hypertension [I10]    Post-Op Diagnosis Codes:     * Lifelong obesity Vítor Luis Angel  9]Body mass index is 37 77 kg/m²  * EAMON (obstructive sleep apnea) [G47 33]     * Benign essential hypertension [I10]    Procedure(s) (LRB):  ROBOTIC BYPASS GASTRIC MILTON-EN-Y, intraop egd (N/A)    Specimen(s):  * No specimens in log *    Estimated Blood Loss:   20 ml    Drains:  [REMOVED] NG/OG/Enteral Tube Orogastric Center mouth (Removed)   Number of days: 0       Anesthesia Type:   General    Operative Indications:  Lifelong obesity [E66 9]  EAMON (obstructive sleep apnea) [G47 33]  Benign essential hypertension [I10]      Operative Findings:  Normal findings    Complications:   None    Procedure and Technique:  Robotic Milton-en-Y gastric bypass with intraop endoscopy   I was present for the entire procedure    Patient Disposition:  hemodynamically stable     No qualified resident was available  Assistant was necessary for instrument exchange and counter traction and assistance with stapling and intraop endoscopy    Indication:   Patient suffers from morbid obesity and associated co-morbidities  and failed to achieve any meaningful or sustainable weight loss and was therefore consented to undergo a laparoscopic Milton en Y Gastric Bypass  Risks and benefits were explained to the patient, patient consented for the procedure  The patient was brought to the operating room and placed in a supine position  The patient received a dose of IV antibiotics and a dose of subcutaneous Heparin prior to the procedure  The patient was induced under general endotracheal anesthesia  The abdominal wall was prepped and draped under sterile conditions in the usual fashion  The procedure was started by obtaining access to the abdominal cavity using a Veress needle to the left side of the midline around 6 inches from the xiphoid the abdominal cavity was insufflated with CO2 to a pressure of 15 mmHg  After that, the abdomen was entered with a 12 mm trocar using an Optiview trocar under direct visualization  At that point, an 8-mm trocar and a 12-mm trocar were placed on the right side of the abdominal wall, under direct visualization, and two 8-mm trocars were placed on the left side of the abdominal wall, also under direct visualization  A TAP block was performed using 20 ml of Exparel mixed with saline and 0 5 % Marcaine for a total of 100 ml  The patient was then placed in a reverse Trendelenburg position  A Megan retractor was placed through a small stab incision below the xiphoid and was used to retract the left lobe of the liver in a medial fashion, the robot was then docked and locked in place  An OG tube was placed to decompress the stomach and then removed immediately  The procedure was started by lifting the omentum in a cephalad fashion  The omentum was then split in half using the vessel sealer  The ligament of Treitz was identified and then the small bowel was transected with a 60 mm stapler using a white load  The mesentery of the small bowel was then transected using the vessel sealer,   After that, the distal small bowel was run for 150 cm in a way to obtain a 150 cm long Dale limb  At that point, two enterotomies were made in the BP limb and the Dale limb with hot scissors l, and the jejunojejunostomy was fashioned using a 60 mm stapler with a white load  After firing the stapler, the staple line was inspected and there was no evidence of bleeding and the staple line was well formed   The common enterotomy of the jejunojejunostomy was closed in two layers using 2-0 Vicryl running suture for the first layer and  2-0 V LOC in a running fashion for the second layer  The mesenteric defect of the small bowel was approximated using nonabsorbable suture in a running fashion  At that point, we turned our attention to the upper portion of the abdomen  The pars flaccida was opened and a gastric pouch was created with serial firings of the Sureform 60 mm intuitive  stapler with a green cartridge reinforced with Seamguard  After the formation of the gastric pouch, the staple lines of the pouch and the gastric remnant were inspected and appeared to be well formed and also appeared to be hemostatic  A new gastrojejunostomy anastomosis was then fashioned in an antecolic antegastric fashion in 2 layers  Outer layer was a running layer of 2-0 V lock suture and the inner  layer was a running 2-0 Vicryl suture  The Brisa Bacilio defect was closed with a simple nonabsorbable 2 0 Ethibond suture in a figure-of-eight  Fashion  Upper endoscopy was then performed and showed no evidence of bubbles or bleeding at the suture line of the anastomosis or the staple line of the gastric pouch  The gastrojejunostomy was covered with an omental flap that was secured in place with an absorbable suture in a simple fashion  The [facility] liver retractor was removed  The 12 mm port was closed  with 1-0 Vicryl in a simple fashion  All the skin edges of the trocar sites were approximated with 4-0 Monocryl in a subcuticular inverted fashion  The patient was extubated and transferred to the PACU in stable condition      SIGNATURE: Janine Love MD  DATE: December 3, 2019  TIME: 9:24 AM

## 2019-12-03 NOTE — INTERVAL H&P NOTE
H&P reviewed  Patient wants to undergo a gastric bypass and not a sleeve since our last meeting  After examining the patient I find no changes in the patients condition since the H&P had been written      Vitals:    12/03/19 0542   BP: 162/90   Pulse: 73   Resp: 18   Temp: 98 7 °F (37 1 °C)   SpO2: 96%

## 2019-12-03 NOTE — ASSESSMENT & PLAN NOTE
· Status post gastric by pass surgery POD #0  · Post op in PACU had hypertension that was uncontrolled despite given 2 doses of Vasotec, Labetalol, and Hydralazine  · Bariatric surgery following as primary  · Out of bed as tolerated

## 2019-12-03 NOTE — ASSESSMENT & PLAN NOTE
· On Norvasc and Losartan at home prior to surgery   · Post op had hypertension that was not improved with IV blood pressure medications  · Goal SBP <160  · Will attempt PRN Hydralazine and Labetalol if no improvement will place on Cardene infusion  · May be related to pain or anesthesia   Will follow closely and restart on home blood pressure medication

## 2019-12-03 NOTE — CONSULTS
Consult- Stacie Oleary 1990, 30 y o  female MRN: [id number]    Unit/Bed#: ICU 14 Encounter: [id number]    Primary Care Provider: Cali Esparza MD   Date and time admitted to hospital: 12/3/2019  5:13 AM      Inpatient consult to Hansel Rodriguez Jairo performed by: NANCI Valera  Consult ordered by: Beverley Newsome MD          * Status post Dustin-en-y surgery   Assessment & Plan  · Status post gastric by pass surgery POD #0  · Post op in PACU had hypertension that was uncontrolled despite given 2 doses of Vasotec, Labetalol, and Hydralazine  · Bariatric surgery following as primary  · Out of bed as tolerated    Hypertensive urgency  Assessment & Plan  · On Norvasc and Losartan at home prior to surgery   · Post op had hypertension that was not improved with IV blood pressure medications  · Goal SBP <160  · Will attempt PRN Hydralazine and Labetalol if no improvement will place on Cardene infusion  · May be related to pain or anesthesia  Will follow closely and restart on home blood pressure medication    EAMON (obstructive sleep apnea)  Assessment & Plan  · Wears CPAP at home  · Will place on CPAP here once stable from bariatric service to start        -------------------------------------------------------------------------------------------------------------  Chief Complaint: s/p gastric bypass with hypertension    History of Present Illness     Stacie Oleary is a 30 y o  female who presents with a past medical history significant for hypertension, EAMON, and kidney stones  She presented to the hospital for an elective gastric bypass dustin-en-y surgery  Post op had hypertension in the 200's which required Hydralazine x 2 doses, Labetalol, and Vasotec  She had no improvement of blood pressure  Due to this was admitted to stepdown unit due to need for Cardene infusion  She did not today she took 1/2 tablet of her Norvasc and has not taken Losartan in 2 days    Despite that she has been compliant with her blood pressure medications  She has no complaints at this time except for a dry mouth and mild abdominal discomfort  Denies chest pain or shortness or breath, denies blurred vision  History obtained from the patient   -------------------------------------------------------------------------------------------------------------  Dispo: Continue Stepdown Level 1 level of care     Code Status: No Order  --------------------------------------------------------------------------------------------------------------  Review of Systems   Constitutional:        Dry mouth     HENT: Negative  Eyes: Negative  Respiratory: Negative for shortness of breath  Cardiovascular: Negative  Negative for chest pain  Gastrointestinal: Negative for diarrhea, nausea and vomiting  Mild abdominal discomfort     Endocrine: Negative  Genitourinary: Negative  Negative for urgency  Musculoskeletal: Negative  Allergic/Immunologic: Negative  Neurological: Negative  Negative for dizziness, weakness and headaches  Hematological: Negative  Psychiatric/Behavioral: Negative  All other systems reviewed and are negative  A 12-point, complete review of systems was reviewed and negative except as stated above     Physical Exam   Constitutional: She is oriented to person, place, and time  She appears well-developed and well-nourished  No distress  HENT:   Head: Normocephalic and atraumatic  Mouth/Throat: Oropharynx is clear and moist  No oropharyngeal exudate  Eyes: Pupils are equal, round, and reactive to light  Conjunctivae and EOM are normal    Neck: Normal range of motion  Neck supple  No JVD present  Cardiovascular: Normal rate, regular rhythm and normal heart sounds  Exam reveals no gallop and no friction rub  No murmur heard  Pulmonary/Chest: Effort normal and breath sounds normal  No respiratory distress  Abdominal: Soft   Bowel sounds are normal    Surgical sites dry and intact     Musculoskeletal: Normal range of motion  She exhibits no edema  Lymphadenopathy:     She has no cervical adenopathy  Neurological: She is alert and oriented to person, place, and time  No cranial nerve deficit  Skin: Skin is warm and dry  Capillary refill takes less than 2 seconds  No erythema  Psychiatric: She has a normal mood and affect  Vitals reviewed      --------------------------------------------------------------------------------------------------------------  Historical Information   Past Medical History:   Diagnosis Date    Arthritis     Depression     Heartburn     Hypertension     Kidney stone     Obesity     Seasonal allergies     Sleep apnea     No cpap    Wears glasses      Past Surgical History:   Procedure Laterality Date    EGD       Social History   Social History     Substance and Sexual Activity   Alcohol Use Yes    Frequency: 2-4 times a month    Drinks per session: 3 or 4    Binge frequency: Never    Comment: social     Social History     Substance and Sexual Activity   Drug Use No     Social History     Tobacco Use   Smoking Status Former Smoker    Packs/day: 0 50    Years: 10 00    Pack years: 5 00    Types: Cigarettes    Last attempt to quit: 10/4/2019    Years since quittin 1   Smokeless Tobacco Never Used       Family History:   Family History   Problem Relation Age of Onset    Hypertension Family     Hypertension Mother     Alcohol abuse Father     Diabetes Father     No Known Problems Brother     Heart disease Neg Hx     Thyroid disease Neg Hx     Stroke Neg Hx      I have reviewed this patient's family history and commented on sigificant items within the HPI    Vitals:   Vitals:    19 1602 19 1617 19 1632 19 1707   BP: (!) 185/106 (!) 186/101 (!) 175/93 (!) 184/103   BP Location:    Right arm   Pulse: 100 102 104 100   Resp: 16 16 18 (!) 23   Temp:  98 2 °F (36 8 °C)  98 3 °F (36 8 °C)   TempSrc:    Temporal SpO2: 94% 95% 96% 97%   Weight:    118 kg (259 lb 11 2 oz)   Height:    5' 9" (1 753 m)     Temp  Min: 97 8 °F (36 6 °C)  Max: 98 8 °F (37 1 °C)  IBW: 66 2 kg  Height: 5' 9" (175 3 cm)  Body mass index is 38 35 kg/m²        Medications:  Current Facility-Administered Medications   Medication Dose Route Frequency    acetaminophen (TYLENOL) tablet 975 mg  975 mg Oral Q8H    Or    acetaminophen (TYLENOL) oral suspension 975 mg  975 mg Oral Q8H    ceFAZolin (ANCEF) IVPB (premix) 2,000 mg  2,000 mg Intravenous Q8H    escitalopram (LEXAPRO) tablet 10 mg  10 mg Oral Daily    famotidine (PEPCID) injection 20 mg  20 mg Intravenous BID    gabapentin (NEURONTIN) oral solution 300 mg  300 mg Oral BID    lactated ringers infusion  75 mL/hr Intravenous Continuous    lactated ringers infusion  100 mL/hr Intravenous Continuous    metoclopramide (REGLAN) injection 10 mg  10 mg Intravenous Q6H PRN    metroNIDAZOLE (FLAGYL) IVPB (premix) 500 mg  500 mg Intravenous Q8H    morphine (PF) 4 mg/mL injection 4 mg  4 mg Intravenous Q4H PRN    morphine injection 2 mg  2 mg Intravenous Q4H PRN    ondansetron (ZOFRAN) injection 4 mg  4 mg Intravenous Q6H PRN    ondansetron (ZOFRAN) injection 4 mg  4 mg Intravenous Q6H GARIMA    oxyCODONE (ROXICODONE) oral solution 10 mg  10 mg Oral Q4H PRN    oxyCODONE (ROXICODONE) oral solution 5 mg  5 mg Oral Q4H PRN    phenol (CHLORASEPTIC) 1 4 % mucosal liquid 2 spray  2 spray Mouth/Throat Q2H PRN    phenol (CHLORASEPTIC) 1 4 % mucosal liquid 2 spray  2 spray Mouth/Throat Q2H PRN    promethazine (PHENERGAN) injection 25 mg  25 mg Intravenous Q6H PRN    scopolamine (TRANSDERM-SCOP) 1 5 mg/3 days TD 72 hr patch 1 patch  1 patch Transdermal Once    scopolamine (TRANSDERM-SCOP) 1 5 mg/3 days TD 72 hr patch 1 patch  1 patch Transdermal Q72H    simethicone (MYLICON) chewable tablet 80 mg  80 mg Oral 4x Daily PRN    sodium chloride 0 9 % infusion  125 mL/hr Intravenous Continuous     Home medications:  Prior to Admission Medications   Prescriptions Last Dose Informant Patient Reported?  Taking?   acetaminophen (TYLENOL) 325 mg tablet 2019  Yes Yes   Sig: Take 650 mg by mouth every 6 (six) hours as needed for mild pain   amLODIPine (NORVASC) 5 mg tablet 12/3/2019 at 0450  No Yes   Sig: Take 1 tablet (5 mg total) by mouth daily   Patient taking differently: Take 5 mg by mouth as needed (Checks pressure to see if needed) Took 2 5 today   escitalopram (LEXAPRO) 10 mg tablet More than a month at Unknown time Self No No   Sig: Take 1 tablet (10 mg total) by mouth daily   Patient taking differently: Take 10 mg by mouth as needed    fluticasone (FLONASE) 50 mcg/act nasal spray 12/3/2019 at 0535 Self No Yes   Si sprays into each nostril daily   losartan (COZAAR) 100 MG tablet 2019  No Yes   Sig: Take 1 tablet (100 mg total) by mouth daily   mometasone (NASONEX) 50 mcg/act nasal spray More than a month at Unknown time  Yes No   Si sprays into each nostril as needed    omeprazole (PriLOSEC) 20 mg delayed release capsule   No No   Sig: Take 1 capsule (20 mg total) by mouth daily   oxyCODONE (ROXICODONE) 5 mg immediate release tablet   No No   Sig: Take 1 tablet (5 mg total) by mouth every 4 (four) hours as needed for moderate pain For continuing therapyMax Daily Amount: 30 mg      Facility-Administered Medications: None     Allergies:  No Known Allergies      Laboratory and Diagnostics:        Invalid input(s):  EOSPCT                        ABG:    VBG:            Micro:          EKG: EKG: NSR  Imaging:    ------------------------------------------------------------------------------------------------------------  Advance Directive and Living Will:      Power of :    POLST:    ------------------------------------------------------------------------------------------------------------      Claudell Payment, CRNP        Portions of the record may have been created with voice recognition software  Occasional wrong word or "sound a like" substitutions may have occurred due to the inherent limitations of voice recognition software    Read the chart carefully and recognize, using context, where substitutions have occurred

## 2019-12-03 NOTE — ANESTHESIA PREPROCEDURE EVALUATION
Review of Systems/Medical History  Patient summary reviewed  Chart reviewed  No history of anesthetic complications     Cardiovascular  Hypertension on > 1 medication,    Pulmonary  Smoker (quit 10/4/19) ex-smoker  Cumulative Pack Years: 23, Sleep apnea (SNORING) ,        GI/Hepatic  Negative GI/hepatic ROS          Kidney stones,        Endo/Other    Obesity (BMI=37)    GYN  Negative gynecology ROS          Hematology  Negative hematology ROS      Musculoskeletal    Arthritis     Neurology      Comment: [facility]   Depression , being treated for depression,              Physical Exam    Airway    Mallampati score: II  TM Distance: >3 FB  Neck ROM: full     Dental   No notable dental hx     Cardiovascular  Rhythm: regular, Rate: normal, Cardiovascular exam normal    Pulmonary  Pulmonary exam normal Breath sounds clear to auscultation,     Other Findings        Anesthesia Plan  ASA Score- 3     Anesthesia Type- general with ASA Monitors  Additional Monitors:   Airway Plan: ETT  Plan Factors-Patient not instructed to abstain from smoking on day of procedure  Patient smoked on day of surgery  Induction- intravenous  Postoperative Plan-     Informed Consent- Anesthetic plan and risks discussed with patient

## 2019-12-04 PROBLEM — I16.0 HYPERTENSIVE URGENCY: Status: RESOLVED | Noted: 2019-12-03 | Resolved: 2019-12-04

## 2019-12-04 LAB
ANION GAP SERPL CALCULATED.3IONS-SCNC: 9 MMOL/L (ref 4–13)
BUN SERPL-MCNC: 10 MG/DL (ref 5–25)
CALCIUM SERPL-MCNC: 8.2 MG/DL (ref 8.3–10.1)
CHLORIDE SERPL-SCNC: 103 MMOL/L (ref 100–108)
CO2 SERPL-SCNC: 25 MMOL/L (ref 21–32)
CREAT SERPL-MCNC: 0.56 MG/DL (ref 0.6–1.3)
ERYTHROCYTE [DISTWIDTH] IN BLOOD BY AUTOMATED COUNT: 13.1 % (ref 11.6–15.1)
GFR SERPL CREATININE-BSD FRML MDRD: 125 ML/MIN/1.73SQ M
GLUCOSE SERPL-MCNC: 115 MG/DL (ref 65–140)
HCT VFR BLD AUTO: 41.4 % (ref 34.8–46.1)
HGB BLD-MCNC: 13.5 G/DL (ref 11.5–15.4)
MCH RBC QN AUTO: 30.3 PG (ref 26.8–34.3)
MCHC RBC AUTO-ENTMCNC: 32.6 G/DL (ref 31.4–37.4)
MCV RBC AUTO: 93 FL (ref 82–98)
PLATELET # BLD AUTO: 218 THOUSANDS/UL (ref 149–390)
PMV BLD AUTO: 9.8 FL (ref 8.9–12.7)
POTASSIUM SERPL-SCNC: 3.4 MMOL/L (ref 3.5–5.3)
RBC # BLD AUTO: 4.45 MILLION/UL (ref 3.81–5.12)
SODIUM SERPL-SCNC: 137 MMOL/L (ref 136–145)
WBC # BLD AUTO: 14.08 THOUSAND/UL (ref 4.31–10.16)

## 2019-12-04 PROCEDURE — 85027 COMPLETE CBC AUTOMATED: CPT | Performed by: SURGERY

## 2019-12-04 PROCEDURE — 99024 POSTOP FOLLOW-UP VISIT: CPT | Performed by: SURGERY

## 2019-12-04 PROCEDURE — 80048 BASIC METABOLIC PNL TOTAL CA: CPT | Performed by: SURGERY

## 2019-12-04 PROCEDURE — 99232 SBSQ HOSP IP/OBS MODERATE 35: CPT | Performed by: INTERNAL MEDICINE

## 2019-12-04 RX ORDER — POTASSIUM CHLORIDE 14.9 MG/ML
20 INJECTION INTRAVENOUS ONCE
Status: COMPLETED | OUTPATIENT
Start: 2019-12-04 | End: 2019-12-04

## 2019-12-04 RX ORDER — POTASSIUM CHLORIDE 20MEQ/15ML
40 LIQUID (ML) ORAL ONCE
Status: DISCONTINUED | OUTPATIENT
Start: 2019-12-04 | End: 2019-12-05 | Stop reason: HOSPADM

## 2019-12-04 RX ORDER — AMLODIPINE BESYLATE 5 MG/1
5 TABLET ORAL DAILY
Qty: 15 TABLET | Refills: 0 | Status: SHIPPED | OUTPATIENT
Start: 2019-12-04 | End: 2020-01-08 | Stop reason: ALTCHOICE

## 2019-12-04 RX ORDER — LOSARTAN POTASSIUM 100 MG/1
100 TABLET ORAL DAILY
Qty: 15 TABLET | Refills: 0 | Status: SHIPPED | OUTPATIENT
Start: 2019-12-04 | End: 2019-12-05 | Stop reason: HOSPADM

## 2019-12-04 RX ORDER — PANTOPRAZOLE SODIUM 40 MG/1
40 TABLET, DELAYED RELEASE ORAL DAILY
Qty: 30 TABLET | Refills: 6 | Status: SHIPPED | OUTPATIENT
Start: 2019-12-04 | End: 2020-01-08 | Stop reason: SDUPTHER

## 2019-12-04 RX ORDER — AMLODIPINE BESYLATE 10 MG/1
10 TABLET ORAL DAILY
Status: DISCONTINUED | OUTPATIENT
Start: 2019-12-05 | End: 2019-12-05

## 2019-12-04 RX ORDER — AMLODIPINE BESYLATE 5 MG/1
5 TABLET ORAL DAILY
Status: COMPLETED | OUTPATIENT
Start: 2019-12-04 | End: 2019-12-04

## 2019-12-04 RX ADMIN — OXYCODONE HYDROCHLORIDE 10 MG: 5 SOLUTION ORAL at 08:19

## 2019-12-04 RX ADMIN — ACETAMINOPHEN 975 MG: 325 TABLET ORAL at 16:45

## 2019-12-04 RX ADMIN — SIMETHICONE CHEW TAB 80 MG 80 MG: 80 TABLET ORAL at 02:06

## 2019-12-04 RX ADMIN — AMLODIPINE BESYLATE 5 MG: 5 TABLET ORAL at 16:08

## 2019-12-04 RX ADMIN — ACETAMINOPHEN 975 MG: 650 SUSPENSION ORAL at 01:35

## 2019-12-04 RX ADMIN — ACETAMINOPHEN 975 MG: 325 TABLET ORAL at 08:36

## 2019-12-04 RX ADMIN — ONDANSETRON 4 MG: 2 INJECTION INTRAMUSCULAR; INTRAVENOUS at 11:37

## 2019-12-04 RX ADMIN — ESCITALOPRAM OXALATE 10 MG: 10 TABLET ORAL at 08:37

## 2019-12-04 RX ADMIN — HYDRALAZINE HYDROCHLORIDE 10 MG: 20 INJECTION INTRAMUSCULAR; INTRAVENOUS at 14:35

## 2019-12-04 RX ADMIN — SODIUM CHLORIDE 7.5 MG/HR: 0.9 INJECTION, SOLUTION INTRAVENOUS at 00:51

## 2019-12-04 RX ADMIN — POTASSIUM CHLORIDE 20 MEQ: 14.9 INJECTION, SOLUTION INTRAVENOUS at 09:46

## 2019-12-04 RX ADMIN — LOSARTAN POTASSIUM 100 MG: 50 TABLET, FILM COATED ORAL at 08:37

## 2019-12-04 RX ADMIN — OXYCODONE HYDROCHLORIDE 10 MG: 5 SOLUTION ORAL at 00:23

## 2019-12-04 RX ADMIN — OXYCODONE HYDROCHLORIDE 10 MG: 5 SOLUTION ORAL at 15:58

## 2019-12-04 RX ADMIN — METRONIDAZOLE 500 MG: 500 INJECTION, SOLUTION INTRAVENOUS at 00:17

## 2019-12-04 RX ADMIN — GABAPENTIN 300 MG: 250 SOLUTION ORAL at 08:43

## 2019-12-04 RX ADMIN — OXYCODONE HYDROCHLORIDE 10 MG: 5 SOLUTION ORAL at 04:34

## 2019-12-04 RX ADMIN — ONDANSETRON 4 MG: 2 INJECTION INTRAMUSCULAR; INTRAVENOUS at 00:20

## 2019-12-04 RX ADMIN — GABAPENTIN 300 MG: 250 SOLUTION ORAL at 18:03

## 2019-12-04 RX ADMIN — HYDRALAZINE HYDROCHLORIDE 10 MG: 20 INJECTION INTRAMUSCULAR; INTRAVENOUS at 07:28

## 2019-12-04 RX ADMIN — OXYCODONE HYDROCHLORIDE 10 MG: 5 SOLUTION ORAL at 20:00

## 2019-12-04 RX ADMIN — OXYCODONE HYDROCHLORIDE 10 MG: 5 SOLUTION ORAL at 11:38

## 2019-12-04 RX ADMIN — SIMETHICONE CHEW TAB 80 MG 80 MG: 80 TABLET ORAL at 11:47

## 2019-12-04 RX ADMIN — ONDANSETRON 4 MG: 2 INJECTION INTRAMUSCULAR; INTRAVENOUS at 05:33

## 2019-12-04 RX ADMIN — FAMOTIDINE 20 MG: 10 INJECTION, SOLUTION INTRAVENOUS at 08:22

## 2019-12-04 RX ADMIN — AMLODIPINE BESYLATE 5 MG: 5 TABLET ORAL at 08:37

## 2019-12-04 RX ADMIN — SODIUM CHLORIDE 7.5 MG/HR: 0.9 INJECTION, SOLUTION INTRAVENOUS at 04:26

## 2019-12-04 RX ADMIN — FAMOTIDINE 20 MG: 10 INJECTION, SOLUTION INTRAVENOUS at 18:03

## 2019-12-04 RX ADMIN — POTASSIUM CHLORIDE 20 MEQ: 14.9 INJECTION, SOLUTION INTRAVENOUS at 11:37

## 2019-12-04 RX ADMIN — CEFAZOLIN SODIUM 2000 MG: 2 SOLUTION INTRAVENOUS at 01:08

## 2019-12-04 NOTE — DISCHARGE INSTR - AVS FIRST PAGE
your pain medications, PROTONIX, and your new dosage of blood pressure medications from [facility]   Take Tylenol as instructed  Stay hydrated and follow your discharge diet progression   Mild nausea is ok as long as you can drink fluids  Take your Protonix daily; THIS IS A MEDICATION REPLACEMENT FOR OMEPRAZOLE  Do not take omeprazole prescribed for your post operative course  Crush or cut your pills and open capsules, mix with liquid to drink    Follow up with Dr Lyric Lutz and your PCP within the next week

## 2019-12-04 NOTE — PLAN OF CARE
Problem: CARDIOVASCULAR - ADULT  Goal: Maintains optimal cardiac output and hemodynamic stability  Description  INTERVENTIONS:  - Monitor I/O, vital signs and rhythm  - Monitor for S/S and trends of decreased cardiac output  - Administer and titrate ordered vasoactive medications to optimize hemodynamic stability  - Assess quality of pulses, skin color and temperature  - Assess for signs of decreased coronary artery perfusion  - Instruct patient to report change in severity of symptoms  Outcome: Progressing     Problem: RESPIRATORY - ADULT  Goal: Achieves optimal ventilation and oxygenation  Description  INTERVENTIONS:  - Assess for changes in respiratory status  - Assess for changes in mentation and behavior  - Position to facilitate oxygenation and minimize respiratory effort  - Oxygen administered by appropriate delivery if ordered  - Initiate smoking cessation education as indicated  - Encourage broncho-pulmonary hygiene including cough, deep breathe, Incentive Spirometry  - Assess the need for suctioning and aspirate as needed  - Assess and instruct to report SOB or any respiratory difficulty  - Respiratory Therapy support as indicated  Outcome: Progressing     Problem: GASTROINTESTINAL - ADULT  Goal: Minimal or absence of nausea and/or vomiting  Description  INTERVENTIONS:  - Administer IV fluids if ordered to ensure adequate hydration  - Maintain NPO status until nausea and vomiting are resolved  - Nasogastric tube if ordered  - Administer ordered antiemetic medications as needed  - Provide nonpharmacologic comfort measures as appropriate  - Advance diet as tolerated, if ordered  - Consider nutrition services referral to assist patient with adequate nutrition and appropriate food choices  Outcome: Progressing  Goal: Maintains adequate nutritional intake  Description  INTERVENTIONS:  - Monitor percentage of each meal consumed  - Identify factors contributing to decreased intake, treat as appropriate  - Assist with meals as needed  - Monitor I&O, weight, and lab values if indicated  - Obtain nutrition services referral as needed  Outcome: Progressing     Problem: SKIN/TISSUE INTEGRITY - ADULT  Goal: Incision(s), wounds(s) or drain site(s) healing without S/S of infection  Description  INTERVENTIONS  - Assess and document risk factors for skin impairment   - Assess and document dressing, incision, wound bed, drain sites and surrounding tissue  - Consider nutrition services referral as needed  - Oral mucous membranes remain intact  - Provide patient/ family education  Outcome: Progressing     Problem: PAIN - ADULT  Goal: Verbalizes/displays adequate comfort level or baseline comfort level  Description  Interventions:  - Encourage patient to monitor pain and request assistance  - Assess pain using appropriate pain scale  - Administer analgesics based on type and severity of pain and evaluate response  - Implement non-pharmacological measures as appropriate and evaluate response  - Consider cultural and social influences on pain and pain management  - Notify physician/advanced practitioner if interventions unsuccessful or patient reports new pain  Outcome: Progressing

## 2019-12-04 NOTE — ASSESSMENT & PLAN NOTE
· On Norvasc and Losartan at home prior to surgery   · Post op had hypertension that was not improved with IV blood pressure medications  · Goal SBP <160  · Will attempt to wean the cardene infusion after the patient is able to take oral medications

## 2019-12-04 NOTE — PROGRESS NOTES
Progress Note - Bethany Espinosa 1990, 30 y o  female MRN: [id number]    Unit/Bed#: ICU 14 Encounter: [id number]    Primary Care Provider: Nitesh Marlow MD   Date and time admitted to hospital: 12/3/2019  5:13 AM        * Status post Dale-en-y surgery   Assessment & Plan  · Status post gastric by pass surgery POD #1  · Post op in PACU had hypertension that was uncontrolled despite given 2 doses of Vasotec, Labetalol, and Hydralazine, currently on cardene  · Hopefully , we can discontinue this once we can restart her outpatient medications  · Bariatric surgery following as primary  · Out of bed as tolerated    Hypertensive urgency  Assessment & Plan  · On Norvasc and Losartan at home prior to surgery   · Post op had hypertension that was not improved with IV blood pressure medications  · Goal SBP <160  · Will attempt to wean the cardene infusion after the patient is able to take oral medications      EAMON (obstructive sleep apnea)  Assessment & Plan  · There was a report of EAMON however her outpatient sleep stuy does not support this diagnosis and she does not wear CPAP at home  · Encourage cough,deep breathing and incentive spirometer use  · OOB and ambulation    ----------------------------------------------------------------------------------------  HPI/24hr events: Feels better, some incisional abdominal pain    Disposition: Transfer to Med-Surg   Code Status: No Order  ---------------------------------------------------------------------------------------  SUBJECTIVE  Some incisional pain, denies CP or SOB, ambulating    Review of Systems  Review of systems was reviewed and negative unless stated above in HPI/24-hour events   ---------------------------------------------------------------------------------------  OBJECTIVE    Physical Exam   Constitutional: She is oriented to person, place, and time  She appears well-developed and well-nourished  No distress     Eyes: Pupils are equal, round, and reactive to light    Neck: Neck supple  Cardiovascular: Normal rate and regular rhythm  Abdominal: Soft  Bowel sounds are normal  She exhibits distension  There is tenderness  Lymphadenopathy:     She has no cervical adenopathy  Neurological: She is alert and oriented to person, place, and time  No cranial nerve deficit  Skin: Skin is warm and dry  Psychiatric: She has a normal mood and affect  Vitals   Vitals:    19 0145 19 0445 19 0500 19 0530   BP: 141/63 (!) 171/77  133/73   BP Location:       Pulse:  94  94   Resp:  14  14   Temp:   97 5 °F (36 4 °C)    TempSrc:   Temporal    SpO2:  91%  90%   Weight:       Height:         Temp (24hrs), Av 2 °F (36 8 °C), Min:97 5 °F (36 4 °C), Max:98 8 °F (37 1 °C)  Current: Temperature: 97 5 °F (36 4 °C)          Invasive/non-invasive ventilation settings   Respiratory    Lab Data (Last 4 hours)    None         O2/Vent Data (Last 4 hours)    None                Height and Weights   Height: 5' 9" (175 3 cm)  IBW: 66 2 kg  Body mass index is 38 35 kg/m²  Weight (last 2 days)     Date/Time   Weight    19 1707   118 (259 7)    19 0542   116 (255 73)                Intake and Output  I/O        0701 -  0700  0701 -  0700    P  O   120    I V  (mL/kg) 100 (0 9) 2927 1 (24 8)    IV Piggyback  80    Total Intake(mL/kg) 100 (0 9) 3127 1 (26 5)    Urine (mL/kg/hr)  3350 (1 2)    Blood  20    Total Output  3370    Net +100 -242 9                Nutrition       Diet Orders   (From admission, onward)             Start     Ordered    19 1653  Diet Bariatric; Bariatric Clear Liquid  Diet effective now     Question Answer Comment   Diet Type Bariatric    Bariatric Bariatric Clear Liquid    RD to adjust diet per protocol?  No        19 1652                Laboratory and Diagnostics:  Results from last 7 days   Lab Units 19  0507   WBC Thousand/uL 14 08*   HEMOGLOBIN g/dL 13 5   HEMATOCRIT % 41 4   PLATELETS Thousands/uL 218     Results from last 7 days   Lab Units 12/04/19  0507   SODIUM mmol/L 137   POTASSIUM mmol/L 3 4*   CHLORIDE mmol/L 103   CO2 mmol/L 25   ANION GAP mmol/L 9   BUN mg/dL 10   CREATININE mg/dL 0 56*   CALCIUM mg/dL 8 2*   GLUCOSE RANDOM mg/dL 115                       ABG:    VBG:          Micro        EKG:   Imaging: I have personally reviewed pertinent reports     and I have personally reviewed pertinent films in PACS    Active Medications  Scheduled Meds:    Current Facility-Administered Medications:  acetaminophen 975 mg Oral Q8H Dustin Cespedes MD    Or        acetaminophen 975 mg Oral Q8H Dustin Cespedes MD    amLODIPine 5 mg Oral Daily AleNANCI Cerda    escitalopram 10 mg Oral Daily Dustin Cespedes MD    famotidine 20 mg Intravenous BID Dustin Cespedes MD    gabapentin 300 mg Oral BID Dustin Cespedes MD    hydrALAZINE 10 mg Intravenous Q6H PRN NANCI Darby    lactated ringers 75 mL/hr Intravenous Continuous Dustin Cespedes MD Last Rate: 75 mL/hr (12/03/19 2152)   losartan 100 mg Oral Daily Penny K NANCI Hernandez    metoclopramide 10 mg Intravenous Q6H PRN Dustin Cespedes MD    morphine injection 4 mg Intravenous Q4H PRN Dustin Cespedes MD    morphine injection 2 mg Intravenous Q4H PRN Dustin Cespedes MD    niCARdipine 1-15 mg/hr Intravenous Titrated CapricNANCI Krishnan Last Rate: 5 mg/hr (12/04/19 0533)   ondansetron 4 mg Intravenous Q6H PRN Dustin Cespedes MD    ondansetron 4 mg Intravenous Q6H [address] [facility] MD Surendra    oxyCODONE 10 mg Oral Q4H PRN Dustin Cespedes MD    oxyCODONE 5 mg Oral Q4H PRN Dustin Cespedes MD    phenol 2 spray Mouth/Throat Q2H PRN Dustin Cespedes MD    promethazine 25 mg Intravenous Q6H PRN Dustin Cespedes MD    scopolamine 1 patch Transdermal Once Beverley Newsome MD    scopolamine 1 patch Transdermal [address], MD    simethicone 80 mg Oral 4x Daily PRN Dustin Cespedes MD      Continuous Infusions:    lactated ringers 75 mL/hr Last Rate: 75 mL/hr (12/03/19 2152)   niCARdipine 1-15 mg/hr Last Rate: 5 mg/hr (12/04/19 0533)     PRN Meds:     hydrALAZINE 10 mg Q6H PRN   metoclopramide 10 mg Q6H PRN   morphine injection 4 mg Q4H PRN   morphine injection 2 mg Q4H PRN   ondansetron 4 mg Q6H PRN   oxyCODONE 10 mg Q4H PRN   oxyCODONE 5 mg Q4H PRN   phenol 2 spray Q2H PRN   promethazine 25 mg Q6H PRN   simethicone 80 mg 4x Daily PRN       ---------------------------------------------------------------------------------------  Advance Directive and Living Will:      Power of :    POLST:    ---------------------------------------------------------------------------------------  Counseling / Coordination of [address] [place] [address], ANDREAS        Portions of the record may have been created with voice recognition software  Occasional wrong word or "sound a like" substitutions may have occurred due to the inherent limitations of voice recognition software    Read the chart carefully and recognize, using context, where substitutions have occurred

## 2019-12-04 NOTE — ASSESSMENT & PLAN NOTE
· There was a report of EMAON however her outpatient sleep stuy does not support this diagnosis and she does not wear CPAP at home  · Encourage cough,deep breathing and incentive spirometer use  · OOB and ambulation

## 2019-12-04 NOTE — ASSESSMENT & PLAN NOTE
· Status post gastric by pass surgery POD #1  · Post op in PACU had hypertension that was uncontrolled despite given 2 doses of Vasotec, Labetalol, and Hydralazine, currently on cardene  · Hopefully , we can discontinue this once we can restart her outpatient medications  · Bariatric surgery following as primary  · Out of bed as tolerated

## 2019-12-04 NOTE — PROGRESS NOTES
Progress Note - Bariatric Surgery   Escobar Spears 30 y o  female MRN: [id number]  Unit/Bed#: ICU 14 Encounter: [id number]      Subjective/Objective     Subjective: Patient with morbid obesity s/p LAPAROSCOPIC MILTON-EN-Y GASTRIC BYPASS AND INTRAOPERATIVE EGD 12/3/2019 with Dr Nigel Sicard  Post operatively transferred to Step Down Unit for persistent elevated blood pressures  Tolerating liquid diet without nausea or vomiting, pain adequately controlled on oral pain medication, ambulating without assistance, voiding well, using incentive spirometer  Denies fevers, chills, sweats, SOB, CP, calf pain  Objective:    /79   Pulse 98   Temp 98 9 °F (37 2 °C) (Temporal)   Resp 18   Ht 5' 9" (1 753 m)   Wt 118 kg (259 lb 11 2 oz)   LMP 12/03/2019   SpO2 92%   BMI 38 35 kg/m²       Intake/Output Summary (Last 24 hours) at 12/4/2019 0847  Last data filed at 12/4/2019 0700  Gross per 24 hour   Intake 4367 08 ml   Output 4770 ml   Net -402 92 ml       Invasive Devices     Peripheral Intravenous Line            Peripheral IV 12/03/19 Left Hand 1 day    Peripheral IV 12/03/19 Left Antecubital less than 1 day                ROS: 10-point system completed  All negative except see HPI  Physical Exam    General Appearance:    Alert, cooperative, no distress, appears stated age   Head:    Normocephalic, without obvious abnormality, atraumatic   Lungs:     Respirations unlabored   Heart:    Regular rate and rhythm   Abdomen:     Soft, appropriate tenderness,  no masses, no organomegaly,   non distended   Extremities:   Extremities normal, atraumatic, no cyanosis or edema   Neurologic:  Incision:  Psych:   Normal strength and sensation    Clean, dry, and intact    Normal mood and affect       Lab, Imaging and other studies:  I have personally reviewed pertinent lab results    , CBC:   Lab Results   Component Value Date    WBC 14 08 (H) 12/04/2019    HGB 13 5 12/04/2019    HCT 41 4 12/04/2019    MCV 93 12/04/2019    PLT 218 12/04/2019    MCH 30 3 12/04/2019    MCHC 32 6 12/04/2019    RDW 13 1 12/04/2019    MPV 9 8 12/04/2019   , CMP:   Lab Results   Component Value Date    SODIUM 137 12/04/2019    K 3 4 (L) 12/04/2019     12/04/2019    CO2 25 12/04/2019    BUN 10 12/04/2019    CREATININE 0 56 (L) 12/04/2019    CALCIUM 8 2 (L) 12/04/2019    EGFR 125 12/04/2019        VTE Mechanical Prophylaxis: sequential compression device    Assessment/Plan  1)  Morbid Obesity s/p LAPAROSCOPIC MILTON-EN-Y GASTRIC BYPASS AND INTRAOPERATIVE EGD 12/3/2019 with post operative course complicated with persistent elevated blood pressures not responsive to metoprolol IV push or Hydralazine IV push  Patient remained in the PACU and was transferred to Step Down unit for Cardene drip and contiued monitoring of blood pressures  Blood pressures have improved and drip is being weaned down to 25mg this AM  Patient afebrile and hemodynamically stable  Encourage PO fluids, ambulation, and incentive spirometry       - SLIM consult placed for home medication recommendations; patient has history of QT prolongation and concern arising regarding home omeprazole and Lexapro combination     Plan of care was discussed with patient and patient's nurse  Care plan discussed with Dr Mireya Johnson: Continue bariatric clear liquid diet, ambulation, incentive spirometry and if patient continues to improve clinically and Cardene is discontinued will discharge to home this afternoon

## 2019-12-04 NOTE — RESPIRATORY THERAPY NOTE
Per RN, pt does not wear CPAP for HS at home  According to pt's sleep study completed at  Sleep Lab on 3/12/2019, pt does not have sleep apnea  Spoke w/ CRNP, plan to not place pt on CPAP due to no diagnosed sleep apnea       Estevan Oneil, RT

## 2019-12-04 NOTE — UTILIZATION REVIEW
Initial Clinical Review    Elective    IP   surgical procedure    Age/Sex: 30 y o  female     Surgery Date:   12/3/2019    Procedure: ROBOTIC BYPASS GASTRIC MILTON-EN-Y, intraop egd (N/A)       Anesthesia:    general    Operative Findings:    Normal findings    POST  OP PACU:  Post  Op Hypertension  Without  Improvement  After hydralazine  X2 doses, labetalol and vasotec, SBP in the  200's and req  cardene drip  Transferred to stepdown         Admission Orders: Date/Time/Statement: Inpatient Admission Orders (From admission, onward)     Ordered        12/03/19 0937  Inpatient Admission  Once                   Orders Placed This Encounter   Procedures    Inpatient Admission     Standing Status:   Standing     Number of Occurrences:   1     Order Specific Question:   Admitting Physician     Answer:   ISAAC Crowell [[id number]]     Order Specific Question:   Level of Care     Answer:   Med Surg [16]     Order Specific Question:   Bed Type     Answer:   Bariatric [1]     Order Specific Question:   Estimated length of stay     Answer:   Inpatient Only Surgery     Vital Signs: /80   Pulse 76   Temp 97 7 °F (36 5 °C) (Temporal)   Resp 12   Ht 5' 9" (1 753 m)   Wt 118 kg (259 lb 11 2 oz)   LMP 12/03/2019   SpO2 90%   BMI 38 35 kg/m²      Diet:   Bariatric  Cl liq  diet    Mobility:  As [place]    DVT Prophylaxis:   SCD'S    Medications/Pain Control:   Scheduled Medications:    Medications:  acetaminophen 975 mg Oral Q8H   Or      acetaminophen 975 mg Oral Q8H   amLODIPine 5 mg Oral Daily   escitalopram 10 mg Oral Daily   famotidine 20 mg Intravenous BID   gabapentin 300 mg Oral BID   losartan 100 mg Oral Daily   ondansetron 4 mg Intravenous Q6H [address]   potassium chloride 40 mEq Oral Once   potassium chloride 20 mEq Intravenous Once   scopolamine 1 patch Transdermal Once   scopolamine 1 patch Transdermal Q72H     Continuous IV Infusions:    lactated ringers 75 mL/hr Intravenous Continuous   niCARdipine 1-15 mg/hr Intravenous Titrated     PRN Meds:    hydrALAZINE 10 mg Intravenous Q6H PRN   X1  12/3  And  X 1  12/4 thus far   metoclopramide 10 mg Intravenous Q6H PRN   morphine injection 4 mg Intravenous Q4H PRN   X1  12/3   morphine injection 2 mg Intravenous Q4H PRN   X1  12/3   ondansetron 4 mg Intravenous Q6H PRN   X1  12/3   oxyCODONE 10 mg Oral Q4H PRN   oxyCODONE 5 mg Oral Q4H PRN   phenol 2 spray Mouth/Throat Q2H PRN   promethazine 25 mg Intravenous Q6H PRN   simethicone 80 mg Oral 4x Daily PRN     tele    Network Utilization Review Department  [email or web address]  ATTENTION: Please call with any questions or concerns to [phone] and carefully listen to the prompts so that you are directed to the right person  All voicemails are confidential   Terry Goncalves all requests for admission clinical reviews, approved or denied determinations and any other requests to dedicated fax number below belonging to the campus where the patient is receiving treatment   List of dedicated fax numbers for the Facilities:  [address] [place] [address] DENIALS (Administrative/Medical Necessity) [phone]   [address]  (Maternity/NICU/Pediatrics) [phone]   Elmira Garcia [phone]   Ede Skelton [phone]   Dustin Eagle [phone]   [address] [facility] 1525  [phone]   [facility]  [phone]   Terry Landin [address] [phone]

## 2019-12-04 NOTE — PLAN OF CARE
Problem: CARDIOVASCULAR - ADULT  Goal: Maintains optimal cardiac output and hemodynamic stability  Description  INTERVENTIONS:  - Monitor I/O, vital signs and rhythm  - Monitor for S/S and trends of decreased cardiac output  - Administer and titrate ordered vasoactive medications to optimize hemodynamic stability  - Assess quality of pulses, skin color and temperature  - Assess for signs of decreased coronary artery perfusion  - Instruct patient to report change in severity of symptoms  Outcome: Progressing     Problem: RESPIRATORY - ADULT  Goal: Achieves optimal ventilation and oxygenation  Description  INTERVENTIONS:  - Assess for changes in respiratory status  - Assess for changes in mentation and behavior  - Position to facilitate oxygenation and minimize respiratory effort  - Oxygen administered by appropriate delivery if ordered  - Initiate smoking cessation education as indicated  - Encourage broncho-pulmonary hygiene including cough, deep breathe, Incentive Spirometry  - Assess the need for suctioning and aspirate as needed  - Assess and instruct to report SOB or any respiratory difficulty  - Respiratory Therapy support as indicated  Outcome: Progressing     Problem: GASTROINTESTINAL - ADULT  Goal: Minimal or absence of nausea and/or vomiting  Description  INTERVENTIONS:  - Administer IV fluids if ordered to ensure adequate hydration  - Maintain NPO status until nausea and vomiting are resolved  - Nasogastric tube if ordered  - Administer ordered antiemetic medications as needed  - Provide nonpharmacologic comfort measures as appropriate  - Advance diet as tolerated, if ordered  - Consider nutrition services referral to assist patient with adequate nutrition and appropriate food choices  Outcome: Progressing     Problem: SKIN/TISSUE INTEGRITY - ADULT  Goal: Incision(s), wounds(s) or drain site(s) healing without S/S of infection  Description  INTERVENTIONS  - Assess and document risk factors for skin impairment   - Assess and document dressing, incision, wound bed, drain sites and surrounding tissue  - Consider nutrition services referral as needed  - Oral mucous membranes remain intact  - Provide patient/ family education  Outcome: Progressing     Problem: PAIN - ADULT  Goal: Verbalizes/displays adequate comfort level or baseline comfort level  Description  Interventions:  - Encourage patient to monitor pain and request assistance  - Assess pain using appropriate pain scale  - Administer analgesics based on type and severity of pain and evaluate response  - Implement non-pharmacological measures as appropriate and evaluate response  - Consider cultural and social influences on pain and pain management  - Notify physician/advanced practitioner if interventions unsuccessful or patient reports new pain  Outcome: Progressing

## 2019-12-05 ENCOUNTER — TRANSITIONAL CARE MANAGEMENT (OUTPATIENT)
Dept: FAMILY MEDICINE CLINIC | Facility: CLINIC | Age: 29
End: 2019-12-05

## 2019-12-05 VITALS
SYSTOLIC BLOOD PRESSURE: 166 MMHG | RESPIRATION RATE: 18 BRPM | WEIGHT: 259.7 LBS | TEMPERATURE: 98.3 F | HEART RATE: 90 BPM | OXYGEN SATURATION: 97 % | HEIGHT: 69 IN | DIASTOLIC BLOOD PRESSURE: 80 MMHG | BODY MASS INDEX: 38.47 KG/M2

## 2019-12-05 LAB
ANION GAP SERPL CALCULATED.3IONS-SCNC: 9 MMOL/L (ref 4–13)
BUN SERPL-MCNC: 13 MG/DL (ref 5–25)
CALCIUM SERPL-MCNC: 8.7 MG/DL (ref 8.3–10.1)
CHLORIDE SERPL-SCNC: 103 MMOL/L (ref 100–108)
CO2 SERPL-SCNC: 25 MMOL/L (ref 21–32)
CREAT SERPL-MCNC: 0.74 MG/DL (ref 0.6–1.3)
GFR SERPL CREATININE-BSD FRML MDRD: 108 ML/MIN/1.73SQ M
GLUCOSE SERPL-MCNC: 110 MG/DL (ref 65–140)
POTASSIUM SERPL-SCNC: 4.1 MMOL/L (ref 3.5–5.3)
SODIUM SERPL-SCNC: 137 MMOL/L (ref 136–145)

## 2019-12-05 PROCEDURE — 99232 SBSQ HOSP IP/OBS MODERATE 35: CPT | Performed by: INTERNAL MEDICINE

## 2019-12-05 PROCEDURE — 99024 POSTOP FOLLOW-UP VISIT: CPT | Performed by: PHYSICIAN ASSISTANT

## 2019-12-05 PROCEDURE — 80048 BASIC METABOLIC PNL TOTAL CA: CPT | Performed by: INTERNAL MEDICINE

## 2019-12-05 RX ORDER — LOSARTAN POTASSIUM 25 MG/1
125 TABLET ORAL DAILY
Qty: 40 TABLET | Refills: 0 | Status: SHIPPED | OUTPATIENT
Start: 2019-12-05 | End: 2019-12-09 | Stop reason: DRUGHIGH

## 2019-12-05 RX ORDER — AMLODIPINE BESYLATE 5 MG/1
5 TABLET ORAL DAILY
Status: DISCONTINUED | OUTPATIENT
Start: 2019-12-05 | End: 2019-12-05 | Stop reason: HOSPADM

## 2019-12-05 RX ADMIN — OXYCODONE HYDROCHLORIDE 10 MG: 5 SOLUTION ORAL at 09:09

## 2019-12-05 RX ADMIN — ESCITALOPRAM OXALATE 10 MG: 10 TABLET ORAL at 09:10

## 2019-12-05 RX ADMIN — FAMOTIDINE 20 MG: 10 INJECTION, SOLUTION INTRAVENOUS at 09:14

## 2019-12-05 RX ADMIN — ACETAMINOPHEN 975 MG: 325 TABLET ORAL at 09:08

## 2019-12-05 RX ADMIN — OXYCODONE HYDROCHLORIDE 5 MG: 5 SOLUTION ORAL at 00:07

## 2019-12-05 RX ADMIN — OXYCODONE HYDROCHLORIDE 10 MG: 5 SOLUTION ORAL at 04:04

## 2019-12-05 RX ADMIN — HYDRALAZINE HYDROCHLORIDE 10 MG: 20 INJECTION INTRAMUSCULAR; INTRAVENOUS at 00:07

## 2019-12-05 RX ADMIN — LOSARTAN POTASSIUM 125 MG: 50 TABLET, FILM COATED ORAL at 09:11

## 2019-12-05 RX ADMIN — GABAPENTIN 300 MG: 250 SOLUTION ORAL at 09:19

## 2019-12-05 RX ADMIN — AMLODIPINE BESYLATE 5 MG: 5 TABLET ORAL at 09:11

## 2019-12-05 NOTE — PROGRESS NOTES
[facility] Progress Note  Patient: Ellie Diaz 30 y o  female   MRN: [id number]  PCP: Neeru Silva MD  Unit/Bed#: E5 -21 Encounter: [id number]  Date Of Visit: 12/05/19      Assessment/plan  1  S/p lap rygb- per primary team    2  htn urgency- pts bp has improved with norvasc and losartan at home  However due worry about compliance as pt states she will not take more then 5mg of norvasc and she does not want to take this every day due to risk of lower ext swelling  Did discuss other bp medications and pt is not willing to try at this time  In order to have pt adhere to treatment would decrease norvasc to 5mg daily  Will increase losartan to 125mg  She will need close follow up with pcp for her bp and should follow up on Monday  Pt is also going to obtain a blood pressure cuff  There could also be a element of pain that is adding to higher bp  At this point bp is stable in the 120-160  She is stable to be discharged from htn standpoint and she was encouraged to be compliant with medications  Could consider adding metoprolol if bp remains elevated if pt allows this medication  3  larissa- continue cpap    Subjective:   Pt seen and examined  Pt states her blood pressure at home is usually systolic in the 489I at home  She does not take 5mg of norvasc daily because the risk of lower ext swelling  She takes it as needed  She states she will not take 10mg of norvasc at home but is willing to take 5mg daily of norvasc  She still has pain about 4-5/10 at times  She states she will follow up with her pcp and she will get a bp cuff to evaluate her bp  No f/c no cp no sob no n/v/d  Objective:     Vitals: Blood pressure 166/80, pulse 90, temperature 98 3 °F (36 8 °C), temperature source Oral, resp  rate 18, height 5' 9" (1 753 m), weight 118 kg (259 lb 11 2 oz), last menstrual period 12/03/2019, SpO2 97 %, not currently breastfeeding  ,Body mass index is 38 35 kg/m²      Lab, Imaging and other studies:  Results from last 7 days   Lab Units 12/04/19  0507   WBC Thousand/uL 14 08*   HEMOGLOBIN g/dL 13 5   HEMATOCRIT % 41 4   PLATELETS Thousands/uL 218     Results from last 7 days   Lab Units 12/05/19  0500   POTASSIUM mmol/L 4 1   CHLORIDE mmol/L 103   CO2 mmol/L 25   BUN mg/dL 13   CREATININE mg/dL 0 74   CALCIUM mg/dL 8 7         No results found for: Bobie Lie, WOUNDCULT, SPUTUMCULTUR      No results found  Scheduled Meds:   Current Facility-Administered Medications:  acetaminophen 975 mg Oral Q8H Michelle Judie, PA-C   Or       acetaminophen 975 mg Oral Q8H Michelle Judie, PA-C   amLODIPine 5 mg Oral Daily Marli Ambron, DO   escitalopram 10 mg Oral Daily Michelle Judie, PA-C   famotidine 20 mg Intravenous BID Michelle Judie, PA-C   gabapentin 300 mg Oral BID Michelle Judie, PA-C   hydrALAZINE 10 mg Intravenous Q6H PRN Michelle Judie, PA-C   losartan 125 mg Oral Daily Marli Ambron, DO   morphine injection 4 mg Intravenous Q4H PRN Michelle Judie, PA-C   morphine injection 2 mg Intravenous Q4H PRN Michelle Judie, PA-C   oxyCODONE 10 mg Oral Q4H PRN Michelle Judie, PA-C   oxyCODONE 5 mg Oral Q4H PRN Michelle Judie, PA-C   phenol 2 spray Mouth/Throat Q2H PRN Michelle Judie, PA-C   potassium chloride 40 mEq Oral Once Michelle Judie, PA-C   promethazine 25 mg Intravenous Q6H PRN Michelle Judie, PA-C   scopolamine 1 patch Transdermal Q72H Michelle Judie, PA-C   simethicone 80 mg Oral 4x Daily PRN Michelle Judie, PA-C     Continuous Infusions:    PRN Meds: hydrALAZINE    morphine injection    morphine injection    oxyCODONE    oxyCODONE    phenol    promethazine    simethicone      Physical exam:  Physical Exam  General appearance: alert and oriented, in no acute distress  Head: Normocephalic, without obvious abnormality, atraumatic  Eyes: conjunctivae/corneas clear  PERRL, EOM's intact  Fundi benign    Neck: no adenopathy, no carotid bruit, no JVD, supple, symmetrical, trachea midline and thyroid not enlarged, symmetric, no tenderness/mass/nodules  Lungs: clear to auscultation bilaterally  Heart: regular rate and rhythm, S1, S2 normal, no murmur, click, rub or gallop  Abdomen: soft, non-tender; bowel sounds normal; no masses,  no organomegaly  Extremities: extremities normal, warm and well-perfused; no cyanosis, clubbing, or edema  Pulses: 2+ and symmetric  Skin: Skin color, texture, turgor normal  No rashes or lesions  Neurologic: Mental status: Alert, oriented, thought content appropriate

## 2019-12-05 NOTE — NURSING NOTE
Discharge instructions reviewed and questions answered  Discharge instructions and belongings sent with patient  Discharged to home with family

## 2019-12-05 NOTE — PROGRESS NOTES
Pt's BP at 2400 was 170/85  Pt has order for PRN hydralazine with  sbp >170  Pt rates pain 5/10  This nurse suggested treating pain before BP and pt was non-agreeable and states, "My blood pressure will just go up, give me the pill" PRN 5mg of oxycodone give with PRN 10mg IV hydralazine  Will continue to monitor

## 2019-12-05 NOTE — DISCHARGE SUMMARY
Discharge Summary - Chata Spence 30 y o  female MRN: [id number]    Unit/Bed#: E5 -01 Encounter: [id number]      Pre-Operative Diagnosis: Pre-Op Diagnosis Codes:     * Lifelong obesity [E66 9]     * EAMON (obstructive sleep apnea) [G47 33]     * Benign essential hypertension [I10]    Post-Operative Diagnosis: Post-Op Diagnosis Codes:     * Lifelong obesity [E66 9]     * EAMON (obstructive sleep apnea) [G47 33]     * Benign essential hypertension [I10]    Procedures Performed:  Procedure(s):  ROBOTIC BYPASS GASTRIC MILTON-EN-Y, intraop egd    Surgeon: Siddhartha Casillas MD    See H & P for full details of admission and Operative Note for full details of operations performed  Patient tolerated surgery well  Her post operative course was complaicted by hypertensive urgency requiring transfer to [facility]  In the morning postoperative Day 1, the patient had improvement of her blood pressure and was weaned off of her Cardene drip  Patient had Systolic Blood Pressures remaining in the 160s and no home blood pressure monitoring so patient was transferred to the floor  Patient had mild nausea and abdominal pain  Tolerated a clear liquid diet without vomiting  Able to ambulate and voiding independently  Home blood pressure medication adjustments made per SLIM  Patient was deemed ready for discharge home  Patient was seen and examined prior to discharge  Provisions for Follow-Up Care:  See After Visit Summary/Discharge Instructions for information related to follow-up care and home orders  Disposition: Home, in stable condition  Planned Readmission: No    Discharge Medications:  See After Visit Summary/Discharge Instructions for reconciled discharge medications provided to patient and family  Post Operative instructions: Reviewed with patient and/or family      Signature:   Desiree Nelson PA-C  Date: 12/5/2019 Time: 8:50 AM

## 2019-12-05 NOTE — DISCHARGE INSTRUCTIONS
Bariatric/Weight Loss Surgery  Hospital Discharge Instructions  1  ACTIVITY:  a  Progress as feels comfortable - a good rule is:  if you are doing something and it begins to hurt, stop doing the activity  Walk every hour while at home  b  Teressa Nissen may walk stairs if you do so slowly  c  You may shower 48 hours after surgery  d  Use your incentive spirometer 10 times per hour while awake for 1 week  e  Do NOT drive for 48 hours after surgery  No driving 24 hours after taking certain prescription pain medications   Examples of such medication are Percocet, Darvocet, Oxycodone, Tylenol #3, and Tylenol with Codeine  2  DIET  a  Stay on a liquid diet for 7 days after your surgery date, sipping slowly  Refer to your manual for examples of choices  Remember to keep your liquids sugar free or low calorie  You may have protein drinks  Make sure to drink 48 to 64 ounces per day of fluids  b  Teressa Nissen may advance to a pureed diet one week after surgery as instructed by your diet progression pamphlet  Once you get approval from your surgeon at your first post operative visit you may advance to the soft diet  3  MEDICATIONS:  a  The abdominal nerve block will wear off during the first 1-2 days that you are home, and you may become sore  Continue to take your Tylenol and your pain medication as instructed  b  Start vitamins and minerals when you get home  c  Anti-acid Medication as per prescription  d  Other medications as indicated on the Physician Patient Discharge Instructions form given to you at the time of discharge  e  Make sure that you are splitting your pill or tablet medications in halves or fourths or even crushing them before you take them  Capsules should be opened and mixed with water or jello  You need to do this for at least 4 weeks after surgery  Eventually you will be able to take your medications the regular way as they were prescribed     f  Teressa Nissen will need to consult with your Family Doctor in regards to all your prescribed medication, particularly those for blood pressure and diabetes  As you lose weight, medical conditions may change, requiring an alteration or elimination of the drug dose  g  DO NOT TAKE BIRTH CONTROL(BC) MEDICATIONS, INSERT BC VAGINAL RINGS, OR PLACE IUD OR ANY OTHER BC METHODS UNTIL 31 DAYS FROM DAY OF DISCHARGE FROM HOSPITAL  THIS PLACES YOU AT HIGH RISK FOR A POTENTIALLY LIFE THREATENING BLOOD CLOT  Remember to always use barrier methods for birth control and speak to your GYN about using two forms of birth control to start 31 days after surgery  It is very important to avoid pregnancy until at least 18-24 months after surgery  4  INCISION CARE  a  You may shower and get incisions wet 2 days after surgery  No soaking tub baths or swimming for 30 days after surgery  Keep abdominal area and incisions clean  Use soap and water to create a good lather and rinse off  Do not scrub incisions  b  If you have a drain, empty the drain as the nurses instructed  5  FOLLOW-UP APPOINTMENT should be made for one week after discharge  Call surgeons office at [phone] to schedule an appointment  6  CALL YOUR DOCTOR FOR:  pain not controlled by pain medications, a temperature greater than 101 5° F, any increase or change in drainage or redness from any incision, any vomiting or inability to keep liquids down, shortness of breath, shoulder pain, or bleeding  Apnea Monitors   WHAT YOU SHOULD KNOW:   An apnea monitor is a device that measures how well you breathe  Healthcare providers read the information collected by the device to plan treatment  Sleep apnea is a condition that causes you to stop breathing one or more times while you sleep  You may stop breathing for 5 to 20 seconds, and wake up several times to catch your breath  INSTRUCTIONS:   If an alarm is activated:  Stay calm and try to wake the person  Do not  shake your baby to wake him   You can cause serious injury  · Call 911 if you see any of the following signs of an emergency:      ¨ Red or blue face     ¨ Eyes stare straight ahead    ¨ Stiff posture    ¨ No chest movement    What may trigger the alarm:   · Low blood oxygen levels    · Shallow breathing    · Very slow or very fast heartbeat    · No breathing for 5 seconds or longer    · Little or no power left in the battery    · No electricity coming from the power outlet    · Wires attached to the person loosen or fall off  Follow up with your healthcare provider as directed:  Write down your questions so you remember to ask them during your visits  Contact your healthcare provider if:   · You cannot make it to your next follow-up visit  · You have questions about how to use the monitor  · You have questions or concerns about your condition or care  Return to the emergency department if:   · The person cannot be woken up or does not respond when you talk to him  · The person is not breathing  · The person turns blue, or does not move  © 2014 [employer] is for End User's use only and may not be sold, redistributed or otherwise used for commercial purposes  All illustrations and images included in CareNotes® are the copyrighted property of [employer]  or Tom Rachel  The above information is an  only  It is not intended as medical advice for individual conditions or treatments  Talk to your doctor, nurse or pharmacist before following any medical regimen to see if it is safe and effective for you

## 2019-12-06 ENCOUNTER — TELEPHONE (OUTPATIENT)
Dept: BARIATRICS | Facility: CLINIC | Age: 29
End: 2019-12-06

## 2019-12-09 ENCOUNTER — OFFICE VISIT (OUTPATIENT)
Dept: FAMILY MEDICINE CLINIC | Facility: CLINIC | Age: 29
End: 2019-12-09
Payer: COMMERCIAL

## 2019-12-09 VITALS
TEMPERATURE: 97.4 F | BODY MASS INDEX: 36.83 KG/M2 | HEART RATE: 88 BPM | DIASTOLIC BLOOD PRESSURE: 60 MMHG | OXYGEN SATURATION: 99 % | HEIGHT: 68 IN | SYSTOLIC BLOOD PRESSURE: 110 MMHG | WEIGHT: 243 LBS

## 2019-12-09 DIAGNOSIS — Z30.011 ENCOUNTER FOR INITIAL PRESCRIPTION OF CONTRACEPTIVE PILLS: ICD-10-CM

## 2019-12-09 DIAGNOSIS — I10 BENIGN ESSENTIAL HYPERTENSION: Primary | ICD-10-CM

## 2019-12-09 DIAGNOSIS — Z98.84 BARIATRIC SURGERY STATUS: ICD-10-CM

## 2019-12-09 LAB — SL AMB POCT URINE HCG: NEGATIVE

## 2019-12-09 PROCEDURE — 81025 URINE PREGNANCY TEST: CPT | Performed by: FAMILY MEDICINE

## 2019-12-09 PROCEDURE — 99496 TRANSJ CARE MGMT HIGH F2F 7D: CPT | Performed by: FAMILY MEDICINE

## 2019-12-09 PROCEDURE — 1111F DSCHRG MED/CURRENT MED MERGE: CPT | Performed by: FAMILY MEDICINE

## 2019-12-09 RX ORDER — LOSARTAN POTASSIUM 100 MG/1
100 TABLET ORAL DAILY
Qty: 30 TABLET | Refills: 0 | Status: SHIPPED | OUTPATIENT
Start: 2019-12-09 | End: 2020-02-11 | Stop reason: SDUPTHER

## 2019-12-09 RX ORDER — NORGESTIMATE AND ETHINYL ESTRADIOL 7DAYSX3 LO
1 KIT ORAL DAILY
Qty: 28 TABLET | Refills: 3 | Status: SHIPPED | OUTPATIENT
Start: 2019-12-09 | End: 2020-05-15 | Stop reason: ALTCHOICE

## 2019-12-09 NOTE — ASSESSMENT & PLAN NOTE
Chronic was uncontrolled and hospital stay a status of post gastric bypass the patient will continue with the losartan 100 mg once a day proper use discussed the patient low-salt diet discussed the patient command to the patient to monitor her blood pressure start getting low with her losing weight to cut dose in half will re-evaluate her in 1 months

## 2019-12-09 NOTE — PROGRESS NOTES
Assessment/Plan:     Benign essential hypertension   Chronic was uncontrolled and hospital stay a status of post gastric bypass the patient will continue with the losartan 100 mg once a day proper use discussed the patient low-salt diet discussed the patient command to the patient to monitor her blood pressure start getting low with her losing weight to cut dose in half will re-evaluate her in 1 months    Status post Dale-en-y surgery    Morbid obesity status post gastric bypass patient tolerated well she is currently on liquid diet will follow up with the surgeon    Encounter for initial prescription of contraceptive pills   Urine pregnancy is negative last menstrual period was December 3, 2019 we discussed with the patient side effect the the complication of the oral contraceptive no personal or family history of clotting disorder she is not smoker anymore will start her on Ortho-Tri-Cyclen Lo proper use of medication discussed with the patient       Diagnoses and all orders for this visit:    Benign essential hypertension  -     losartan (COZAAR) 100 MG tablet; Take 1 tablet (100 mg total) by mouth daily    Status post Dale-en-y surgery     Encounter for initial prescription of contraceptive pills  -     POCT urine HCG  -     norgestimate-ethinyl estradiol (ORTHO TRI-CYCLEN LO) 0 18/0 215/0 25 MG-25 MCG per tablet; Take 1 tablet by mouth daily         Subjective:     Patient ID: Sunitha Weaver is a 30 y o  female      Patient was admitted on December 3rd for gastric bypass and patient had the procedure she developed the hypertension  Urgency after the surgery patient was started the on Cardene drip blood pressure of drop down to 160 she was able to be off the drip it the recommend to use the amlodipine patient did not want to do secondary to swelling lower extremity she start on the losartan she been taking 25 mg for off of them with the equivalent dose 125 and patient was discharged stable without any symptom on December 5th the patient tolerated procedure without any problem no nausea no vomiting an no diarrhea  A currently on the liquid diet no fever no burning sensation on urination and no cough the hospital record review with the patient   today patient had the the for tablet of the losartan which is equivalent 100 mg once a day blood pressure is normal patient has monitor her blood pressure and it was on the morning before the medication 130/84   also patient she interested to start the birth control she did stop smoking no personal or family history of the hyper coagulation patient used to take both control in younger age last menstrual per yet was in December 3rd      Review of Systems   Constitutional: Negative for fatigue and fever  HENT: Negative for ear pain, sinus pressure, sinus pain and sore throat  Eyes: Negative for pain and redness  Respiratory: Negative for cough, chest tightness and shortness of breath  Cardiovascular: Negative for chest pain, palpitations and leg swelling  Gastrointestinal: Negative for abdominal pain, blood in stool, constipation, diarrhea and nausea  Genitourinary: Negative for flank pain, frequency and hematuria  Musculoskeletal: Negative for back pain and joint swelling  Skin: Negative for rash  Neurological: Negative for dizziness, numbness and headaches  Hematological: Does not bruise/bleed easily  Objective:     Physical Exam   Constitutional: She is oriented to person, place, and time  She appears well-developed and well-nourished  HENT:   Head: Normocephalic  Right Ear: External ear normal    Left Ear: External ear normal    Eyes: Conjunctivae and EOM are normal  Right eye exhibits no discharge  Left eye exhibits no discharge  Neck: No JVD present  Cardiovascular: Normal rate, regular rhythm and normal heart sounds  Exam reveals no gallop  No murmur heard  Pulmonary/Chest: Effort normal  No respiratory distress  She has no wheezes   She has no rales  She exhibits no tenderness  Abdominal: She exhibits no mass  There is no tenderness  There is no rebound  Musculoskeletal: She exhibits no edema or tenderness  Neurological: She is alert and oriented to person, place, and time  Skin: No rash noted  No erythema  Vitals:    12/09/19 1103   BP: 110/60   Pulse: 88   Temp: (!) 97 4 °F (36 3 °C)   TempSrc: Tympanic   SpO2: 99%   Weight: 110 kg (243 lb)   Height: 5' 7 5" (1 715 m)       Transitional Care Management Review:  Guru Vigil is a 30 y o  female here for TCM follow up  During the TCM phone call patient stated:    TCM Call (since 11/8/2019)     Date and time call was made  12/5/2019 11:17 AM    Hospital care reviewed  Records reviewed    Patient was hospitialized at  [facility]    Date of Admission  12/03/19    Date of discharge  12/05/19    Diagnosis  gastric bypass htn     Disposition  Home    Were the patients medications reviewed and updated  No    Current Symptoms  None      TCM Call (since 11/8/2019)     Post hospital issues  None    Should patient be enrolled in anticoag monitoring? No    Scheduled for follow up? Yes    Patients specialists  Other (comment)    Other specialists names  Anupam Dubois    Did you obtain your prescribed medications  Yes    Do you need help managing your prescriptions or medications  No    Is transportation to your appointment needed  No    I have advised the patient to call PCP with any new or worsening symptoms  hans WHEELER     Living Arrangements  Family members;  Children    Support System  Spouse    The type of support provided  Financial; Emotional; Physical    Do you have social support  Yes, as much as I need    Are you recieving any outpatient services  No    Are you recieving home care services  No    Are you using any community resources  No    Current waiver services  No    Have you fallen in the last 12 months  No    Interperter language line needed  No    Counseling Patient    Counseling topics  Importance of RX compliance          Chad Polanco MD

## 2019-12-09 NOTE — ASSESSMENT & PLAN NOTE
Morbid obesity status post gastric bypass patient tolerated well she is currently on liquid diet will follow up with the surgeon

## 2019-12-09 NOTE — ASSESSMENT & PLAN NOTE
Urine pregnancy is negative last menstrual period was December 3, 2019 we discussed with the patient side effect the the complication of the oral contraceptive no personal or family history of clotting disorder she is not smoker anymore will start her on Ortho-Tri-Cyclen Lo proper use of medication discussed with the patient

## 2019-12-12 ENCOUNTER — OFFICE VISIT (OUTPATIENT)
Dept: BARIATRICS | Facility: CLINIC | Age: 29
End: 2019-12-12

## 2019-12-12 VITALS
BODY MASS INDEX: 36.53 KG/M2 | SYSTOLIC BLOOD PRESSURE: 128 MMHG | WEIGHT: 241 LBS | TEMPERATURE: 97.1 F | HEART RATE: 84 BPM | RESPIRATION RATE: 18 BRPM | HEIGHT: 68 IN | DIASTOLIC BLOOD PRESSURE: 76 MMHG

## 2019-12-12 DIAGNOSIS — Z98.84 BARIATRIC SURGERY STATUS: Primary | ICD-10-CM

## 2019-12-12 PROCEDURE — RECHECK: Performed by: DIETITIAN, REGISTERED

## 2019-12-12 PROCEDURE — 99024 POSTOP FOLLOW-UP VISIT: CPT | Performed by: SURGERY

## 2019-12-12 NOTE — PROGRESS NOTES
Assessment/Plan:     Patient ID: Guru Vigil is a 30 y o  female  Diagnoses and all orders for this visit:    Bariatric surgery status          - Status post Dale-En-Y Gastric Bypass with Dr Sharif Hudson on 12/03/2019  Patient is presenting for the first postoperative visit  Patient's hospital stay was uneventful without any complications  Patient is doing well, has no complaints, and is taking vitamins and omeprazole as instructed  Currently tolerating the blenderized diet, will advance to soft diet  Patient will also be meeting with our dietician today to review her vitamin and mineral supplements and also go over her diet and emphasize postoperative commitment and compliance  The patient was also instructed to start exercising on a regular basis  However, we recommend no heavy lifting, or weight exercises for another 2 weeks  F/U in 4 weeks  - Follow-up in 1 month  We kindly ask that your arrive 15 minutes before your scheduled appointment time with your provider to allow our staff to room you, get your vital signs and update your chart  - Get lab work done prior to next visit  Please call the office if you need a script  - Call our office if you have any problems with abdominal pain especially associated with fever, chills, nausea, vomiting or any other concerns  - All  Post-bariatric surgery patients should be aware that very small quantities of any alcohol can cause impairment and it is very possible not to feel the effect  The effect can be in the system for several hours  It is also a stomach irritant  - It is advised to AVOID alcohol, Nonsteroidal antiinflammatory drugs (NSAIDS) and nicotine of all forms   Any of these can cause stomach irritation/pain  Keep up the good work! Subjective:      Patient ID: Guru Vigil is a 30 y o  female  -s/p Dale-En-Y Gastric Bypass with Dr Sharif Hudson on 12/03/2019  Presents to the office today for first post operative visit   Tolerating diet without issues; denies N/V, dysphagia, reflux  Overall doing Well  Current BMI is Body mass index is 37 19 kg/m²  The following portions of the patient's history were reviewed and updated as appropriate: allergies, current medications, past family history, past medical history, past social history, past surgical history and problem list     Review of Systems   All other systems reviewed and are negative  Objective:    /76   Pulse 84   Temp (!) 97 1 °F (36 2 °C)   Resp 18   Ht 5' 7 5" (1 715 m)   Wt 109 kg (241 lb)   LMP 12/03/2019   BMI 37 19 kg/m²      Physical Exam   Constitutional: She is oriented to person, place, and time  She appears well-developed and well-nourished  Eyes: Pupils are equal, round, and reactive to light  Conjunctivae and EOM are normal    Neck: Normal range of motion  Neck supple  Cardiovascular: Normal rate, regular rhythm and normal heart sounds  Pulmonary/Chest: Effort normal and breath sounds normal    Abdominal:   Abdomen soft, nondistended, minimally tender at incision sites  Incisions healing well without evidence of infection  Musculoskeletal: Normal range of motion  Neurological: She is alert and oriented to person, place, and time  Skin: Skin is warm and dry  Psychiatric: She has a normal mood and affect   Her behavior is normal  Judgment and thought content normal

## 2019-12-12 NOTE — PROGRESS NOTES
Weight Management Nutrition Class     Diagnosis: Obesity    Bariatric Surgeon: Dr Juliette Gutiérrez    Surgery: Gastric Bypass Laparoscopic    Class: first post op note    Topics discussed today include:     fluid goals post op, protein goals post op, constipation, chew food well, exercise, diet progression, protein supplems, vitamin/mineral supplements and calcium supplements    Patient was able to verbalize basic diet (protein, fluid, vitamin and mineral) recommendations and possible nutrition-related complications   Yes

## 2020-01-08 ENCOUNTER — OFFICE VISIT (OUTPATIENT)
Dept: FAMILY MEDICINE CLINIC | Facility: CLINIC | Age: 30
End: 2020-01-08
Payer: COMMERCIAL

## 2020-01-08 VITALS
BODY MASS INDEX: 35.01 KG/M2 | TEMPERATURE: 97.8 F | HEART RATE: 88 BPM | HEIGHT: 68 IN | OXYGEN SATURATION: 99 % | DIASTOLIC BLOOD PRESSURE: 90 MMHG | WEIGHT: 231 LBS | SYSTOLIC BLOOD PRESSURE: 146 MMHG

## 2020-01-08 DIAGNOSIS — I10 CHRONIC HYPERTENSION: ICD-10-CM

## 2020-01-08 DIAGNOSIS — R07.9 CHEST PAIN ON EXERTION: ICD-10-CM

## 2020-01-08 DIAGNOSIS — Z98.84 BARIATRIC SURGERY STATUS: ICD-10-CM

## 2020-01-08 DIAGNOSIS — J06.9 VIRAL UPPER RESPIRATORY TRACT INFECTION: ICD-10-CM

## 2020-01-08 DIAGNOSIS — Z72.0 TOBACCO ABUSE: ICD-10-CM

## 2020-01-08 DIAGNOSIS — Z00.01 ENCOUNTER FOR WELL ADULT EXAM WITH ABNORMAL FINDINGS: Primary | ICD-10-CM

## 2020-01-08 PROCEDURE — 3725F SCREEN DEPRESSION PERFORMED: CPT | Performed by: FAMILY MEDICINE

## 2020-01-08 PROCEDURE — 99395 PREV VISIT EST AGE 18-39: CPT | Performed by: FAMILY MEDICINE

## 2020-01-08 PROCEDURE — 99214 OFFICE O/P EST MOD 30 MIN: CPT | Performed by: FAMILY MEDICINE

## 2020-01-08 PROCEDURE — 3008F BODY MASS INDEX DOCD: CPT | Performed by: FAMILY MEDICINE

## 2020-01-08 RX ORDER — PANTOPRAZOLE SODIUM 40 MG/1
40 TABLET, DELAYED RELEASE ORAL DAILY
Qty: 30 TABLET | Refills: 2 | Status: SHIPPED | OUTPATIENT
Start: 2020-01-08 | End: 2020-02-11 | Stop reason: SDUPTHER

## 2020-01-08 RX ORDER — AMLODIPINE BESYLATE 5 MG/1
5 TABLET ORAL DAILY
Qty: 30 TABLET | Refills: 0
Start: 2020-01-08 | End: 2020-03-13

## 2020-01-08 NOTE — PROGRESS NOTES
FAMILY PRACTICE HEALTH MAINTENANCE OFFICE VISIT  [facility]    NAME: Kurtis Baldwin  AGE: 30 y o  SEX: female  : 1990     DATE: 1/10/2020    Assessment and Plan     1  Encounter for well adult exam with abnormal findings  Assessment & Plan:  Advice and education were given regarding nutrition, aerobic exercises, weight bearing exercises, cardiovascular risk reduction, fall risk reduction, and age appropriate supplements  The patient was counseled regarding instructions for management, risk factor reductions, prognosis, risks and benefits of treatment options, patient and family education, and importance of compliance with treatment  2  Chest pain on exertion  Assessment & Plan:  Symptomatic new diagnosis recurrent  patient does have multiple risk factor    Plan for stress echo ,disucss with patient important stop smoking     Orders:  -     Echo stress test w contrast if indicated; Future; Expected date: 2020    3  Viral upper respiratory tract infection  Assessment & Plan:  Acute symptomatic discussed with the patient supportive treatment include well hydration InCase of fever decrease in oral intake to call the office      4  Status post Dale-en-y surgery   -     pantoprazole (PROTONIX) 40 mg tablet; Take 1 tablet (40 mg total) by mouth daily    5  Chronic hypertension  -     amLODIPine (NORVASC) 5 mg tablet; Take 1 tablet (5 mg total) by mouth daily  -     Echo stress test w contrast if indicated; Future; Expected date: 2020    6   Tobacco abuse  Assessment & Plan:  A chronic patient on and off smoking she did stop smoking for while and now she restart again patient aware of the complication of the smoking she is not ready to quit smoking yet        · Patient Counseling:   · Nutrition: Stressed importance of a well balanced diet, moderation of sodium/saturated fat, caloric balance and sufficient intake of fiber  · Exercise: Stressed the importance of regular exercise with a goal of 150 minutes per week  · Dental Health: Discussed daily flossing and brushing and regular dental visits     · Immunizations reviewed: Up To Date  · Discussed benefits of:  Cervical Cancer screening and Screening labs   BMI Counseling: Body mass index is 35 12 kg/m²  Discussed with patient's BMI with her         Chief Complaint     Chief Complaint   Patient presents with    Physical Exam    Blood Pressure Check       History of Present Illness     Patient here for annual physical exam and has concerned about chest pain on exertion and upper respiratory symptom  Patient complained about chest pain and palpitation on exertion deny any wheezing no hematosis no lower extremity edema deny any headache blurred vision no weakness or lateralized of the symptom no fatigue no fever no chills and deny any abdomen pain nausea vomiting or diarrhea no renal problem no rash no fever no change in the weight no change in the mood  The patient restart smoke again after she had her gastric bypass she does do special diet for the gastric bypass she does do exercise on and off      Well Adult Physical   Patient here for a comprehensive physical exam       Diet and Physical Activity  Diet: gastric surgery diet  Exercise: intermittently      Depression Screen  PHQ-9 Depression Screening    PHQ-9:    Frequency of the following problems over the past two weeks:       Little interest or pleasure in doing things:  0 - not at all  Feeling down, depressed, or hopeless:  0 - not at all  PHQ-2 Score:  0          General Health  Hearing: Normal:  bilateral  Vision: wears glasses  Dental: regular dental visits    Reproductive Health  Follows with gynecologist      The following portions of the patient's history were reviewed and updated as appropriate: allergies, current medications, past family history, past medical history, past social history, past surgical history and problem list     Review of Systems     Review of Systems   Constitutional: Negative for fatigue and fever  HENT: Positive for congestion  Negative for ear pain, sinus pressure, sinus pain and sore throat  Eyes: Negative for pain and redness  Respiratory: Positive for cough  Negative for chest tightness and shortness of breath  Cardiovascular: Positive for chest pain  Negative for leg swelling  Gastrointestinal: Negative for abdominal pain, blood in stool, constipation, diarrhea and nausea  Endocrine: Negative for heat intolerance and polydipsia  Genitourinary: Negative for flank pain, frequency and hematuria  Musculoskeletal: Negative for back pain and joint swelling  Skin: Negative for rash  Neurological: Negative for dizziness, numbness and headaches  Hematological: Does not bruise/bleed easily  Psychiatric/Behavioral: Negative for agitation and behavioral problems         Past Medical History     Past Medical History:   Diagnosis Date    Arthritis     Chronic kidney disease     Depression     Heartburn     Hypertension     Kidney stone     Obesity     Seasonal allergies     Sleep apnea     No cpap    Wears glasses        Past Surgical History     Past Surgical History:   Procedure Laterality Date    EGD      WI LAP GASTRIC BYPASS/MILTON-EN-Y N/A 12/3/2019    Procedure: ROBOTIC BYPASS GASTRIC MILTON-EN-Y, intraop egd;  Surgeon: Ruperto Edward MD;  Location: [facility];  Service: Bariatrics       Social History     Social History     Socioeconomic History    Marital status: Significant Other     Spouse name: None    Number of children: None    Years of education: None    Highest education level: None   Occupational History    None   Social Needs    Financial resource strain: Not hard at all   Saleem-Keiko insecurity:     Worry: Never true     Inability: Never true    Transportation needs:     Medical: No     Non-medical: No   Tobacco Use    Smoking status: Current Every Day Smoker     Packs/day: 0 50     Years: 10 00     Pack years: 5 00     Types: Cigarettes     Last attempt to quit: 10/4/2019     Years since quittin 2    Smokeless tobacco: Current User   Substance and Sexual Activity    Alcohol use: Yes     Frequency: 2-4 times a month     Drinks per session: 3 or 4     Binge frequency: Never     Comment: social    Drug use: No    Sexual activity: Yes     Partners: Male   Lifestyle    Physical activity:     Days per week: 5 days     Minutes per session: 20 min    Stress:  Only a little   Relationships    Social connections:     Talks on phone: More than three times a week     Gets together: More than three times a week     Attends Baptism service: Never     Active member of club or organization: No     Attends meetings of clubs or organizations: Never     Relationship status: Living with partner    Intimate partner violence:     Fear of current or ex partner: No     Emotionally abused: No     Physically abused: No     Forced sexual activity: No   Other Topics Concern    None   Social History Narrative    None       Family History     Family History   Problem Relation Age of Onset    Hypertension Family     Hypertension Mother     Alcohol abuse Father     Diabetes Father     No Known Problems Brother     Heart disease Neg Hx     Thyroid disease Neg Hx     Stroke Neg Hx        Current Medications       Current Outpatient Medications:     acetaminophen (TYLENOL) 325 mg tablet, Take 650 mg by mouth every 6 (six) hours as needed for mild pain, Disp: , Rfl:     amLODIPine (NORVASC) 5 mg tablet, Take 1 tablet (5 mg total) by mouth daily, Disp: 30 tablet, Rfl: 0    escitalopram (LEXAPRO) 10 mg tablet, Take 1 tablet (10 mg total) by mouth daily (Patient taking differently: Take 10 mg by mouth as needed ), Disp: 30 tablet, Rfl: 1    fluticasone (FLONASE) 50 mcg/act nasal spray, 2 sprays into each nostril daily, Disp: 1 Bottle, Rfl: 2    losartan (COZAAR) 100 MG tablet, Take 1 tablet (100 mg total) by mouth daily, Disp: 30 tablet, Rfl: 0    mometasone (NASONEX) 50 mcg/act nasal spray, 2 sprays into each nostril as needed , Disp: , Rfl:     norgestimate-ethinyl estradiol (ORTHO TRI-CYCLEN LO) 0 18/0 215/0 25 MG-25 MCG per tablet, Take 1 tablet by mouth daily, Disp: 28 tablet, Rfl: 3    pantoprazole (PROTONIX) 40 mg tablet, Take 1 tablet (40 mg total) by mouth daily, Disp: 30 tablet, Rfl: 2     Allergies     No Known Allergies    Objective     /90   Pulse 88   Temp 97 8 °F (36 6 °C) (Tympanic)   Ht 5' 8" (1 727 m)   Wt 105 kg (231 lb)   LMP 01/01/2020 (Exact Date)   SpO2 99%   Breastfeeding? No   BMI 35 12 kg/m²      Physical Exam   Constitutional: She is oriented to person, place, and time  She appears well-developed and well-nourished  HENT:   Head: Normocephalic  Right Ear: External ear normal    Left Ear: External ear normal    Eyes: Conjunctivae and EOM are normal  Right eye exhibits no discharge  Left eye exhibits no discharge  Neck: No JVD present  Cardiovascular: Normal rate, regular rhythm and normal heart sounds  Exam reveals no gallop  No murmur heard  Pulmonary/Chest: Effort normal  No respiratory distress  She has no wheezes  She has no rales  She exhibits no tenderness  Abdominal: She exhibits no mass  There is no tenderness  There is no rebound  Musculoskeletal: She exhibits no edema or tenderness  Neurological: She is alert and oriented to person, place, and time  Skin: No rash noted  No erythema  Psychiatric: She has a normal mood and affect   Her behavior is normal            Jeana Arora MD  [address] [place] [address]

## 2020-01-08 NOTE — PROGRESS NOTES
Subjective:   Chief Complaint   Patient presents with    Physical Exam    Blood Pressure Check        Patient ID: Adria Chavez is a 30 y o  female  Patient office for annual physical exam and she does have multiple concern  She is concerned about the chest pain happen x4 times within the last the 3  week and it happen mostly at exertion associated with palpitation and no lower extremity edema no increase in the weight and no dizziness no light headache patient known to have history of the obesity recently had a gastric bypass and also she does have a history of the hypertension and smoking the patient deny any wheezing no hematosis  The she does not have the chest pain now it comes and goes  Patient also concerned about upper respiratory symptoms started 1 day ago including congestion a productive cough clear color sputum no body ache no fever no chills no decrease in oral intake no fatigue positive sick contact the her son has similar problem         The following portions of the patient's history were reviewed and updated as appropriate: allergies, current medications, past family history, past medical history, past social history, past surgical history and problem list     Review of Systems   Constitutional: Negative for fatigue and fever  HENT: Positive for congestion  Negative for ear pain, sinus pressure, sinus pain and sore throat  Eyes: Negative for pain and redness  Respiratory: Positive for cough  Negative for chest tightness and shortness of breath  Cardiovascular: Positive for chest pain  Negative for palpitations and leg swelling  Gastrointestinal: Negative for abdominal pain, blood in stool, constipation, diarrhea and nausea  Genitourinary: Negative for flank pain, frequency and hematuria  Musculoskeletal: Negative for back pain and joint swelling  Skin: Negative for rash  Neurological: Negative for dizziness, numbness and headaches     Hematological: Does not bruise/bleed easily  Objective:  Vitals:    01/08/20 1212   BP: 146/90   Pulse: 88   Temp: 97 8 °F (36 6 °C)   TempSrc: Tympanic   SpO2: 99%   Weight: 105 kg (231 lb)   Height: 5' 8" (1 727 m)      Physical Exam   Constitutional: She is oriented to person, place, and time  She appears well-developed and well-nourished  HENT:   Head: Normocephalic  Right Ear: External ear normal    Left Ear: External ear normal    Eyes: Conjunctivae and EOM are normal  Right eye exhibits no discharge  Left eye exhibits no discharge  Neck: No JVD present  Cardiovascular: Normal rate, regular rhythm and normal heart sounds  Exam reveals no gallop  No murmur heard  Pulmonary/Chest: Effort normal  No respiratory distress  She has no wheezes  She has no rales  She exhibits no tenderness  Abdominal: She exhibits no mass  There is no tenderness  There is no rebound  Musculoskeletal: She exhibits no edema or tenderness  Neurological: She is alert and oriented to person, place, and time  Skin: No rash noted  No erythema  Assessment/Plan:    Encounter for well adult exam with abnormal findings  Advice and education were given regarding nutrition, aerobic exercises, weight bearing exercises, cardiovascular risk reduction, fall risk reduction, and age appropriate supplements  The patient was counseled regarding instructions for management, risk factor reductions, prognosis, risks and benefits of treatment options, patient and family education, and importance of compliance with treatment           Chest pain on exertion  Symptomatic new diagnosis recurrent  patient does have multiple risk factor    Plan for stress echo ,disucss with patient important stop smoking     Viral upper respiratory tract infection  Acute symptomatic discussed with the patient supportive treatment include well hydration InCase of fever decrease in oral intake to call the office    Tobacco abuse  A chronic patient on and off smoking she did stop smoking for while and now she restart again patient aware of the complication of the smoking she is not ready to quit smoking yet       Diagnoses and all orders for this visit:    Encounter for well adult exam with abnormal findings    Chest pain on exertion  -     Echo stress test w contrast if indicated; Future    Viral upper respiratory tract infection    Status post Dale-en-y surgery   -     pantoprazole (PROTONIX) 40 mg tablet; Take 1 tablet (40 mg total) by mouth daily    Chronic hypertension  -     amLODIPine (NORVASC) 5 mg tablet; Take 1 tablet (5 mg total) by mouth daily  -     Echo stress test w contrast if indicated;  Future    Tobacco abuse

## 2020-01-09 PROBLEM — R07.9 CHEST PAIN ON EXERTION: Status: ACTIVE | Noted: 2020-01-09

## 2020-01-09 PROBLEM — J06.9 VIRAL UPPER RESPIRATORY TRACT INFECTION: Status: ACTIVE | Noted: 2020-01-09

## 2020-01-09 NOTE — ASSESSMENT & PLAN NOTE
Symptomatic new diagnosis recurrent  patient does have multiple risk factor    Plan for stress echo ,disucss with patient important stop smoking

## 2020-01-09 NOTE — ASSESSMENT & PLAN NOTE
Acute symptomatic discussed with the patient supportive treatment include well hydration InCase of fever decrease in oral intake to call the office

## 2020-01-10 NOTE — ASSESSMENT & PLAN NOTE
A chronic patient on and off smoking she did stop smoking for while and now she restart again patient aware of the complication of the smoking she is not ready to quit smoking yet

## 2020-02-11 DIAGNOSIS — I10 BENIGN ESSENTIAL HYPERTENSION: ICD-10-CM

## 2020-02-11 DIAGNOSIS — Z98.84 BARIATRIC SURGERY STATUS: ICD-10-CM

## 2020-02-11 RX ORDER — LOSARTAN POTASSIUM 100 MG/1
100 TABLET ORAL DAILY
Qty: 30 TABLET | Refills: 2 | Status: SHIPPED | OUTPATIENT
Start: 2020-02-11 | End: 2020-05-15 | Stop reason: SDUPTHER

## 2020-02-11 RX ORDER — PANTOPRAZOLE SODIUM 40 MG/1
40 TABLET, DELAYED RELEASE ORAL DAILY
Qty: 30 TABLET | Refills: 2 | Status: SHIPPED | OUTPATIENT
Start: 2020-02-11 | End: 2020-03-13 | Stop reason: ALTCHOICE

## 2020-03-13 ENCOUNTER — OFFICE VISIT (OUTPATIENT)
Dept: BARIATRICS | Facility: CLINIC | Age: 30
End: 2020-03-13
Payer: COMMERCIAL

## 2020-03-13 VITALS
HEIGHT: 69 IN | DIASTOLIC BLOOD PRESSURE: 70 MMHG | SYSTOLIC BLOOD PRESSURE: 122 MMHG | TEMPERATURE: 97.5 F | BODY MASS INDEX: 29.99 KG/M2 | HEART RATE: 95 BPM | WEIGHT: 202.5 LBS

## 2020-03-13 DIAGNOSIS — I10 BENIGN ESSENTIAL HYPERTENSION: ICD-10-CM

## 2020-03-13 DIAGNOSIS — E66.3 OVERWEIGHT: Primary | ICD-10-CM

## 2020-03-13 DIAGNOSIS — Z48.815 ENCOUNTER FOR SURGICAL AFTERCARE FOLLOWING SURGERY OF DIGESTIVE SYSTEM: ICD-10-CM

## 2020-03-13 DIAGNOSIS — R94.31 PROLONGED Q-T INTERVAL ON ECG: ICD-10-CM

## 2020-03-13 DIAGNOSIS — K91.2 POSTSURGICAL MALABSORPTION: ICD-10-CM

## 2020-03-13 DIAGNOSIS — R12 HEARTBURN: ICD-10-CM

## 2020-03-13 DIAGNOSIS — G47.33 OSA (OBSTRUCTIVE SLEEP APNEA): ICD-10-CM

## 2020-03-13 DIAGNOSIS — Z98.84 BARIATRIC SURGERY STATUS: ICD-10-CM

## 2020-03-13 DIAGNOSIS — R07.9 CHEST PAIN ON EXERTION: ICD-10-CM

## 2020-03-13 PROBLEM — Z72.0 TOBACCO ABUSE: Status: RESOLVED | Noted: 2018-12-23 | Resolved: 2020-03-13

## 2020-03-13 PROCEDURE — 99214 OFFICE O/P EST MOD 30 MIN: CPT | Performed by: PHYSICIAN ASSISTANT

## 2020-03-13 PROCEDURE — 3078F DIAST BP <80 MM HG: CPT | Performed by: PHYSICIAN ASSISTANT

## 2020-03-13 PROCEDURE — 3074F SYST BP LT 130 MM HG: CPT | Performed by: PHYSICIAN ASSISTANT

## 2020-03-13 PROCEDURE — 4004F PT TOBACCO SCREEN RCVD TLK: CPT | Performed by: PHYSICIAN ASSISTANT

## 2020-03-13 RX ORDER — FAMOTIDINE 20 MG/1
20 TABLET, FILM COATED ORAL 2 TIMES DAILY
Qty: 180 TABLET | Refills: 2 | Status: SHIPPED | OUTPATIENT
Start: 2020-03-13 | End: 2020-05-15 | Stop reason: SDUPTHER

## 2020-03-13 RX ORDER — DIPHENOXYLATE HYDROCHLORIDE AND ATROPINE SULFATE 2.5; .025 MG/1; MG/1
1 TABLET ORAL DAILY
COMMUNITY

## 2020-03-13 NOTE — ASSESSMENT & PLAN NOTE
Malabsorption- patient is at risk for malabsorption of vitamins/minerals secondary to malabsorption from her procedure and restriction of intakes  Reviewed current supplements and advised on same    She is taking procare mvi with probiotic with 32 mg of iron and takes a 45 mg of bariatric iron  And 3-500 mg calcium citrate with D-she is due for routine labs-will order now

## 2020-03-13 NOTE — PROGRESS NOTES
Assessment/Plan:    Overweight  Improved from obesity ii at her first post-op visit with her surgeon to current overweight with bmi of 29 9    Encounter for surgical aftercare following surgery of digestive system  -s/p Dale-En-Y Gastric Bypass with Dr Hector Anderson on 12/3/2019  Overall doing Well  Initial: 242 2 lb  Current:202 5 lb  EWL: 55%  Ilan:Current  Current BMI is Body mass index is 29 9 kg/m²  Tolerating a regular diet-yes  Eating at least 60 grams of protein per day-yes  Following 30/60 minute rule with liquids-yes  Drinking at least 64 ounces of fluid per day-yes  Drinking carbonated beverages-no  Sufficient exercise-yes  Using NSAIDs regularly-no  Using nicotine-no-regular   Using alcohol-yes/tried vodka and advised her on effect and encouraged to abstain      Postsurgical malabsorption  Malabsorption- patient is at risk for malabsorption of vitamins/minerals secondary to malabsorption from her procedure and restriction of intakes  Reviewed current supplements and advised on same    She is taking procare mvi with probiotic with 32 mg of iron and takes a 45 mg of bariatric iron  And 3-500 mg calcium citrate with D-she is due for routine labs-will order now    EAMON (obstructive sleep apnea)  She reports she had mild sleep study -she reports she did not require a Cpap/ she notes she still does snore, denies morning or mid-day fatigue    Advised she MAY benefit from repeat sleep study at the year to assure resolution    Benign essential hypertension  Controlled on BP medication  Advised that with continued weight loss blood pressure can come down  Advised on symptoms of hypotension and if this occurs to follow-up with PCP for further management since blood pressure medications may need to be adjusted at that time  Chest pain on exertion  She notes she gets some chest discomfort with exertion-she notes for one minute or less at a time   She was advised by PCP in January 2020 to get a stress echo-encouraged her to follow-up with this now-advised if symptoms last > 10 minutes to seek medical attention/911  She also had mildly prolonged Qtc on pre-op EKG as per cardiologist's evaluation    Heartburn  Overall controlled on ppi-continue same for now    Prolonged Q-T interval on ECG  Since she was found to have mildly prolonged Qtc on pre-op cariology evaluation -advised her to get stress echo as advised by PCP and relay this to them as well  I am currently switching her off pantoprazole to famotidine twice a day now to avoid med-med interaction with her lexapro       Diagnoses and all orders for this visit:    Overweight    Encounter for surgical aftercare following surgery of digestive system    EAMON (obstructive sleep apnea)    Benign essential hypertension    Chest pain on exertion    Postsurgical malabsorption    Heartburn  -     famotidine (PEPCID) 20 mg tablet; Take 1 tablet (20 mg total) by mouth 2 (two) times a day    Bariatric surgery status  -     famotidine (PEPCID) 20 mg tablet; Take 1 tablet (20 mg total) by mouth 2 (two) times a day    Prolonged Q-T interval on ECG    Other orders  -     Calcium-Vitamin D-Vitamin K (CALCIUM SOFT CHEWS PO); Take by mouth  -     multivitamin (THERAGRAN) TABS; Take 1 tablet by mouth daily  -     Iron Carbonyl-Vitamin C-FOS (Chewable Iron) 30-10-25 MG CHEW; Chew          Subjective:      Patient ID: Tera Jefferson is a 30 y o  female  She is here in routine post-op bariatric surgery follow-up to assess diet, weight and vitamin/mineral intakes and status  She is tolerating a regular diet  She is taking bariatric supplements and is exercising  She notes intermittent chest pains with radiation down both arms for one minute or less with exertion  She was noted to have a mildly prolonged Qtc of less than 500 milliseconds per cardiac evaluation pre-op  She was advised to get stress echo by her PCP back in January of this year as well but has not had this done   She denies any chest pain for longer durations      The following portions of the patient's history were reviewed and updated as appropriate: allergies, current medications, past family history, past medical history, past social history, past surgical history and problem list     Review of Systems   Constitutional: Negative for chills and fever  Unexpected weight change: planned weight loss  Respiratory: Negative for shortness of breath and wheezing  Cardiovascular: Negative for palpitations  No active chest pain-see discussion   Gastrointestinal: Negative for abdominal pain, diarrhea, nausea and vomiting  Prone to constipation-moves bowels well with miralax   Psychiatric/Behavioral: Suicidal ideas: no complait of anxiety or depression  Objective:      /70 (BP Location: Left arm, Patient Position: Sitting)   Pulse 95   Temp 97 5 °F (36 4 °C) (Tympanic)   Ht 5' 9" (1 753 m)   Wt 91 9 kg (202 lb 8 oz)   BMI 29 90 kg/m²          Physical Exam   Constitutional: She is oriented to person, place, and time  She appears well-developed and well-nourished  HENT:   Mouth/Throat: Oropharynx is clear and moist    Eyes: Conjunctivae are normal  No scleral icterus  Cardiovascular: Normal rate, regular rhythm and normal heart sounds  Pulmonary/Chest: Effort normal and breath sounds normal    Abdominal: Soft  There is no tenderness  No incisional hernias appreciated   Musculoskeletal:   Normal gait   Neurological: She is alert and oriented to person, place, and time  Skin: Skin is warm and dry  Psychiatric: She has a normal mood and affect  Nursing note and vitals reviewed  GOALS: Continued weight loss with good nutrition intakes    Normal vitamin and mineral levels  Exercise as tolerated    BARRIERS: none identified

## 2020-03-13 NOTE — ASSESSMENT & PLAN NOTE
She reports she had mild sleep study -she reports she did not require a Cpap/ she notes she still does snore, denies morning or mid-day fatigue    Advised she MAY benefit from repeat sleep study at the year to assure resolution

## 2020-03-13 NOTE — ASSESSMENT & PLAN NOTE
-s/p Dale-En-Y Gastric Bypass with Dr Claudell Payment on 12/3/2019  Overall doing Well  Initial: 242 2 lb  Current:202 5 lb  EWL: 55%  Ilan:Current  Current BMI is Body mass index is 29 9 kg/m²      Tolerating a regular diet-yes  Eating at least 60 grams of protein per day-yes  Following 30/60 minute rule with liquids-yes  Drinking at least 64 ounces of fluid per day-yes  Drinking carbonated beverages-no  Sufficient exercise-yes  Using NSAIDs regularly-no  Using nicotine-no-regular   Using alcohol-yes/tried vodka and advised her on effect and encouraged to abstain

## 2020-03-13 NOTE — ASSESSMENT & PLAN NOTE
She notes she gets some chest discomfort with exertion-she notes for one minute or less at a time   She was advised by PCP in January 2020 to get a stress echo-encouraged her to follow-up with this now-advised if symptoms last > 10 minutes to seek medical attention/911  She also had mildly prolonged Qtc on pre-op EKG as per cardiologist's evaluation

## 2020-03-13 NOTE — ASSESSMENT & PLAN NOTE
Controlled on BP medication  Advised that with continued weight loss blood pressure can come down  Advised on symptoms of hypotension and if this occurs to follow-up with PCP for further management since blood pressure medications may need to be adjusted at that time

## 2020-03-13 NOTE — PATIENT INSTRUCTIONS
It was great to meet you today  Please get stress echo test done as advised by your primary care provider  It is VERY IMPORTANT that you relay to every provider about your mildly prolonged QTC on EKG-since medications can make this EKG change worse which could be life-altering  I just placed order for you to have famotidine (pepcid 20 mg twice a day instead of pantoprazole now) which should be even safer than the pantoprazole    Follow-up in 3 months  We kindly ask that you arrive 15 minutes before your scheduled appointment time with your provider to allow for our staff to room you and check your vital signs and update your chart  We thank you for your patience at your visit  Follow diet as discussed  Get lab work done prior to next office visit  It is recommended to check with your insurance BEFORE getting labs done to make sure they are covered by your policy  Make sure to HOLD any multivitamins that may contain biotin and any biotin supplements FOR 5 DAYS before any labs since it can affect the results  IMPORTANT if you have a [facility] "[employer]" account, you will receive a letter of your vitamin/mineral results through the computer  Please watch for an update to your chart-since recommendations for supplement adjustments will be sent to you this way  Follow vitamin  and mineral recommendations as reviewed with you  Bariatric vitamins are highly recommended  Vitamins are important for a life-time to avoid low levels which can lead to other medical problems  Exercise as tolerated  Call our office if you have any problems with abdominal pain especially associated with fever, chills, nausea, vomiting or any other concerns  All  Post-bariatric surgery patients should be aware that very small quantities of any alcohol  can cause impairment and it is very possible not to feel the effect  The effect can be in the system for several hours  It is also a stomach irritant       It is advised to AVOID alcohol, Nonsteroidal antiinflammatory drugs (NSAIDS) and nicotine of all forms   Any of these can cause stomach irritation/pain  Recommendation : Most, if not all post-gastric surgery patients would benefit from having a regular counselor for at least 2 years post-op to help with need for multiple life-style changes  If you want to do this and need help finding a regular counselor you can also make a follow-up with our  to help you find one

## 2020-03-13 NOTE — ASSESSMENT & PLAN NOTE
Improved from obesity ii at her first post-op visit with her surgeon to current overweight with bmi of 29 9

## 2020-03-13 NOTE — ASSESSMENT & PLAN NOTE
Since she was found to have mildly prolonged Qtc on pre-op cariology evaluation "less than 500 milliseconds and likely secondary to lexapro" per report -advised her to get stress echo as advised by PCP and relay this to them as well  I am currently switching her off pantoprazole to famotidine twice a day now to avoid med-med interaction with her lexapro

## 2020-04-14 ENCOUNTER — TRANSCRIBE ORDERS (OUTPATIENT)
Dept: LAB | Facility: HOSPITAL | Age: 30
End: 2020-04-14

## 2020-04-14 DIAGNOSIS — C00.0: Primary | ICD-10-CM

## 2020-05-15 ENCOUNTER — OFFICE VISIT (OUTPATIENT)
Dept: FAMILY MEDICINE CLINIC | Facility: CLINIC | Age: 30
End: 2020-05-15
Payer: COMMERCIAL

## 2020-05-15 VITALS
HEART RATE: 95 BPM | SYSTOLIC BLOOD PRESSURE: 110 MMHG | HEIGHT: 68 IN | DIASTOLIC BLOOD PRESSURE: 80 MMHG | TEMPERATURE: 98.5 F | OXYGEN SATURATION: 95 % | WEIGHT: 188 LBS | BODY MASS INDEX: 28.49 KG/M2

## 2020-05-15 DIAGNOSIS — R12 HEARTBURN: ICD-10-CM

## 2020-05-15 DIAGNOSIS — Z98.84 BARIATRIC SURGERY STATUS: ICD-10-CM

## 2020-05-15 DIAGNOSIS — F33.1 MODERATE EPISODE OF RECURRENT MAJOR DEPRESSIVE DISORDER (HCC): ICD-10-CM

## 2020-05-15 DIAGNOSIS — I83.892 VARICOSE VEINS OF LEFT LOWER EXTREMITY WITH OTHER COMPLICATIONS: Primary | ICD-10-CM

## 2020-05-15 DIAGNOSIS — I10 BENIGN ESSENTIAL HYPERTENSION: ICD-10-CM

## 2020-05-15 PROBLEM — J01.00 ACUTE NON-RECURRENT MAXILLARY SINUSITIS: Status: RESOLVED | Noted: 2019-04-10 | Resolved: 2020-05-15

## 2020-05-15 PROBLEM — J06.9 VIRAL UPPER RESPIRATORY TRACT INFECTION: Status: RESOLVED | Noted: 2020-01-09 | Resolved: 2020-05-15

## 2020-05-15 PROCEDURE — 1036F TOBACCO NON-USER: CPT | Performed by: FAMILY MEDICINE

## 2020-05-15 PROCEDURE — 3074F SYST BP LT 130 MM HG: CPT | Performed by: FAMILY MEDICINE

## 2020-05-15 PROCEDURE — 3079F DIAST BP 80-89 MM HG: CPT | Performed by: FAMILY MEDICINE

## 2020-05-15 PROCEDURE — 99214 OFFICE O/P EST MOD 30 MIN: CPT | Performed by: FAMILY MEDICINE

## 2020-05-15 RX ORDER — ESCITALOPRAM OXALATE 20 MG/1
20 TABLET ORAL DAILY
Qty: 30 TABLET | Refills: 1 | Status: SHIPPED | OUTPATIENT
Start: 2020-05-15 | End: 2021-06-16 | Stop reason: ALTCHOICE

## 2020-05-15 RX ORDER — FAMOTIDINE 20 MG/1
20 TABLET, FILM COATED ORAL 2 TIMES DAILY
Qty: 180 TABLET | Refills: 2 | Status: SHIPPED | OUTPATIENT
Start: 2020-05-15 | End: 2021-06-16 | Stop reason: ALTCHOICE

## 2020-05-15 RX ORDER — LOSARTAN POTASSIUM 100 MG/1
100 TABLET ORAL DAILY
Qty: 30 TABLET | Refills: 2 | Status: SHIPPED | OUTPATIENT
Start: 2020-05-15

## 2020-05-17 PROBLEM — I83.892 VARICOSE VEINS OF LEFT LOWER EXTREMITY WITH OTHER COMPLICATIONS: Status: ACTIVE | Noted: 2020-05-15

## 2020-05-17 PROBLEM — I83.892 VARICOSE VEINS OF LEFT LOWER EXTREMITY WITH OTHER COMPLICATIONS: Status: ACTIVE | Noted: 2020-05-17

## 2020-06-30 ENCOUNTER — CONSULT (OUTPATIENT)
Dept: VASCULAR SURGERY | Facility: CLINIC | Age: 30
End: 2020-06-30
Payer: COMMERCIAL

## 2020-06-30 VITALS
RESPIRATION RATE: 16 BRPM | WEIGHT: 181 LBS | HEIGHT: 69 IN | BODY MASS INDEX: 26.81 KG/M2 | TEMPERATURE: 99 F | SYSTOLIC BLOOD PRESSURE: 140 MMHG | HEART RATE: 90 BPM | DIASTOLIC BLOOD PRESSURE: 90 MMHG

## 2020-06-30 DIAGNOSIS — I83.892 VARICOSE VEINS OF LEFT LOWER EXTREMITY WITH OTHER COMPLICATIONS: Primary | ICD-10-CM

## 2020-06-30 PROCEDURE — 99243 OFF/OP CNSLTJ NEW/EST LOW 30: CPT | Performed by: NURSE PRACTITIONER

## 2020-09-04 ENCOUNTER — TELEMEDICINE (OUTPATIENT)
Dept: FAMILY MEDICINE CLINIC | Facility: CLINIC | Age: 30
End: 2020-09-04
Payer: COMMERCIAL

## 2020-09-04 VITALS — HEART RATE: 103 BPM | TEMPERATURE: 98.9 F

## 2020-09-04 DIAGNOSIS — J02.9 SORE THROAT: ICD-10-CM

## 2020-09-04 DIAGNOSIS — J02.9 SORE THROAT: Primary | ICD-10-CM

## 2020-09-04 PROCEDURE — 99214 OFFICE O/P EST MOD 30 MIN: CPT | Performed by: FAMILY MEDICINE

## 2020-09-04 PROCEDURE — U0003 INFECTIOUS AGENT DETECTION BY NUCLEIC ACID (DNA OR RNA); SEVERE ACUTE RESPIRATORY SYNDROME CORONAVIRUS 2 (SARS-COV-2) (CORONAVIRUS DISEASE [COVID-19]), AMPLIFIED PROBE TECHNIQUE, MAKING USE OF HIGH THROUGHPUT TECHNOLOGIES AS DESCRIBED BY CMS-2020-01-R: HCPCS | Performed by: FAMILY MEDICINE

## 2020-09-04 NOTE — ASSESSMENT & PLAN NOTE
Acute symptomatic with headache body ache chills and the patient work in healthcare on contact with the patient in so she is not aware if she had anybody positive plan to test patient for COVID-19 recommend to go home no work until we get the result of the test patient will wear mask all the time and the keep 6 feet distance from family member In[place] symptom get worse to call the office

## 2020-09-04 NOTE — PROGRESS NOTES
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 if she had anybody positive plan to test patient for COVID-19 recommend to go home no work until we get the result of the test patient will wear mask all the time and the keep 6 feet distance from family member In[employer] symptom get worse to call the office         Relevant Orders    Novel Coronavirus (COVID-19), PCR [facility] - Collected at [facility] or [facility]        This virtual check-in was done via [employer] and patient was informed that this is not a secure, HIPAA-complaint platform  She agrees to proceed     Disposition:      I referred Nisha Jung to one of our centralized sites for a COVID-19 swab    I spent 15 minutes directly with the patient during this visit    Encounter provider Rut Castro MD    Provider located at [facility] AT [place] [address]    Recent Visits  No visits were found meeting these conditions  Showing recent visits within past 7 days and meeting all other requirements     Today's Visits  Date Type Provider Dept   09/04/20 Bob Cutler MD [facility]   Showing today's visits and meeting all other requirements     Future Appointments  No visits were found meeting these conditions  Showing future appointments within next 150 days and meeting all other requirements        Patient agrees to participate in a virtual check in via telephone or video visit instead of presenting to the office to address urgent/immediate medical needs  Patient is aware this is a billable service  After connecting through [employer], the patient was identified by name and date of birth   Mitra Mendez was informed that this was a telemedicine visit and that the exam was being conducted confidentially over secure lines  My office door was closed  No one else was in the room  Ayan Steinberg acknowledged consent and understanding of privacy and security of the telemedicine visit  I informed the patient that I have reviewed her record in Epic and presented the opportunity for her to ask any questions regarding the visit today  The patient agreed to participate  Subjective  Ayan Steinberg is a 26 y o  female who is concerned about COVID-19  She reports fever, chills, headache, sore throat and fatigue  She has not experienced cough, shortness of breath, abdominal pain, diarrhea, nausea and vomiting She has not had contact with a person who is under investigation for or who is positive for COVID-19 within the last 14 days  She has not been hospitalized recently for fever and/or lower respiratory symptoms      Past Medical History:   Diagnosis Date    Arthritis     Bariatric surgery status     Chronic kidney disease     Depression     Heartburn     Hypertension     Kidney stone     Obesity     Postsurgical malabsorption     Seasonal allergies     Sleep apnea     No cpap    Wears glasses        Past Surgical History:   Procedure Laterality Date    EGD      PA LAP GASTRIC BYPASS/MILTON-EN-Y N/A 12/3/2019    Procedure: ROBOTIC BYPASS GASTRIC MILTON-EN-Y, intraop egd;  Surgeon: Mick Schwab MD;  Location: [facility];  Service: Bariatrics       Current Outpatient Medications   Medication Sig Dispense Refill    acetaminophen (TYLENOL) 325 mg tablet Take 650 mg by mouth every 6 (six) hours as needed for mild pain      Calcium-Vitamin D-Vitamin K (CALCIUM SOFT CHEWS PO) Take by mouth      famotidine (PEPCID) 20 mg tablet Take 1 tablet (20 mg total) by mouth 2 (two) times a day 180 tablet 2    fluticasone (FLONASE) 50 mcg/act nasal spray 2 sprays into each nostril daily 1 Bottle 2    Iron Carbonyl-Vitamin C-FOS (Chewable Iron) 30-10-25 MG CHEW Chew      losartan (Cozaar) 100 MG tablet Take 1 tablet (100 mg total) by mouth daily 30 tablet 2    mometasone (NASONEX) 50 mcg/act nasal spray 2 sprays into each nostril as needed       multivitamin (THERAGRAN) TABS Take 1 tablet by mouth daily      escitalopram (LEXAPRO) 20 mg tablet Take 1 tablet (20 mg total) by mouth daily (Patient not taking: Reported on 9/4/2020) 30 tablet 1     No current facility-administered medications for this visit  No Known Allergies    Review of Systems   Constitutional: Positive for chills, fatigue and fever  Negative for activity change and appetite change  HENT: Positive for sore throat  Negative for congestion, ear pain, sinus pressure and sinus pain  Eyes: Negative for pain, discharge, redness and itching  Respiratory: Negative for cough, chest tightness, shortness of breath and stridor  Cardiovascular: Negative for chest pain, palpitations and leg swelling  Gastrointestinal: Negative for abdominal pain, blood in stool, constipation, diarrhea and nausea  Genitourinary: Negative for dysuria, flank pain, frequency and hematuria  Musculoskeletal: Negative for back pain, joint swelling and neck pain  Skin: Negative for pallor and rash  Neurological: Positive for headaches  Negative for dizziness, tremors, weakness and numbness  Hematological: Does not bruise/bleed easily  Video Exam    Vitals:    09/04/20 0920   Pulse: 103   Temp: 98 9 °F (37 2 °C)         Janice appears no distress  Physical Exam  Constitutional:       General: She is not in acute distress  Appearance: She is not diaphoretic  HENT:      Head: Normocephalic and atraumatic  Nose: Nose normal    Eyes:      General: No scleral icterus  Right eye: No discharge  Left eye: No discharge  Neck:      Musculoskeletal: Normal range of motion  Pulmonary:      Effort: Pulmonary effort is normal  No respiratory distress     Abdominal: General: There is no distension  Musculoskeletal: Normal range of motion  Skin:     Findings: No rash  Neurological:      Mental Status: She is alert and oriented to person, place, and time  VIRTUAL VISIT DISCLAIMER    Jeannette Ahn acknowledges that she has consented to an online visit or consultation  She understands that the online visit is based solely on information provided by her, and that, in the absence of a face-to-face physical evaluation by the physician, the diagnosis she receives is both limited and provisional in terms of accuracy and completeness  This is not intended to replace a full medical face-to-face evaluation by the physician  Jeannette Ahn understands and accepts these terms

## 2020-09-05 LAB — SARS-COV-2 RNA SPEC QL NAA+PROBE: NOT DETECTED

## 2020-09-09 ENCOUNTER — TELEPHONE (OUTPATIENT)
Dept: FAMILY MEDICINE CLINIC | Facility: CLINIC | Age: 30
End: 2020-09-09

## 2020-11-04 ENCOUNTER — HOSPITAL ENCOUNTER (OUTPATIENT)
Dept: NON INVASIVE DIAGNOSTICS | Facility: CLINIC | Age: 30
Discharge: HOME | End: 2020-11-04
Payer: COMMERCIAL

## 2020-11-04 ENCOUNTER — TRANSCRIBE ORDERS (OUTPATIENT)
Dept: NON INVASIVE DIAGNOSTICS | Facility: CLINIC | Age: 30
End: 2020-11-04

## 2020-11-04 DIAGNOSIS — I83.892 VARICOSE VEINS OF LEFT LOWER EXTREMITY WITH OTHER COMPLICATIONS: ICD-10-CM

## 2020-11-04 PROCEDURE — 93970 EXTREMITY STUDY: CPT

## 2020-11-05 PROCEDURE — 93970 EXTREMITY STUDY: CPT | Performed by: SURGERY

## 2020-11-25 ENCOUNTER — TELEPHONE (OUTPATIENT)
Dept: VASCULAR SURGERY | Facility: CLINIC | Age: 30
End: 2020-11-25

## 2020-12-18 ENCOUNTER — TELEPHONE (OUTPATIENT)
Dept: VASCULAR SURGERY | Facility: CLINIC | Age: 30
End: 2020-12-18

## 2020-12-21 ENCOUNTER — OFFICE VISIT (OUTPATIENT)
Dept: VASCULAR SURGERY | Facility: CLINIC | Age: 30
End: 2020-12-21
Payer: COMMERCIAL

## 2020-12-21 ENCOUNTER — TELEPHONE (OUTPATIENT)
Dept: ADMINISTRATIVE | Facility: HOSPITAL | Age: 30
End: 2020-12-21

## 2020-12-21 VITALS
DIASTOLIC BLOOD PRESSURE: 60 MMHG | WEIGHT: 171.4 LBS | HEART RATE: 58 BPM | SYSTOLIC BLOOD PRESSURE: 142 MMHG | TEMPERATURE: 99.6 F | HEIGHT: 68 IN | BODY MASS INDEX: 25.98 KG/M2

## 2020-12-21 DIAGNOSIS — I83.892 VARICOSE VEINS OF LEFT LOWER EXTREMITY WITH OTHER COMPLICATIONS: Primary | ICD-10-CM

## 2020-12-21 PROCEDURE — 99214 OFFICE O/P EST MOD 30 MIN: CPT | Performed by: SURGERY

## 2020-12-21 RX ORDER — CHLORHEXIDINE GLUCONATE 0.12 MG/ML
15 RINSE ORAL ONCE
Status: CANCELLED | OUTPATIENT
Start: 2020-12-21 | End: 2020-12-21

## 2020-12-21 NOTE — LETTER
RE: Existing Authorization Number [id number]    To whom it may concern:    I am writing regarding a date of service change on the existing authorization number [id number], for patient Chata Spence (1990)  At the patient's request, we have rescheduled her procedure for 07/16/21  Respectfully, we request the existing authorization be extended until 07/31/21 AND the date of service be changed to 07/16/21  Should you have any questions or concerns regarding the details of this case, please do not hesitate to reach out  Respectfully,      Kate Lomas  Prior Authorization & Referral    Vascular Center  Direct Line: [phone]  Direct Fax: [phone]  Otho [email or web address]  www [email or web address]

## 2020-12-21 NOTE — TELEPHONE ENCOUNTER
Check out staff:   REMINDER: Under Reason For Call, comments MUST be formatted as:   (Surgeon's Initials) / (Procedure)    PHYSICIAN / HOSPITAL: Esther Ayoub (NPI: [id number]) / Cefer[facility] (Tax: [id number] / NPI: [id number])    PROCEDURE: Left venous ablation and phlebectomy    LEVEL: 4    REP: No    ASSISTANT SURGEON: No    ALLERGIES: Patient has no known allergies  INSTRUCTIONS GIVEN: NO BOWEL PREP    BLOOD THINNERS / MED HOLD: Patient is not taking any blood thinners  HYDRATION REQUIRED: Patient does not require hydration  DIALYSIS: Patient is not on dialysis  *Please ROUTE this encounter to [facility] Surgery Coordinator   AND   Send COMPLETE CONSENT to Vascular Surgery Schedulers e-mail group*    I certify that patient has signed, printed, timed, and dated their surgery consent  I certify that BOTH sides of the completed surgery consent have been scanned into the patient's Epic chart by myself on 12/21/2020        *Please ROUTE this encounter to [facility] Clearance Pool   AND   Send CLEARANCE FORM(S) to Vascular Nursing e-mail group*       should have anesthesia evaluation preprocedure secondary to previous anesthesia reaction with severe hypertension**

## 2020-12-23 ENCOUNTER — IMMUNIZATIONS (OUTPATIENT)
Dept: FAMILY MEDICINE CLINIC | Facility: HOSPITAL | Age: 30
End: 2020-12-23
Payer: COMMERCIAL

## 2020-12-23 DIAGNOSIS — I83.892 VARICOSE VEINS OF LEFT LOWER EXTREMITY WITH OTHER COMPLICATIONS: Primary | ICD-10-CM

## 2020-12-23 DIAGNOSIS — Z23 ENCOUNTER FOR IMMUNIZATION: ICD-10-CM

## 2020-12-23 PROCEDURE — 91300 SARS-COV-2 / COVID-19 MRNA VACCINE (PFIZER-BIONTECH) 30 MCG: CPT

## 2020-12-23 PROCEDURE — 0001A SARS-COV-2 / COVID-19 MRNA VACCINE (PFIZER-BIONTECH) 30 MCG: CPT

## 2020-12-23 NOTE — TELEPHONE ENCOUNTER
Is patient requesting a call when authorization has been obtained? Patient would like to be called once an update is available  Surgery Date: 03/16/2021  Primary Surgeon: Reeta Kayser (NPI: [id number])  Assisting Surgeon: Not Applicable (N/A)  Facility: [facility] (Tax: [id number] / NPI: [id number])  Inpatient / Outpatient: Outpatient  Level: 4    Clearance Received: No clearance ordered  Consent Received: Yes, scanned into Epic on 12/21/20  Medication Hold / Last Dose: Not Applicable (N/A)  VQI Spreadsheet: Not Applicable (N/A)  IR Notified: Not Applicable (N/A)  Rep  Notified: Not Applicable (N/A)  Equipment Needs: Not Applicable (N/A)  Vas Lab Requested: 12/23/20  Patient Contacted: 12/23/20      Diagnosis: I83 [id number]  Procedure/ CPT Code(s): (EVLT) Endovenous Laser Treatment WITH Stab Phlebectomies of the left upper leg // CPT: 07174, 87803     For varicose vein related procedures, last LEVDR? 11/4/20, patient's Pina Elder was completed within 6-months of their procedure date  Post Operative Date/ Time: 03/18/21 , 830am Gera with ELVIN Reinoso (NPI: [id number]) duplex to follow at 9am     *Please review with patient med hold, PATs, and check H&P *  PATIENT WAS MAILED SURGERY/SHOWERING/DISCHARGE/COVID INSTRUCTIONS AFTER REVIEWING WITH HER VIA PHONE CALL  patient was mailed H&P form and discharge instructions w/her dates/time on them

## 2021-01-06 ENCOUNTER — TELEMEDICINE (OUTPATIENT)
Dept: FAMILY MEDICINE CLINIC | Facility: CLINIC | Age: 31
End: 2021-01-06
Payer: COMMERCIAL

## 2021-01-06 DIAGNOSIS — Z20.822 EXPOSURE TO COVID-19 VIRUS: Primary | ICD-10-CM

## 2021-01-06 PROCEDURE — 99213 OFFICE O/P EST LOW 20 MIN: CPT | Performed by: NURSE PRACTITIONER

## 2021-01-06 NOTE — PROGRESS NOTES
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 [employer] employee scheduled virtual appointment for covid-19 direct exposure  After speaking with patient she stated that her coworkers boyfriend tested positive for covid-19  The patient stated that she was in contact with her coworker and not the boyfriend  Patient reports that the coworker went for testing today  Educated patient that she is a 3rd party because she didn't have direct contact with a covid-19 positive individual and there is no recommendation for her to be tested  Patient denies symptoms  Recommend patient call back if coworker is positive and test to be ordered 5 days from last contact if patient continues to be asymptomatic  Patient to call if symptoms develop in the interim  Patient verbalized understanding              Disposition:     After clarifying the patient's history, my suspicion for COVID-19 infection is very low  I have spent 15 minutes directly with the patient  Greater than 50% of this time was spent in counseling/coordination of care regarding: instructions for management and impressions  Encounter provider NANCI Abarca    Provider located at [address]  [address] [facility] [address] [phone]    Recent Visits  No visits were found meeting these conditions  Showing recent visits within past 7 days and meeting all other requirements     Today's Visits  Date Type Provider Dept   01/06/21 Telemedicine Paty Wheeler, [address] today's visits and meeting all other requirements     Future Appointments  No visits were found meeting these conditions     Showing future appointments within next 150 days and meeting all other requirements      This virtual check-in was done via [address] and patient was informed that this is not a secure, HIPAA-compliant platform  She agrees to proceed  Patient agrees to participate in a virtual check in via telephone or video visit instead of presenting to the office to address urgent/immediate medical needs  Patient is aware this is a billable service  After connecting through [facility], the patient was identified by name and date of birth  Guru Vigil was informed that this was a telemedicine visit and that the exam was being conducted confidentially over secure lines  My office door was closed  No one else was in the room  Guru Vigil acknowledged consent and understanding of privacy and security of the telemedicine visit  I informed the patient that I have reviewed her record in Epic and presented the opportunity for her to ask any questions regarding the visit today  The patient agreed to participate  Subjective:   Guru Vigil is a 26 y o  female who is concerned about COVID-19  Patient is asymptomatic  Patient denies fever, chills, fatigue, malaise, congestion, rhinorrhea, sore throat, anosmia, loss of taste, cough, shortness of breath, chest tightness, abdominal pain, nausea, vomiting, diarrhea, myalgias and headaches       Exposure:   Contact with a person who is under investigation (PUI) for or who is positive for COVID-19 within the last 14 days?: No    Hospitalized recently for fever and/or lower respiratory symptoms?: No      Currently a healthcare worker that is involved in direct patient care?: Yes      Resident in a special setting where the risk of COVID-19 transmission may be high? (this may include long-term care, correctional and assisted facilities; homeless shelters; assisted-living facilities and group homes ): No      Lab Results   Component Value Date    SARSCOV2 Not Detected 09/04/2020     Past Medical History:   Diagnosis Date    Arthritis     Bariatric surgery status     Chronic kidney disease     Depression     Heartburn     Hypertension     Kidney stone     Obesity     Postsurgical malabsorption     Seasonal allergies     Sleep apnea     No cpap    Wears glasses      Past Surgical History:   Procedure Laterality Date    EGD      SD LAP GASTRIC BYPASS/MILTON-EN-Y N/A 12/3/2019    Procedure: ROBOTIC BYPASS GASTRIC MILTON-EN-Y, intraop egd;  Surgeon: Janine Love MD;  Location: [facility] OR;  Service: Bariatrics     Current Outpatient Medications   Medication Sig Dispense Refill    acetaminophen (TYLENOL) 325 mg tablet Take 650 mg by mouth every 6 (six) hours as needed for mild pain      Calcium-Vitamin D-Vitamin K (CALCIUM SOFT CHEWS PO) Take by mouth      escitalopram (LEXAPRO) 20 mg tablet Take 1 tablet (20 mg total) by mouth daily 30 tablet 1    famotidine (PEPCID) 20 mg tablet Take 1 tablet (20 mg total) by mouth 2 (two) times a day 180 tablet 2    fluticasone (FLONASE) 50 mcg/act nasal spray 2 sprays into each nostril daily 1 Bottle 2    Iron Carbonyl-Vitamin C-FOS (Chewable Iron) 30-10-25 MG CHEW Chew      losartan (Cozaar) 100 MG tablet Take 1 tablet (100 mg total) by mouth daily 30 tablet 2    mometasone (NASONEX) 50 mcg/act nasal spray 2 sprays into each nostril as needed       multivitamin (THERAGRAN) TABS Take 1 tablet by mouth daily       No current facility-administered medications for this visit  No Known Allergies    Review of Systems   Constitutional: Negative for activity change, appetite change, chills, fatigue and fever  HENT: Negative for congestion, postnasal drip, rhinorrhea, sneezing and sore throat  Respiratory: Negative for cough, chest tightness, shortness of breath and wheezing  Cardiovascular: Negative for chest pain and palpitations  Gastrointestinal: Negative for abdominal pain, constipation, diarrhea, nausea and vomiting  Musculoskeletal: Negative for arthralgias and myalgias  Skin: Negative for color change, pallor and rash  Neurological: Negative for dizziness, weakness, light-headedness and headaches  Hematological: Negative for adenopathy  Psychiatric/Behavioral: Negative for agitation and confusion  Objective: There were no vitals filed for this visit  Physical Exam  Vitals signs and nursing note reviewed  Constitutional:       General: She is not in acute distress  Appearance: Normal appearance  She is not ill-appearing, toxic-appearing or diaphoretic  HENT:      Head: Normocephalic and atraumatic  Nose: Nose normal  No congestion or rhinorrhea  Eyes:      General: No scleral icterus  Right eye: No discharge  Left eye: No discharge  Neck:      Musculoskeletal: Normal range of motion  Pulmonary:      Effort: Pulmonary effort is normal  No respiratory distress  Musculoskeletal: Normal range of motion  Skin:     Coloration: Skin is not jaundiced or pale  Findings: No bruising, erythema, lesion or rash  Neurological:      Mental Status: She is alert and oriented to person, place, and time  Psychiatric:         Mood and Affect: Mood normal          Behavior: Behavior normal          Thought Content: Thought content normal          Judgment: Judgment normal        VIRTUAL VISIT DISCLAIMER    Sunitha Weaver acknowledges that she has consented to an online visit or consultation  She understands that the online visit is based solely on information provided by her, and that, in the absence of a face-to-face physical evaluation by the physician, the diagnosis she receives is both limited and provisional in terms of accuracy and completeness  This is not intended to replace a full medical face-to-face evaluation by the physician  Sunitha Weaver understands and accepts these terms

## 2021-01-06 NOTE — ASSESSMENT & PLAN NOTE
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 scheduled virtual appointment for covid-19 direct exposure  After speaking with patient she stated that her coworkers boyfriend tested positive for covid-19  The patient stated that she was in contact with her coworker and not the boyfriend  Patient reports that the coworker went for testing today  Educated patient that she is a 3rd party because she didn't have direct contact with a covid-19 positive individual and there is no recommendation for her to be tested  Patient denies symptoms  Recommend patient call back if coworker is positive and test to be ordered 5 days from last contact if patient continues to be asymptomatic  Patient to call if symptoms develop in the interim   Patient verbalized understanding

## 2021-01-13 ENCOUNTER — IMMUNIZATIONS (OUTPATIENT)
Dept: FAMILY MEDICINE CLINIC | Facility: HOSPITAL | Age: 31
End: 2021-01-13

## 2021-01-13 DIAGNOSIS — Z23 ENCOUNTER FOR IMMUNIZATION: ICD-10-CM

## 2021-01-13 PROCEDURE — 0002A SARS-COV-2 / COVID-19 MRNA VACCINE (PFIZER-BIONTECH) 30 MCG: CPT

## 2021-01-13 PROCEDURE — 91300 SARS-COV-2 / COVID-19 MRNA VACCINE (PFIZER-BIONTECH) 30 MCG: CPT

## 2021-02-23 NOTE — TELEPHONE ENCOUNTER
Faxed authorization request for patient's procedure  [facility] prior to receiving [facility]  Please refer to Naye Chavez / [facility] number [id number] for case updates

## 2021-03-10 DIAGNOSIS — I83.892 VARICOSE VEINS OF LEFT LOWER EXTREMITY WITH OTHER COMPLICATIONS: Primary | ICD-10-CM

## 2021-03-10 NOTE — TELEPHONE ENCOUNTER
Pt called and requested her surgery be moved out to end of April or later  I offered her 5/18/21 in [facility]/OR and she accepted  She will have her blood work done 1 week prior at [facility]

## 2021-03-11 NOTE — TELEPHONE ENCOUNTER
Existing authorization is valid for the [address] [place] [address] until 06/14/21  As long as procedure remains at the same location before 06/14/21, no new authorization is required  For complete details, please refer to Horacio Sandoval / Mickie  number [id number]      Case Approved - Authorization Information     Authorization number [id number]   Start Date 03/16/21   End Date 06/14/21

## 2021-04-26 ENCOUNTER — TELEPHONE (OUTPATIENT)
Dept: FAMILY MEDICINE CLINIC | Facility: CLINIC | Age: 31
End: 2021-04-26

## 2021-05-12 ENCOUNTER — TELEPHONE (OUTPATIENT)
Dept: BARIATRICS | Facility: CLINIC | Age: 31
End: 2021-05-12

## 2021-05-12 NOTE — TELEPHONE ENCOUNTER
Spoke with patient to schedule OD ANNUAL  PT declined to schedule at this time, states will call the office back later to schedule as she is on another phone call at the moment

## 2021-05-12 NOTE — TELEPHONE ENCOUNTER
----- Message from Sean Prasad RN sent at 2021  5:34 AM EDT -----  Regardin month f/u appointment  Please contact patient to schedule a 1 year follow up appointment    Thank You

## 2021-05-14 NOTE — TELEPHONE ENCOUNTER
Received a message from preadmission testing stating when they spoke with the patient she told them she was planning to postpone this procedure  I called her and left her a message asking her to please call us back to confirm this  Her voicemail listed another number we could call to reach her  The number is [phone]  When I called this number I got a message that at the subscribers request the phone was not accepting calls  left knee, mitral valve replaced/Artificial joint/Lens implant/Heart valve

## 2021-05-17 NOTE — TELEPHONE ENCOUNTER
Received call from Rajendra García 27 that pt told them she is cancelling tomorrow due to a lot of family things going on  I called the pt and she confirmed this  I cancelled everything and s/w Glynn Beckham in the OR to cancel case  Pt is requesting a call back with a new date

## 2021-05-20 NOTE — TELEPHONE ENCOUNTER
Is patient requesting a call when authorization has been obtained? Patient did not request a call  Surgery Date: 7/16/21  Primary Surgeon: Ame Kaiser (NPI: [id number])  Assisting Surgeon: Not Applicable (N/A)  Facility: [facility] (Tax: [id number] / NPI: [id number])  Inpatient / Outpatient: Outpatient  Level: 4    Clearance Received: No clearance ordered  Consent Received: Yes, scanned into Epic on 12/21/20  Medication Hold / Last Dose: Not Applicable (N/A)  VQI Spreadsheet: Not Applicable (N/A)  IR Notified: Not Applicable (N/A)  Rep  Notified: Not Applicable (N/A)  Equipment Needs: Not Applicable (N/A)  [facility] Requested: 5/20/21  Patient Contacted: 5/20/21    Diagnosis: [id number]  Procedure/ CPT Code(s): (EVLT) Endovenous Laser Treatment WITH Stab Phlebectomies of the left upper leg // CPT: 59882, 87460     For varicose vein related procedures, last LEVDR? 11/4/20, patient's Satya Mccollumom was completed within 6-months of their procedure date  Post Operative Date/ Time: To Be Determined (TBD)     *Please review with patient med hold, PATs, and check H&P *  PATIENT WAS MAILED SURGERY/SHOWERING/DISCHARGE/COVID INSTRUCTIONS AFTER REVIEWING WITH HER VIA PHONE CALL  Patient was mailed H&P form and updated discharge instructions with post op dates

## 2021-06-03 NOTE — TELEPHONE ENCOUNTER
Authorization requirements reviewed  Please refer to [address] / Tahng Levo number [id number] for case updates

## 2021-06-16 ENCOUNTER — OFFICE VISIT (OUTPATIENT)
Dept: FAMILY MEDICINE CLINIC | Facility: CLINIC | Age: 31
End: 2021-06-16
Payer: COMMERCIAL

## 2021-06-16 VITALS
SYSTOLIC BLOOD PRESSURE: 140 MMHG | OXYGEN SATURATION: 99 % | HEART RATE: 80 BPM | WEIGHT: 172 LBS | BODY MASS INDEX: 25.48 KG/M2 | HEIGHT: 69 IN | DIASTOLIC BLOOD PRESSURE: 100 MMHG | TEMPERATURE: 99.4 F

## 2021-06-16 DIAGNOSIS — F41.9 ANXIETY AND DEPRESSION: ICD-10-CM

## 2021-06-16 DIAGNOSIS — F32.A ANXIETY AND DEPRESSION: ICD-10-CM

## 2021-06-16 DIAGNOSIS — R22.31 MASS OF RIGHT AXILLA: Primary | ICD-10-CM

## 2021-06-16 DIAGNOSIS — I10 BENIGN ESSENTIAL HYPERTENSION: ICD-10-CM

## 2021-06-16 PROCEDURE — 99214 OFFICE O/P EST MOD 30 MIN: CPT | Performed by: FAMILY MEDICINE

## 2021-06-16 RX ORDER — PAROXETINE 10 MG/1
10 TABLET, FILM COATED ORAL DAILY
Qty: 30 TABLET | Refills: 1 | Status: SHIPPED | OUTPATIENT
Start: 2021-06-16

## 2021-06-16 RX ORDER — ESCITALOPRAM OXALATE 20 MG/1
20 TABLET ORAL DAILY
Qty: 30 TABLET | Refills: 2 | Status: CANCELLED | OUTPATIENT
Start: 2021-06-16

## 2021-06-16 NOTE — PROGRESS NOTES
BMI Counseling: Body mass index is 25 77 kg/m²  The BMI is above normal  Nutrition recommendations include decreasing portion sizes, decreasing fast food intake and limiting drinks that contain sugar  Exercise recommendations include exercising 3-5 times per week  No pharmacotherapy was ordered  Subjective:   Chief Complaint   Patient presents with    depression and anxiety        Patient ID: Fina Monroy is a 33 y o  female  Patient is here with multiple concern   1- palpable lump in right axillary area notice it almost 1 w ago painful to touch no redness first time ,no weight change no fever no palpable lump in B/L breast    2- anxiety and depression ,patient known to HX of depression use to be on Expos she felt better decide to stop it while ago ,ptient recently lost her grand mother and having hard time to handle it affect her mood her happiness not sleeping well no suicidal attemtp or ideation  Patient vital sign today BP is elevated ,patient not taking med since her gastric bypass her blood pressure has beed doing good til recently with stress no chest pain no short of breath no palpitation or headache no blood in urine      The following portions of the patient's history were reviewed and updated as appropriate: allergies, current medications, past family history, past medical history, past social history, past surgical history and problem list     Review of Systems   Constitutional: Negative for activity change, appetite change, fatigue and fever  HENT: Negative for congestion, ear pain, sinus pressure, sinus pain and sore throat  Eyes: Negative for pain, discharge, redness and itching  Respiratory: Negative for cough, chest tightness, shortness of breath and stridor  Cardiovascular: Negative for chest pain, palpitations and leg swelling  Gastrointestinal: Negative for abdominal pain, blood in stool, constipation, diarrhea and nausea     Genitourinary: Negative for dysuria, flank pain, frequency and hematuria  Musculoskeletal: Negative for back pain, joint swelling and neck pain  Skin: Negative for pallor and rash  Lump in right axillary area   Neurological: Negative for dizziness, tremors, weakness, numbness and headaches  Hematological: Does not bruise/bleed easily  Psychiatric/Behavioral: Positive for agitation, decreased concentration and sleep disturbance  The patient is nervous/anxious  Objective:  Vitals:    06/16/21 1318   BP: 140/100   Pulse: 80   Temp: 99 4 °F (37 4 °C)   TempSrc: Tympanic   SpO2: 99%   Weight: 78 kg (172 lb)   Height: 5' 8 5" (1 74 m)      Physical Exam  Vitals and nursing note reviewed  Constitutional:       General: She is not in acute distress  Appearance: She is well-developed  She is not diaphoretic  HENT:      Head: Normocephalic  Right Ear: Tympanic membrane, ear canal and external ear normal       Left Ear: Tympanic membrane, ear canal and external ear normal       Nose: Nose normal  No congestion or rhinorrhea  Mouth/Throat:      Mouth: Mucous membranes are moist       Pharynx: Oropharynx is clear  No oropharyngeal exudate or posterior oropharyngeal erythema  Eyes:      General:         Right eye: No discharge  Left eye: No discharge  Conjunctiva/sclera: Conjunctivae normal       Pupils: Pupils are equal, round, and reactive to light  Neck:      Vascular: No JVD  Cardiovascular:      Rate and Rhythm: Normal rate and regular rhythm  Heart sounds: Normal heart sounds  No murmur heard  No gallop  Pulmonary:      Effort: Pulmonary effort is normal  No respiratory distress  Breath sounds: Normal breath sounds  No stridor  No wheezing or rales  Chest:      Chest wall: No tenderness  Abdominal:      General: There is no distension  Palpations: Abdomen is soft  There is no mass  Tenderness: There is no abdominal tenderness  There is no rebound     Musculoskeletal: General: No tenderness  Cervical back: Normal range of motion and neck supple  Lymphadenopathy:      Cervical: No cervical adenopathy  Skin:     General: Skin is warm  Findings: No erythema or rash  Neurological:      Mental Status: She is alert and oriented to person, place, and time  Motor: No weakness  Gait: Gait normal            Assessment/Plan:    Mass of right axilla   New diagnosis noticed more than 2 w ago ,with pain  Plan for US of lump ,avoid shaving against direction of hair    Anxiety and depression  New diagnosis patient has HX of depression but patient has Anxious mode her NELLY score was 20 patient use to be on Lexapro and feel is not helping anymore   Plan stop Lexapro an to start Paxil 10 mg po/day ,proper use med and possible side effect discuss with patient    BMI 25 0-25 9,adult  Improve s/p Gastric bypass continue current Bariatric diet     Benign essential hypertension  Chronic uncontrol asymptomatic ,patient is not taking Losartan since her gastric bypass   Per patient her blood pressure been well controled ,but lately going through stress and not been  Strict with her diet         Diagnoses and all orders for this visit:    Mass of right axilla  -     US extremity soft tissue; Future    Anxiety and depression  -     PARoxetine (PAXIL) 10 mg tablet; Take 1 tablet (10 mg total) by mouth daily  -     CBC and differential; Future  -     Comprehensive metabolic panel; Future  -     Lipid panel; Future  -     TSH, 3rd generation with Free T4 reflex; Future  -     Iron; Future  -     Vitamin B12; Future  -     Folate; Future    Benign essential hypertension  -     CBC and differential; Future  -     Comprehensive metabolic panel; Future  -     Lipid panel; Future  -     TSH, 3rd generation with Free T4 reflex; Future  -     Iron; Future  -     Vitamin B12; Future  -     Folate;  Future    BMI 25 0-25 9,adult  -     CBC and differential; Future  -     Comprehensive metabolic panel; Future  -     Lipid panel; Future  -     TSH, 3rd generation with Free T4 reflex; Future  -     Iron; Future  -     Vitamin B12; Future  -     Folate; Future    Other orders  -     Cancel: escitalopram (LEXAPRO) 20 mg tablet;  Take 1 tablet (20 mg total) by mouth daily

## 2021-06-17 NOTE — ASSESSMENT & PLAN NOTE
New diagnosis noticed more than 2 w ago ,with pain  Plan for US of lump ,avoid shaving against direction of hair

## 2021-06-17 NOTE — ASSESSMENT & PLAN NOTE
New diagnosis patient has HX of depression but patient has Anxious mode her NELLY score was 20 patient use to be on Lexapro and feel is not helping anymore   Plan stop Lexapro an to start Paxil 10 mg po/day ,proper use med and possible side effect discuss with patient

## 2021-07-14 NOTE — TELEPHONE ENCOUNTER
Received a message from HALEY stating pt would be calling to cancel her surgery for Friday  I called the pt and she said she just can't do it right now and will be calling in the Fall to reschedule

## 2021-07-15 NOTE — TELEPHONE ENCOUNTER
Patient will need a new LEVDR and we need to check with Dr Ezequiel Brian about an office visit   Last seen by Dr Ezequiel Brian 12/21/2020

## 2021-09-24 ENCOUNTER — TELEMEDICINE (OUTPATIENT)
Dept: FAMILY MEDICINE CLINIC | Facility: CLINIC | Age: 31
End: 2021-09-24
Payer: COMMERCIAL

## 2021-09-24 VITALS — TEMPERATURE: 98 F | OXYGEN SATURATION: 98 %

## 2021-09-24 DIAGNOSIS — R05.9 COUGH: ICD-10-CM

## 2021-09-24 DIAGNOSIS — Z92.29 COVID-19 VACCINE SERIES COMPLETED: ICD-10-CM

## 2021-09-24 DIAGNOSIS — R09.81 NASAL CONGESTION: Primary | ICD-10-CM

## 2021-09-24 PROCEDURE — 0241U HB NFCT DS VIR RESP RNA 4 TRGT: CPT | Performed by: NURSE PRACTITIONER

## 2021-09-24 PROCEDURE — 99214 OFFICE O/P EST MOD 30 MIN: CPT | Performed by: NURSE PRACTITIONER

## 2021-09-24 NOTE — PROGRESS NOTES
COVID-19 Outpatient Progress Note    Assessment/Plan:    Problem List Items Addressed This Visit        Other    Nasal congestion - Primary     Acute symptomatic patient started with the nasal congestion this morning  Children are currently sick as well  Associated symptoms include, sneezing, headache, diarrhea, cough, and loss of smell  Patient denies known COVID contact  Patient has received COVID vaccine series  Will recommend increase fluids, COVID vitamins, and testing sent to the lab for RSV flu and COVID  Patient call with worsening of symptoms         Relevant Orders    COVID19, Influenza A/B, RSV PCR, [facility] - Collected in Office    Cough     Acute symptomatic patient started with cough this morning 09/24/2021  Patient denies known COVID contact  Associated symptoms include nasal congestion, sneezing, headache, diarrhea, and loss of smell  Will recommend increase fluids, COVID vitamins, and testing sent to lab for RSV flu and COVID  Relevant Orders    COVID19, Influenza A/B, RSV PCR, [facility] - Collected in Office    COVID-19 vaccine series completed     [facility] vaccine series completed  Last injection given 1/13/2021  Patient currently symptomatic and with increase of covid variants will recommend testing  Disposition:     I recommended the patient to come to our office to perform PCR testing for COVID-19  I have spent 15 minutes directly with the patient  Greater than 50% of this time was spent in counseling/coordination of care regarding: instructions for management and patient and family education          Verification of patient location:    Patient is located in the following state in which I hold an active license [place]    Encounter provider Saji Baxter    Provider located at [facility] AT [address] [address] [phone]    Recent Visits  No visits were found meeting these conditions  Showing recent visits within past 7 days and meeting all other requirements  Today's Visits  Date Type Provider Dept   09/24/21 Telemedicine Paty [address], [address] today's visits and meeting all other requirements  Future Appointments  No visits were found meeting these conditions  Showing future appointments within next 150 days and meeting all other requirements     This virtual check-in was done via [employer] and patient was informed that this is a secure, HIPAA-compliant platform  She agrees to proceed  Patient agrees to participate in a virtual check in via telephone or video visit instead of presenting to the office to address urgent/immediate medical needs  Patient is aware this is a billable service  After connecting through [facility], the patient was identified by name and date of birth  Ariela Correa was informed that this was a telemedicine visit and that the exam was being conducted confidentially over secure lines  My office door was closed  No one else was in the room  Ariela Correa acknowledged consent and understanding of privacy and security of the telemedicine visit  I informed the patient that I have reviewed her record in Epic and presented the opportunity for her to ask any questions regarding the visit today  The patient agreed to participate  Subjective:   Ariela Correa is a 33 y o  female who is concerned about COVID-19  Patient's symptoms include nasal congestion, rhinorrhea, anosmia, cough, diarrhea and headache  Patient denies fever, chills, fatigue, malaise, sore throat, loss of taste, shortness of breath, chest tightness, abdominal pain, nausea, vomiting and myalgias       Date of symptom onset: 9/24/2021  COVID-19 vaccination status: Fully vaccinated    Exposure:   Contact with a person who is under investigation (PUI) for or who is positive for COVID-19 within the last 14 days?: No    Hospitalized recently for fever and/or lower respiratory symptoms?: No      Currently a healthcare worker that is involved in direct patient care?: No      Works in a special setting where the risk of COVID-19 transmission may be high? (this may include long-term care, correctional and skilled nursing facilities; homeless shelters; assisted-living facilities and group homes ): No      Resident in a special setting where the risk of COVID-19 transmission may be high? (this may include long-term care, correctional and skilled nursing facilities; homeless shelters; assisted-living facilities and group homes ): No      Lab Results   Component Value Date    SARSCOV2 Not Detected 09/04/2020     Past Medical History:   Diagnosis Date    Arthritis     Bariatric surgery status     Chronic kidney disease     Depression     Heartburn     Hypertension     Kidney stone     Obesity     Postsurgical malabsorption     Seasonal allergies     Sleep apnea     No cpap    Wears glasses      Past Surgical History:   Procedure Laterality Date    EGD      NH LAP GASTRIC BYPASS/MILTON-EN-Y N/A 12/3/2019    Procedure: ROBOTIC BYPASS GASTRIC MILTON-EN-Y, intraop egd;  Surgeon: Anya Gardner MD;  Location: [facility];  Service: Bariatrics     Current Outpatient Medications   Medication Sig Dispense Refill    Calcium-Vitamin D-Vitamin K (CALCIUM SOFT CHEWS PO) Take by mouth      fluticasone (FLONASE) 50 mcg/act nasal spray 2 sprays into each nostril daily 1 Bottle 2    Iron Carbonyl-Vitamin C-FOS (Chewable Iron) 30-10-25 MG CHEW Chew      losartan (Cozaar) 100 MG tablet Take 1 tablet (100 mg total) by mouth daily 30 tablet 2    mometasone (NASONEX) 50 mcg/act nasal spray 2 sprays into each nostril as needed       multivitamin (THERAGRAN) TABS Take 1 tablet by mouth daily      PARoxetine (PAXIL) 10 mg tablet Take 1 tablet (10 mg total) by mouth daily (Patient not taking: Reported on 9/24/2021) 30 tablet 1     No current facility-administered medications for this visit  No Known Allergies    Review of Systems   Constitutional: Negative for activity change, chills, fatigue and fever  HENT: Positive for congestion, postnasal drip, rhinorrhea and sneezing  Negative for sore throat  Eyes: Negative for visual disturbance  Respiratory: Positive for cough  Negative for chest tightness, shortness of breath and wheezing  Cardiovascular: Negative for chest pain and palpitations  Gastrointestinal: Positive for diarrhea  Negative for abdominal pain, blood in stool, constipation, nausea and vomiting  Genitourinary: Negative for hematuria  Musculoskeletal: Negative for myalgias  Neurological: Positive for headaches  Hematological: Negative for adenopathy  Psychiatric/Behavioral: Negative for agitation and confusion  Objective:    Vitals:    09/24/21 1451   Temp: 98 °F (36 7 °C)   TempSrc: Temporal   SpO2: 98%       Physical Exam  Vitals and nursing note reviewed  Constitutional:       General: She is not in acute distress  Appearance: Normal appearance  She is ill-appearing  She is not toxic-appearing or diaphoretic  HENT:      Head: Normocephalic and atraumatic  Nose: No rhinorrhea  Eyes:      General:         Right eye: No discharge  Left eye: No discharge  Cardiovascular:      Rate and Rhythm: Normal rate and regular rhythm  Pulses: Normal pulses  Heart sounds: Normal heart sounds  Pulmonary:      Effort: Pulmonary effort is normal  No respiratory distress  Breath sounds: No wheezing, rhonchi or rales  Musculoskeletal:         General: Normal range of motion  Cervical back: Normal range of motion  Skin:     Coloration: Skin is not jaundiced or pale  Findings: No bruising, erythema, lesion or rash  Neurological:      Mental Status: She is alert and oriented to person, place, and time     Psychiatric:         Mood and Affect: Mood normal          Behavior: Behavior normal  Thought Content: Thought content normal          Judgment: Judgment normal          VIRTUAL VISIT DISCLAIMER    Racheal Del Real verbally agrees to participate in [employer]  Pt is aware that [employer] could be limited without vital signs or the ability to perform a full hands-on physical Charjyothi Escobedo understands she or the provider may request at any time to terminate the video visit and request the patient to seek care or treatment in person

## 2021-09-24 NOTE — ASSESSMENT & PLAN NOTE
Acute symptomatic patient started with the nasal congestion this morning  Children are currently sick as well  Associated symptoms include, sneezing, headache, diarrhea, cough, and loss of smell  Patient denies known COVID contact  Patient has received COVID vaccine series  Will recommend increase fluids, COVID vitamins, and testing sent to the lab for RSV flu and COVID    Patient call with worsening of symptoms

## 2021-09-24 NOTE — ASSESSMENT & PLAN NOTE
Covid Pfizer vaccine series completed  Last injection given 1/13/2021  Patient currently symptomatic and with increase of covid variants will recommend testing

## 2021-09-24 NOTE — ASSESSMENT & PLAN NOTE
Acute symptomatic patient started with cough this morning 09/24/2021  Patient denies known COVID contact  Associated symptoms include nasal congestion, sneezing, headache, diarrhea, and loss of smell  Will recommend increase fluids, COVID vitamins, and testing sent to lab for RSV flu and COVID

## 2021-09-26 LAB
FLUAV RNA RESP QL NAA+PROBE: NEGATIVE
FLUBV RNA RESP QL NAA+PROBE: NEGATIVE
RSV RNA RESP QL NAA+PROBE: POSITIVE
SARS-COV-2 RNA RESP QL NAA+PROBE: NEGATIVE

## 2021-09-27 ENCOUNTER — TELEPHONE (OUTPATIENT)
Dept: FAMILY MEDICINE CLINIC | Facility: CLINIC | Age: 31
End: 2021-09-27

## 2021-09-27 NOTE — TELEPHONE ENCOUNTER
----- Message from Zilphia Homans, [address] sent at 9/27/2021  8:27 AM EDT -----  RSV positive  Supportive care such as tylenol for fever, increase fluids, otc cold medicine

## 2021-12-13 ENCOUNTER — TELEPHONE (OUTPATIENT)
Dept: FAMILY MEDICINE CLINIC | Facility: CLINIC | Age: 31
End: 2021-12-13

## 2021-12-13 ENCOUNTER — APPOINTMENT (OUTPATIENT)
Dept: LAB | Facility: MEDICAL CENTER | Age: 31
End: 2021-12-13
Payer: COMMERCIAL

## 2021-12-13 DIAGNOSIS — Z32.01 POSITIVE PREGNANCY TEST: ICD-10-CM

## 2021-12-13 DIAGNOSIS — Z32.01 POSITIVE PREGNANCY TEST: Primary | ICD-10-CM

## 2021-12-13 LAB — B-HCG SERPL-ACNC: 3102 MIU/ML

## 2021-12-13 PROCEDURE — 36415 COLL VENOUS BLD VENIPUNCTURE: CPT

## 2021-12-13 PROCEDURE — 84702 CHORIONIC GONADOTROPIN TEST: CPT

## 2021-12-14 ENCOUNTER — TELEPHONE (OUTPATIENT)
Dept: FAMILY MEDICINE CLINIC | Facility: CLINIC | Age: 31
End: 2021-12-14

## 2022-01-21 ENCOUNTER — APPOINTMENT (OUTPATIENT)
Dept: LAB | Facility: MEDICAL CENTER | Age: 32
End: 2022-01-21
Payer: COMMERCIAL

## 2022-01-21 DIAGNOSIS — Z87.442 PERSONAL HISTORY OF URINARY CALCULI: ICD-10-CM

## 2022-01-21 DIAGNOSIS — Z34.91 FIRST TRIMESTER PREGNANCY: ICD-10-CM

## 2022-01-21 DIAGNOSIS — Z98.84 BARIATRIC SURGERY STATUS: ICD-10-CM

## 2022-01-21 DIAGNOSIS — I10 ESSENTIAL HYPERTENSION, MALIGNANT: ICD-10-CM

## 2022-01-21 DIAGNOSIS — R35.0 URINARY FREQUENCY: ICD-10-CM

## 2022-01-21 DIAGNOSIS — T75.89XS: ICD-10-CM

## 2022-01-21 DIAGNOSIS — Z87.59 PERSONAL HISTORY OF TROPHOBLASTIC DISEASE: ICD-10-CM

## 2022-01-21 LAB
25(OH)D3 SERPL-MCNC: 25.2 NG/ML (ref 30–100)
ABO GROUP BLD: NORMAL
ALBUMIN SERPL BCP-MCNC: 3.4 G/DL (ref 3.5–5)
ALP SERPL-CCNC: 82 U/L (ref 46–116)
ALT SERPL W P-5'-P-CCNC: 38 U/L (ref 12–78)
ANION GAP SERPL CALCULATED.3IONS-SCNC: 5 MMOL/L (ref 4–13)
AST SERPL W P-5'-P-CCNC: 20 U/L (ref 5–45)
BACTERIA UR QL AUTO: ABNORMAL /HPF
BASOPHILS # BLD AUTO: 0.04 THOUSANDS/ΜL (ref 0–0.1)
BASOPHILS NFR BLD AUTO: 0 % (ref 0–1)
BILIRUB SERPL-MCNC: 0.48 MG/DL (ref 0.2–1)
BILIRUB UR QL STRIP: NEGATIVE
BLD GP AB SCN SERPL QL: NEGATIVE
BUN SERPL-MCNC: 16 MG/DL (ref 5–25)
CALCIUM ALBUM COR SERPL-MCNC: 9 MG/DL (ref 8.3–10.1)
CALCIUM SERPL-MCNC: 8.5 MG/DL (ref 8.3–10.1)
CAOX CRY URNS QL MICRO: ABNORMAL /HPF
CHLORIDE SERPL-SCNC: 107 MMOL/L (ref 100–108)
CLARITY UR: CLEAR
CO2 SERPL-SCNC: 22 MMOL/L (ref 21–32)
COLOR UR: YELLOW
CREAT SERPL-MCNC: 0.52 MG/DL (ref 0.6–1.3)
EOSINOPHIL # BLD AUTO: 0.1 THOUSAND/ΜL (ref 0–0.61)
EOSINOPHIL NFR BLD AUTO: 1 % (ref 0–6)
ERYTHROCYTE [DISTWIDTH] IN BLOOD BY AUTOMATED COUNT: 13.4 % (ref 11.6–15.1)
FERRITIN SERPL-MCNC: 16 NG/ML (ref 8–388)
FOLATE SERPL-MCNC: >20 NG/ML (ref 3.1–17.5)
GFR SERPL CREATININE-BSD FRML MDRD: 125 ML/MIN/1.73SQ M
GLUCOSE SERPL-MCNC: 159 MG/DL (ref 65–140)
GLUCOSE UR STRIP-MCNC: NEGATIVE MG/DL
HBV SURFACE AB SER-ACNC: 690.03 MIU/ML
HBV SURFACE AG SER QL: NORMAL
HCT VFR BLD AUTO: 36.4 % (ref 34.8–46.1)
HCV AB SER QL: NORMAL
HGB BLD-MCNC: 12.1 G/DL (ref 11.5–15.4)
HGB UR QL STRIP.AUTO: ABNORMAL
IMM GRANULOCYTES # BLD AUTO: 0.04 THOUSAND/UL (ref 0–0.2)
IMM GRANULOCYTES NFR BLD AUTO: 0 % (ref 0–2)
KETONES UR STRIP-MCNC: NEGATIVE MG/DL
LEUKOCYTE ESTERASE UR QL STRIP: ABNORMAL
LYMPHOCYTES # BLD AUTO: 2.75 THOUSANDS/ΜL (ref 0.6–4.47)
LYMPHOCYTES NFR BLD AUTO: 27 % (ref 14–44)
MAGNESIUM SERPL-MCNC: 1.8 MG/DL (ref 1.6–2.6)
MCH RBC QN AUTO: 30.6 PG (ref 26.8–34.3)
MCHC RBC AUTO-ENTMCNC: 33.2 G/DL (ref 31.4–37.4)
MCV RBC AUTO: 92 FL (ref 82–98)
MONOCYTES # BLD AUTO: 0.56 THOUSAND/ΜL (ref 0.17–1.22)
MONOCYTES NFR BLD AUTO: 6 % (ref 4–12)
NEUTROPHILS # BLD AUTO: 6.57 THOUSANDS/ΜL (ref 1.85–7.62)
NEUTS SEG NFR BLD AUTO: 66 % (ref 43–75)
NITRITE UR QL STRIP: NEGATIVE
NON-SQ EPI CELLS URNS QL MICRO: ABNORMAL /HPF
NRBC BLD AUTO-RTO: 0 /100 WBCS
PH UR STRIP.AUTO: 6 [PH]
PLATELET # BLD AUTO: 223 THOUSANDS/UL (ref 149–390)
PMV BLD AUTO: 11.5 FL (ref 8.9–12.7)
POTASSIUM SERPL-SCNC: 3.6 MMOL/L (ref 3.5–5.3)
PROT SERPL-MCNC: 6.9 G/DL (ref 6.4–8.2)
PROT UR STRIP-MCNC: ABNORMAL MG/DL
RBC # BLD AUTO: 3.95 MILLION/UL (ref 3.81–5.12)
RBC #/AREA URNS AUTO: ABNORMAL /HPF
RH BLD: POSITIVE
RUBV IGG SERPL IA-ACNC: >175 IU/ML
SODIUM SERPL-SCNC: 134 MMOL/L (ref 136–145)
SP GR UR STRIP.AUTO: >=1.03 (ref 1–1.03)
SPECIMEN EXPIRATION DATE: NORMAL
UROBILINOGEN UR QL STRIP.AUTO: 0.2 E.U./DL
VIT B12 SERPL-MCNC: 754 PG/ML (ref 100–900)
WBC # BLD AUTO: 10.06 THOUSAND/UL (ref 4.31–10.16)
WBC #/AREA URNS AUTO: ABNORMAL /HPF

## 2022-01-21 PROCEDURE — 83036 HEMOGLOBIN GLYCOSYLATED A1C: CPT

## 2022-01-21 PROCEDURE — 80053 COMPREHEN METABOLIC PANEL: CPT

## 2022-01-21 PROCEDURE — 80081 OBSTETRIC PANEL INC HIV TSTG: CPT

## 2022-01-21 PROCEDURE — 83735 ASSAY OF MAGNESIUM: CPT

## 2022-01-21 PROCEDURE — 87086 URINE CULTURE/COLONY COUNT: CPT

## 2022-01-21 PROCEDURE — 86777 TOXOPLASMA ANTIBODY: CPT

## 2022-01-21 PROCEDURE — 36415 COLL VENOUS BLD VENIPUNCTURE: CPT

## 2022-01-21 PROCEDURE — 86803 HEPATITIS C AB TEST: CPT

## 2022-01-21 PROCEDURE — 86778 TOXOPLASMA ANTIBODY IGM: CPT

## 2022-01-21 PROCEDURE — 82746 ASSAY OF FOLIC ACID SERUM: CPT

## 2022-01-21 PROCEDURE — 82728 ASSAY OF FERRITIN: CPT

## 2022-01-21 PROCEDURE — 81001 URINALYSIS AUTO W/SCOPE: CPT

## 2022-01-21 PROCEDURE — 82607 VITAMIN B-12: CPT

## 2022-01-21 PROCEDURE — 82306 VITAMIN D 25 HYDROXY: CPT

## 2022-01-21 PROCEDURE — 86706 HEP B SURFACE ANTIBODY: CPT

## 2022-01-22 LAB
BACTERIA UR CULT: NORMAL
CLINICAL COMMENT: NORMAL
EST. AVERAGE GLUCOSE BLD GHB EST-MCNC: 100 MG/DL
HBA1C MFR BLD: 5.1 %
T GONDII IGG SERPL IA-ACNC: <3 IU/ML (ref 0–7.1)
T GONDII IGM SER IA-ACNC: 3 AU/ML (ref 0–7.9)

## 2022-01-23 LAB
HIV 1+2 AB+HIV1 P24 AG SERPL QL IA: NORMAL
RPR SER QL: NORMAL

## 2022-03-29 ENCOUNTER — APPOINTMENT (OUTPATIENT)
Dept: LAB | Facility: MEDICAL CENTER | Age: 32
End: 2022-03-29
Payer: COMMERCIAL

## 2022-03-29 ENCOUNTER — TELEPHONE (OUTPATIENT)
Dept: FAMILY MEDICINE CLINIC | Facility: CLINIC | Age: 32
End: 2022-03-29

## 2022-03-29 DIAGNOSIS — F41.9 ANXIETY AND DEPRESSION: ICD-10-CM

## 2022-03-29 DIAGNOSIS — T75.89XS: ICD-10-CM

## 2022-03-29 DIAGNOSIS — R35.0 URINARY FREQUENCY: ICD-10-CM

## 2022-03-29 DIAGNOSIS — Z87.442 PERSONAL HISTORY OF URINARY CALCULI: ICD-10-CM

## 2022-03-29 DIAGNOSIS — Z34.91 FIRST TRIMESTER PREGNANCY: ICD-10-CM

## 2022-03-29 DIAGNOSIS — I10 BENIGN ESSENTIAL HYPERTENSION: ICD-10-CM

## 2022-03-29 DIAGNOSIS — F32.A ANXIETY AND DEPRESSION: ICD-10-CM

## 2022-03-29 DIAGNOSIS — Z98.84 BARIATRIC SURGERY STATUS: ICD-10-CM

## 2022-03-29 DIAGNOSIS — I10 ESSENTIAL HYPERTENSION, MALIGNANT: ICD-10-CM

## 2022-03-29 DIAGNOSIS — Z87.59 PERSONAL HISTORY OF TROPHOBLASTIC DISEASE: ICD-10-CM

## 2022-03-29 DIAGNOSIS — D72.829 LEUKOCYTOSIS, UNSPECIFIED TYPE: Primary | ICD-10-CM

## 2022-03-29 LAB
BASOPHILS # BLD AUTO: 0.03 THOUSANDS/ΜL (ref 0–0.1)
BASOPHILS NFR BLD AUTO: 0 % (ref 0–1)
EOSINOPHIL # BLD AUTO: 0.1 THOUSAND/ΜL (ref 0–0.61)
EOSINOPHIL NFR BLD AUTO: 1 % (ref 0–6)
ERYTHROCYTE [DISTWIDTH] IN BLOOD BY AUTOMATED COUNT: 13.2 % (ref 11.6–15.1)
GLUCOSE P FAST SERPL-MCNC: 73 MG/DL (ref 65–99)
HCT VFR BLD AUTO: 36.3 % (ref 34.8–46.1)
HGB BLD-MCNC: 11.9 G/DL (ref 11.5–15.4)
IMM GRANULOCYTES # BLD AUTO: 0.05 THOUSAND/UL (ref 0–0.2)
IMM GRANULOCYTES NFR BLD AUTO: 1 % (ref 0–2)
IRON SERPL-MCNC: 116 UG/DL (ref 50–170)
LYMPHOCYTES # BLD AUTO: 2.52 THOUSANDS/ΜL (ref 0.6–4.47)
LYMPHOCYTES NFR BLD AUTO: 23 % (ref 14–44)
MCH RBC QN AUTO: 31.5 PG (ref 26.8–34.3)
MCHC RBC AUTO-ENTMCNC: 32.8 G/DL (ref 31.4–37.4)
MCV RBC AUTO: 96 FL (ref 82–98)
MONOCYTES # BLD AUTO: 0.53 THOUSAND/ΜL (ref 0.17–1.22)
MONOCYTES NFR BLD AUTO: 5 % (ref 4–12)
NEUTROPHILS # BLD AUTO: 7.66 THOUSANDS/ΜL (ref 1.85–7.62)
NEUTS SEG NFR BLD AUTO: 70 % (ref 43–75)
NRBC BLD AUTO-RTO: 0 /100 WBCS
PLATELET # BLD AUTO: 224 THOUSANDS/UL (ref 149–390)
PMV BLD AUTO: 11.2 FL (ref 8.9–12.7)
RBC # BLD AUTO: 3.78 MILLION/UL (ref 3.81–5.12)
WBC # BLD AUTO: 10.89 THOUSAND/UL (ref 4.31–10.16)

## 2022-03-29 PROCEDURE — 36415 COLL VENOUS BLD VENIPUNCTURE: CPT

## 2022-03-29 PROCEDURE — 85025 COMPLETE CBC W/AUTO DIFF WBC: CPT

## 2022-03-29 PROCEDURE — 82947 ASSAY GLUCOSE BLOOD QUANT: CPT

## 2022-03-29 PROCEDURE — 83540 ASSAY OF IRON: CPT

## 2022-03-29 NOTE — TELEPHONE ENCOUNTER
----- Message from Josué Slainas MD sent at 3/29/2022  2:19 PM EDT -----  Patient has elevated white blood cell recommend to repeated the in 1 month

## 2022-03-30 ENCOUNTER — APPOINTMENT (OUTPATIENT)
Dept: LAB | Facility: MEDICAL CENTER | Age: 32
End: 2022-03-30
Payer: COMMERCIAL

## 2022-03-30 DIAGNOSIS — Z87.442 HISTORY OF KIDNEY STONES: ICD-10-CM

## 2022-03-30 LAB
CREAT 24H UR-MRATE: 1.7 G/24HR (ref 0.6–1.8)
PROT 24H UR-MCNC: 652.5 MG/24 HRS (ref 40–150)
SPECIMEN VOL UR: 2250 ML
SPECIMEN VOL UR: 2250 ML

## 2022-03-30 PROCEDURE — 82570 ASSAY OF URINE CREATININE: CPT

## 2022-03-30 PROCEDURE — 84156 ASSAY OF PROTEIN URINE: CPT

## 2022-05-04 ENCOUNTER — TELEPHONE (OUTPATIENT)
Dept: BARIATRICS | Facility: CLINIC | Age: 32
End: 2022-05-04

## 2022-05-04 NOTE — TELEPHONE ENCOUNTER
----- Message from Xavier Weiner RN sent at 5/3/2022  2:25 PM EDT -----  Regardin year f/u appointment  Please contact patient to schedule a 2 year follow up appointment    Thank You

## 2022-05-04 NOTE — TELEPHONE ENCOUNTER
Left voice message for patient to call the office [phone] to schedule her 2 year follow-up appointment

## 2022-06-04 ENCOUNTER — APPOINTMENT (OUTPATIENT)
Dept: LAB | Facility: MEDICAL CENTER | Age: 32
End: 2022-06-04
Payer: COMMERCIAL

## 2022-06-04 DIAGNOSIS — Z34.93 PRENATAL CARE IN THIRD TRIMESTER: ICD-10-CM

## 2022-06-04 LAB
25(OH)D3 SERPL-MCNC: 35.3 NG/ML (ref 30–100)
EST. AVERAGE GLUCOSE BLD GHB EST-MCNC: 97 MG/DL
FERRITIN SERPL-MCNC: 11 NG/ML (ref 8–388)
GLUCOSE P FAST SERPL-MCNC: 72 MG/DL (ref 65–99)
HBA1C MFR BLD: 5 %

## 2022-06-04 PROCEDURE — 82306 VITAMIN D 25 HYDROXY: CPT

## 2022-06-04 PROCEDURE — 82947 ASSAY GLUCOSE BLOOD QUANT: CPT

## 2022-06-04 PROCEDURE — 86592 SYPHILIS TEST NON-TREP QUAL: CPT

## 2022-06-04 PROCEDURE — 36415 COLL VENOUS BLD VENIPUNCTURE: CPT

## 2022-06-04 PROCEDURE — 83036 HEMOGLOBIN GLYCOSYLATED A1C: CPT

## 2022-06-04 PROCEDURE — 82728 ASSAY OF FERRITIN: CPT

## 2022-06-06 LAB — RPR SER QL: NORMAL

## 2022-06-14 ENCOUNTER — TELEMEDICINE (OUTPATIENT)
Dept: FAMILY MEDICINE CLINIC | Facility: CLINIC | Age: 32
End: 2022-06-14
Payer: COMMERCIAL

## 2022-06-14 DIAGNOSIS — R68.89 FLU-LIKE SYMPTOMS: Primary | ICD-10-CM

## 2022-06-14 PROBLEM — Z20.822 EXPOSURE TO COVID-19 VIRUS: Status: RESOLVED | Noted: 2021-01-06 | Resolved: 2022-06-14

## 2022-06-14 PROCEDURE — 87636 SARSCOV2 & INF A&B AMP PRB: CPT | Performed by: NURSE PRACTITIONER

## 2022-06-14 PROCEDURE — 99213 OFFICE O/P EST LOW 20 MIN: CPT | Performed by: NURSE PRACTITIONER

## 2022-06-14 NOTE — ASSESSMENT & PLAN NOTE
Acute symptomatic new onset bodyaches, chills, fever, headache, sweating, weakness, and diarrhea starting 6/13/2022  Patient has had covid Pfizer vaccine last injection 1/13/2021  Will recommend patient increase fluids, covid vitamins, and report for covid/flu testing, orders placed  Educated to call with worsening of symptoms

## 2022-06-14 NOTE — PROGRESS NOTES
COVID-19 Outpatient Progress Note    Assessment/Plan:    Problem List Items Addressed This Visit        Other    Flu-like symptoms - Primary     Acute symptomatic new onset bodyaches, chills, fever, headache, sweating, weakness, and diarrhea starting 6/13/2022  Patient has had covid Pfizer vaccine last injection 1/13/2021  Will recommend patient increase fluids, covid vitamins, and report for covid/flu testing, orders placed  Educated to call with worsening of symptoms  Relevant Orders    Covid/Flu- Mobile Awilda Bradshaw or [facility] Now Collect (Completed)         Disposition:     Referred patient to centralized site to test for COVID-19/Influenza  Discussed symptom directed medication options with patient  Discussed vitamin D, vitamin C, and/or zinc supplementation with patient  I have spent 15 minutes directly with the patient  Greater than 50% of this time was spent in counseling/coordination of care regarding: instructions for management and patient and family education  Encounter provider NANCI Smith    Provider located at [facility] AT [place] [address] [address] [address] [phone]    Recent Visits  Date Type Provider Dept   06/14/22 Telemedicine Paty [address], 214 [facility] recent visits within past 7 days and meeting all other requirements  Today's Visits  Date Type Provider Dept   06/15/22 Telemedicine Paty [address], 299 [facility]   06/15/22 Telephone [facility]   Showing today's visits and meeting all other requirements  Future Appointments  No visits were found meeting these conditions  Showing future appointments within next 150 days and meeting all other requirements     This virtual check-in was done via [employer] and patient was informed that this is a secure, HIPAA-compliant platform   She agrees to proceed  Patient agrees to participate in a virtual check in via telephone or video visit instead of presenting to the office to address urgent/immediate medical needs  Patient is aware this is a billable service  After connecting through [facility], the patient was identified by name and date of birth  Stacie Oleary was informed that this was a telemedicine visit and that the exam was being conducted confidentially over secure lines  My office door was closed  No one else was in the room  Stacie Oleary acknowledged consent and understanding of privacy and security of the telemedicine visit  I informed the patient that I have reviewed her record in Epic and presented the opportunity for her to ask any questions regarding the visit today  The patient agreed to participate  Verification of patient location:  Patient is located in the following state in which I hold an active license: [place]    Subjective:   Stacie Oleary is a 27 y o  female who is concerned about COVID-19  Patient's symptoms include fever, chills, fatigue, diarrhea, myalgias and headache   Patient denies malaise, congestion, rhinorrhea, sore throat, anosmia, loss of taste, cough, shortness of breath, chest tightness, abdominal pain, nausea and vomiting      - Date of symptom onset: 6/13/2022      COVID-19 vaccination status: Fully vaccinated (primary series)    Exposure:   Contact with a person who is under investigation (PUI) for or who is positive for COVID-19 within the last 14 days?: No    Hospitalized recently for fever and/or lower respiratory symptoms?: No      Currently a healthcare worker that is involved in direct patient care?: No      Works in a special setting where the risk of COVID-19 transmission may be high? (this may include long-term care, correctional and care home facilities; homeless shelters; assisted-living facilities and group homes ): No      Resident in a special setting where the risk of COVID-19 transmission may be high? (this may include long-term care, correctional and FPC facilities; homeless shelters; assisted-living facilities and group homes ): No      Lab Results   Component Value Date    SARSCOV2 Positive (A) 06/14/2022    [address] [facility] 98 Not Detected 09/04/2020     Past Medical History:   Diagnosis Date    Arthritis     Bariatric surgery status     Chronic kidney disease     Depression     Heartburn     Hypertension     Kidney stone     Obesity     Postsurgical malabsorption     Seasonal allergies     Sleep apnea     No cpap    Wears glasses      Past Surgical History:   Procedure Laterality Date    EGD      [place] LAP GASTRIC BYPASS/MILTON-EN-Y N/A 12/3/2019    Procedure: ROBOTIC BYPASS GASTRIC MILTON-EN-Y, intraop egd;  Surgeon: Jenna Guerra MD;  [facility];  Service: Bariatrics     Current Outpatient Medications   Medication Sig Dispense Refill    Calcium-Vitamin D-Vitamin K (CALCIUM SOFT CHEWS PO) Take by mouth      fluticasone (FLONASE) 50 mcg/act nasal spray 2 sprays into each nostril daily 1 Bottle 2    Iron Carbonyl-Vitamin C-FOS (Chewable Iron) 30-10-25 MG CHEW Chew      losartan (Cozaar) 100 MG tablet Take 1 tablet (100 mg total) by mouth daily 30 tablet 2    mometasone (NASONEX) 50 mcg/act nasal spray 2 sprays into each nostril as needed       multivitamin (THERAGRAN) TABS Take 1 tablet by mouth daily      PARoxetine (PAXIL) 10 mg tablet Take 1 tablet (10 mg total) by mouth daily (Patient not taking: Reported on 9/24/2021) 30 tablet 1     No current facility-administered medications for this visit  No Known Allergies    Review of Systems   Constitutional: Positive for activity change, chills, fatigue and fever  HENT: Negative for congestion, rhinorrhea, sneezing and sore throat  Respiratory: Negative for cough, chest tightness, shortness of breath and wheezing  Cardiovascular: Negative for chest pain  Gastrointestinal: Positive for diarrhea  Negative for abdominal pain, constipation, nausea and vomiting  Musculoskeletal: Positive for myalgias  Neurological: Positive for headaches  Objective: There were no vitals filed for this visit  Physical Exam  Vitals and nursing note reviewed  Constitutional:       General: She is not in acute distress  Appearance: Normal appearance  She is ill-appearing  She is not toxic-appearing or diaphoretic  HENT:      Head: Normocephalic and atraumatic  Nose: No rhinorrhea  Eyes:      General:         Right eye: No discharge  Left eye: No discharge  Pulmonary:      Effort: Pulmonary effort is normal  No respiratory distress  Musculoskeletal:         General: Normal range of motion  Cervical back: Normal range of motion  Skin:     Coloration: Skin is not jaundiced or pale  Findings: No bruising, erythema, lesion or rash  Neurological:      Mental Status: She is alert and oriented to person, place, and time  Psychiatric:         Mood and Affect: Mood normal          Behavior: Behavior normal          Thought Content: Thought content normal          Judgment: Judgment normal          VIRTUAL VISIT DISCLAIMER    Keri Omayrajason verbally agrees to participate in [employer]  Pt is aware that [employer] could be limited without vital signs or the ability to perform a full hands-on physical Levester Severo understands she or the provider may request at any time to terminate the video visit and request the patient to seek care or treatment in person

## 2022-06-15 ENCOUNTER — TELEPHONE (OUTPATIENT)
Dept: FAMILY MEDICINE CLINIC | Facility: CLINIC | Age: 32
End: 2022-06-15

## 2022-06-15 ENCOUNTER — TELEMEDICINE (OUTPATIENT)
Dept: FAMILY MEDICINE CLINIC | Facility: CLINIC | Age: 32
End: 2022-06-15
Payer: COMMERCIAL

## 2022-06-15 DIAGNOSIS — U07.1 COVID-19 VIRUS INFECTION: Primary | ICD-10-CM

## 2022-06-15 LAB
FLUAV RNA RESP QL NAA+PROBE: NEGATIVE
FLUBV RNA RESP QL NAA+PROBE: NEGATIVE
SARS-COV-2 RNA RESP QL NAA+PROBE: POSITIVE

## 2022-06-15 PROCEDURE — 99213 OFFICE O/P EST LOW 20 MIN: CPT | Performed by: NURSE PRACTITIONER

## 2022-06-15 NOTE — PROGRESS NOTES
COVID-19 Outpatient Progress Note    Assessment/Plan:    Problem List Items Addressed This Visit        Other    COVID-19 virus infection - Primary     New diagnoses acute symptomatic patient positive for COVID 06/14/2022  Patient has had COVID vaccine series  Patient denies shortness of breath chest pain  Will recommend increase fluids, covid vitamins, otc tylenol, and can return to work 6/20/2022 with 5 days strict masking following  Educated to call with worsening of symptoms or report to ED after hours  Disposition:     Patient has COVID-19 infection  Based off CDC guidelines, they were recommended to isolate for 5 days from the date of the positive test  If they remain asymptomatic, isolation may be ended followed by 5 days of wearing a mask when around othes to minimize risk of infecting others  If they have a fever, continue to stay home until fever resolves for at least 24 hours  I have spent 15 minutes directly with the patient  Greater than 50% of this time was spent in counseling/coordination of care regarding: instructions for management and patient and family education  Encounter provider NANCI Platt    Provider located at [facility] AT [address] [place] [address] [phone]    Recent Visits  Date Type Provider Dept   06/15/22 Telemedicine Paty [address] [facility], 299 [facility]   06/15/22 Telephone [facility]   06/14/22 Telemedicine Paty [address], 1021 [facility]   Showing recent visits within past 7 days and meeting all other requirements  Future Appointments  No visits were found meeting these conditions    Showing future appointments within next 150 days and meeting all other requirements     This virtual check-in was done via [id number] and patient was informed that this is a secure, HIPAA-compliant platform  She agrees to proceed  Patient agrees to participate in a virtual check in via telephone or video visit instead of presenting to the office to address urgent/immediate medical needs  Patient is aware this is a billable service  After connecting through [facility], the patient was identified by name and date of birth  Guru Vigil was informed that this was a telemedicine visit and that the exam was being conducted confidentially over secure lines  My office door was closed  No one else was in the room  Guru Vigil acknowledged consent and understanding of privacy and security of the telemedicine visit  I informed the patient that I have reviewed her record in Epic and presented the opportunity for her to ask any questions regarding the visit today  The patient agreed to participate  Verification of patient location:  Patient is located in the following state in which I hold an active license: [place]    Subjective:   Guru Vigil is a 27 y o  female who has been screened for COVID-19  Patient's symptoms include fever, diarrhea and myalgias  Patient denies chills, fatigue, malaise, congestion, rhinorrhea, sore throat, anosmia, loss of taste, cough, shortness of breath, chest tightness, abdominal pain, nausea, vomiting and headaches  - Date of symptom onset: 6/14/2022  - Date of positive COVID-19 test: 6/14/2022  Type of test: PCR  COVID-19 vaccination status: Fully vaccinated (primary series)    Deonna Rodrigez has been staying home and has isolated themselves in her home  She is taking care to not share personal items and is cleaning all surfaces that are touched often, like counters, tabletops, and doorknobs using household cleaning sprays or wipes  She is wearing a mask when she leaves her room       Lab Results   Component Value Date    SARSCOV2 Positive (A) 06/14/2022    SARSCOV2 Not Detected 09/04/2020     Past Medical History:   Diagnosis Date    Arthritis     Bariatric surgery status     Chronic kidney disease     Depression     Heartburn     Hypertension     Kidney stone     Obesity     Postsurgical malabsorption     Seasonal allergies     Sleep apnea     No cpap    Wears glasses      Past Surgical History:   Procedure Laterality Date    EGD      [place] LAP GASTRIC BYPASS/MILTON-EN-Y N/A 12/3/2019    Procedure: ROBOTIC BYPASS GASTRIC MILTON-EN-Y, intraop egd;  Surgeon: Gutierrez Salgado MD;  Location: [facility] OR;  Service: Bariatrics     Current Outpatient Medications   Medication Sig Dispense Refill    Calcium-Vitamin D-Vitamin K (CALCIUM SOFT CHEWS PO) Take by mouth      fluticasone (FLONASE) 50 mcg/act nasal spray 2 sprays into each nostril daily 1 Bottle 2    Iron Carbonyl-Vitamin C-FOS (Chewable Iron) 30-10-25 MG CHEW Chew      losartan (Cozaar) 100 MG tablet Take 1 tablet (100 mg total) by mouth daily 30 tablet 2    mometasone (NASONEX) 50 mcg/act nasal spray 2 sprays into each nostril as needed       multivitamin (THERAGRAN) TABS Take 1 tablet by mouth daily      PARoxetine (PAXIL) 10 mg tablet Take 1 tablet (10 mg total) by mouth daily (Patient not taking: Reported on 9/24/2021) 30 tablet 1     No current facility-administered medications for this visit  No Known Allergies    Review of Systems   Constitutional: Positive for activity change and fever  Negative for chills and fatigue  HENT: Negative for congestion, rhinorrhea, sneezing and sore throat  Respiratory: Negative for cough, chest tightness, shortness of breath and wheezing  Cardiovascular: Negative for chest pain and palpitations  Gastrointestinal: Positive for diarrhea  Negative for abdominal pain, constipation, nausea and vomiting  Musculoskeletal: Positive for myalgias  Neurological: Negative for headaches  Objective: There were no vitals filed for this visit  Physical Exam  Vitals and nursing note reviewed  Constitutional:       General: She is not in acute distress  Appearance: Normal appearance  She is ill-appearing  She is not toxic-appearing or diaphoretic  HENT:      Head: Normocephalic and atraumatic  Nose: No rhinorrhea  Eyes:      General:         Right eye: No discharge  Left eye: No discharge  Pulmonary:      Effort: Pulmonary effort is normal  No respiratory distress  Musculoskeletal:         General: Normal range of motion  Cervical back: Normal range of motion  Skin:     Coloration: Skin is not jaundiced or pale  Findings: No bruising, erythema, lesion or rash  Neurological:      Mental Status: She is alert and oriented to person, place, and time  Psychiatric:         Mood and Affect: Mood normal          Behavior: Behavior normal          Thought Content: Thought content normal          Judgment: Judgment normal          VIRTUAL VISIT DISCLAIMER    Oumou Genesis verbally agrees to participate in [employer]  Pt is aware that [employer] could be limited without vital signs or the ability to perform a full hands-on physical Vincleonela Perez understands she or the provider may request at any time to terminate the video visit and request the patient to seek care or treatment in person

## 2022-06-15 NOTE — TELEPHONE ENCOUNTER
----- Message from [employer] sent at 6/15/2022 12:42 PM EDT -----  Covid positive schedule virtual

## 2022-06-16 PROBLEM — U07.1 COVID-19 VIRUS INFECTION: Status: ACTIVE | Noted: 2022-06-16

## 2022-06-16 NOTE — ASSESSMENT & PLAN NOTE
New diagnoses acute symptomatic patient positive for COVID 06/14/2022  Patient has had COVID vaccine series  Patient denies shortness of breath chest pain  Will recommend increase fluids, covid vitamins, otc tylenol, and can return to work 6/20/2022 with 5 days strict masking following  Educated to call with worsening of symptoms or report to ED after hours

## 2022-07-21 ENCOUNTER — APPOINTMENT (OUTPATIENT)
Dept: LAB | Facility: MEDICAL CENTER | Age: 32
End: 2022-07-21
Payer: COMMERCIAL

## 2022-07-21 DIAGNOSIS — O10.013 PRE-EXISTING ESSENTIAL HYPERTENSION AFFECTING PREGNANCY IN THIRD TRIMESTER: ICD-10-CM

## 2022-07-21 LAB
ALBUMIN SERPL BCP-MCNC: 2.6 G/DL (ref 3.5–5)
ALP SERPL-CCNC: 165 U/L (ref 46–116)
ALT SERPL W P-5'-P-CCNC: 21 U/L (ref 12–78)
ANION GAP SERPL CALCULATED.3IONS-SCNC: 7 MMOL/L (ref 4–13)
AST SERPL W P-5'-P-CCNC: 16 U/L (ref 5–45)
BASOPHILS # BLD AUTO: 0.02 THOUSANDS/ΜL (ref 0–0.1)
BASOPHILS NFR BLD AUTO: 0 % (ref 0–1)
BILIRUB SERPL-MCNC: 0.29 MG/DL (ref 0.2–1)
BUN SERPL-MCNC: 12 MG/DL (ref 5–25)
CALCIUM ALBUM COR SERPL-MCNC: 10 MG/DL (ref 8.3–10.1)
CALCIUM SERPL-MCNC: 8.9 MG/DL (ref 8.3–10.1)
CHLORIDE SERPL-SCNC: 107 MMOL/L (ref 96–108)
CO2 SERPL-SCNC: 22 MMOL/L (ref 21–32)
CREAT SERPL-MCNC: 0.5 MG/DL (ref 0.6–1.3)
EOSINOPHIL # BLD AUTO: 0.12 THOUSAND/ΜL (ref 0–0.61)
EOSINOPHIL NFR BLD AUTO: 1 % (ref 0–6)
ERYTHROCYTE [DISTWIDTH] IN BLOOD BY AUTOMATED COUNT: 13.7 % (ref 11.6–15.1)
GFR SERPL CREATININE-BSD FRML MDRD: 126 ML/MIN/1.73SQ M
GLUCOSE SERPL-MCNC: 78 MG/DL (ref 65–140)
HCT VFR BLD AUTO: 33.3 % (ref 34.8–46.1)
HGB BLD-MCNC: 11 G/DL (ref 11.5–15.4)
IMM GRANULOCYTES # BLD AUTO: 0.08 THOUSAND/UL (ref 0–0.2)
IMM GRANULOCYTES NFR BLD AUTO: 1 % (ref 0–2)
LYMPHOCYTES # BLD AUTO: 2.55 THOUSANDS/ΜL (ref 0.6–4.47)
LYMPHOCYTES NFR BLD AUTO: 23 % (ref 14–44)
MCH RBC QN AUTO: 31.3 PG (ref 26.8–34.3)
MCHC RBC AUTO-ENTMCNC: 33 G/DL (ref 31.4–37.4)
MCV RBC AUTO: 95 FL (ref 82–98)
MONOCYTES # BLD AUTO: 0.77 THOUSAND/ΜL (ref 0.17–1.22)
MONOCYTES NFR BLD AUTO: 7 % (ref 4–12)
NEUTROPHILS # BLD AUTO: 7.5 THOUSANDS/ΜL (ref 1.85–7.62)
NEUTS SEG NFR BLD AUTO: 68 % (ref 43–75)
NRBC BLD AUTO-RTO: 0 /100 WBCS
PLATELET # BLD AUTO: 262 THOUSANDS/UL (ref 149–390)
PMV BLD AUTO: 11 FL (ref 8.9–12.7)
POTASSIUM SERPL-SCNC: 4 MMOL/L (ref 3.5–5.3)
PROT SERPL-MCNC: 6.7 G/DL (ref 6.4–8.4)
RBC # BLD AUTO: 3.51 MILLION/UL (ref 3.81–5.12)
SODIUM SERPL-SCNC: 136 MMOL/L (ref 135–147)
WBC # BLD AUTO: 11.04 THOUSAND/UL (ref 4.31–10.16)

## 2022-07-21 PROCEDURE — 36415 COLL VENOUS BLD VENIPUNCTURE: CPT

## 2022-07-21 PROCEDURE — 80053 COMPREHEN METABOLIC PANEL: CPT

## 2022-07-21 PROCEDURE — 85025 COMPLETE CBC W/AUTO DIFF WBC: CPT

## 2022-07-22 ENCOUNTER — APPOINTMENT (OUTPATIENT)
Dept: LAB | Facility: MEDICAL CENTER | Age: 32
End: 2022-07-22
Payer: COMMERCIAL

## 2022-07-22 DIAGNOSIS — O10.013 PRE-EXISTING ESSENTIAL HYPERTENSION AFFECTING PREGNANCY IN THIRD TRIMESTER: ICD-10-CM

## 2022-07-22 LAB
CREAT 24H UR-MRATE: 1.5 G/24HR (ref 0.6–1.8)
PROT 24H UR-MCNC: 638 MG/24 HRS (ref 40–150)
SPECIMEN VOL UR: 2200 ML
SPECIMEN VOL UR: 2200 ML

## 2022-07-22 PROCEDURE — 84156 ASSAY OF PROTEIN URINE: CPT

## 2022-07-22 PROCEDURE — 82570 ASSAY OF URINE CREATININE: CPT

## 2022-08-31 ENCOUNTER — OFFICE VISIT (OUTPATIENT)
Dept: FAMILY MEDICINE CLINIC | Facility: CLINIC | Age: 32
End: 2022-08-31
Payer: COMMERCIAL

## 2022-08-31 VITALS
DIASTOLIC BLOOD PRESSURE: 100 MMHG | HEIGHT: 68 IN | HEART RATE: 73 BPM | SYSTOLIC BLOOD PRESSURE: 138 MMHG | WEIGHT: 187 LBS | TEMPERATURE: 99.4 F | OXYGEN SATURATION: 98 % | BODY MASS INDEX: 28.34 KG/M2

## 2022-08-31 DIAGNOSIS — F41.9 ANXIETY AND DEPRESSION: ICD-10-CM

## 2022-08-31 DIAGNOSIS — Z98.84 HISTORY OF GASTRIC BYPASS: ICD-10-CM

## 2022-08-31 DIAGNOSIS — F32.A ANXIETY AND DEPRESSION: ICD-10-CM

## 2022-08-31 DIAGNOSIS — E66.3 OVERWEIGHT WITH BODY MASS INDEX (BMI) OF 28 TO 28.9 IN ADULT: ICD-10-CM

## 2022-08-31 DIAGNOSIS — E83.51 HYPOCALCEMIA: ICD-10-CM

## 2022-08-31 DIAGNOSIS — R09.81 NASAL CONGESTION: ICD-10-CM

## 2022-08-31 DIAGNOSIS — I10 CHRONIC HYPERTENSION: ICD-10-CM

## 2022-08-31 DIAGNOSIS — Z72.0 TOBACCO USE: ICD-10-CM

## 2022-08-31 DIAGNOSIS — Z00.01 ENCOUNTER FOR WELL ADULT EXAM WITH ABNORMAL FINDINGS: Primary | ICD-10-CM

## 2022-08-31 PROCEDURE — 99395 PREV VISIT EST AGE 18-39: CPT | Performed by: FAMILY MEDICINE

## 2022-08-31 PROCEDURE — 99215 OFFICE O/P EST HI 40 MIN: CPT | Performed by: FAMILY MEDICINE

## 2022-08-31 RX ORDER — HYDROCHLOROTHIAZIDE 25 MG/1
25 TABLET ORAL DAILY
Qty: 30 TABLET | Refills: 1 | Status: SHIPPED | OUTPATIENT
Start: 2022-08-31

## 2022-08-31 RX ORDER — PSEUDOEPHED/ACETAMINOPH/DIPHEN 30MG-500MG
TABLET ORAL
COMMUNITY
Start: 2022-07-28

## 2022-08-31 RX ORDER — NIFEDIPINE 30 MG/1
30 TABLET, EXTENDED RELEASE ORAL DAILY
COMMUNITY
Start: 2022-07-29 | End: 2022-08-31 | Stop reason: SDUPTHER

## 2022-08-31 RX ORDER — NIFEDIPINE 30 MG/1
30 TABLET, EXTENDED RELEASE ORAL DAILY
Qty: 30 TABLET | Refills: 11 | Status: SHIPPED | OUTPATIENT
Start: 2022-08-31 | End: 2023-08-31

## 2022-08-31 RX ORDER — FLUOXETINE HYDROCHLORIDE 20 MG/1
20 CAPSULE ORAL DAILY
Qty: 30 CAPSULE | Refills: 2 | Status: SHIPPED | OUTPATIENT
Start: 2022-08-31

## 2022-08-31 NOTE — PATIENT INSTRUCTIONS

## 2022-08-31 NOTE — PROGRESS NOTES
Tobacco Cessation Counseling: Tobacco cessation counseling was provided  The patient is sincerely urged to quit consumption of tobacco  She is not ready to quit tobacco  Medication options discussed  Patient agreed to medication  Subjective:   Chief Complaint   Patient presents with    Physical Exam        Patient ID: Neris Munroe is a 27 y o  female  Patient here for well exam and had multiple concern patient known to have history of chronic hypertension recently was pregnant develop preeclampsia during pregnancy medication has been changed currently on nifedipine 30 milligram once a day a blood pressure continued to run high at home 150/100 the 140/90 but deny any chest pain short of breath no palpitation patient continued to smoke and and she is having hard time to lose weight since the pregnancy also patient known to have history of an anxiety and depression used to be on medication she stop more than 1 year room but the her symptom coming back specially after pregnancy and been home affect the her stressed the record has been review with the patient I review her blood work order by gyn her calcium low patient has history of gastric bypass she has not been compliant with her vitamin supplement also concerned about nasal congestion no cough no wheezing no hematosis no dyspnea on exertion      The following portions of the patient's history were reviewed and updated as appropriate: allergies, current medications, past family history, past medical history, past social history, past surgical history and problem list     Review of Systems   Constitutional: Negative for activity change, appetite change, fatigue and fever  HENT: Positive for congestion  Negative for ear pain, sinus pressure, sinus pain and sore throat  Eyes: Negative for pain, discharge, redness and itching  Respiratory: Negative for cough, chest tightness, shortness of breath and stridor      Cardiovascular: Negative for chest pain, palpitations and leg swelling  Gastrointestinal: Negative for abdominal pain, blood in stool, constipation, diarrhea and nausea  Genitourinary: Negative for dysuria, flank pain, frequency and hematuria  Musculoskeletal: Negative for back pain, joint swelling and neck pain  Skin: Negative for pallor and rash  Neurological: Negative for dizziness, tremors, weakness, numbness and headaches  Hematological: Does not bruise/bleed easily  Objective:  Vitals:    08/31/22 1057 08/31/22 1134   BP: 120/80 138/100   Pulse: 73    Temp: 99 4 °F (37 4 °C)    TempSrc: Tympanic    SpO2: 98%    Weight: 84 8 kg (187 lb)    Height: 5' 7 5" (1 715 m)       Physical Exam  Vitals and nursing note reviewed  Constitutional:       General: She is not in acute distress  Appearance: She is well-developed  HENT:      Head: Normocephalic and atraumatic  Nose: Congestion present  Mouth/Throat:      Pharynx: Oropharynx is clear  Eyes:      Conjunctiva/sclera: Conjunctivae normal    Neck:      Vascular: No JVD  Cardiovascular:      Rate and Rhythm: Normal rate and regular rhythm  Heart sounds: Normal heart sounds  No murmur heard  No friction rub  No gallop  Pulmonary:      Effort: Pulmonary effort is normal       Breath sounds: Normal breath sounds  Abdominal:      General: Bowel sounds are normal       Palpations: Abdomen is soft  Tenderness: There is no abdominal tenderness  Musculoskeletal:      Right lower leg: No edema  Left lower leg: No edema  Skin:     Findings: No erythema or rash  Neurological:      Mental Status: She is oriented to person, place, and time  Assessment/Plan:    Encounter for well adult exam with abnormal findings  Advice and education were given regarding nutrition, aerobic exercises, weight bearing exercises, cardiovascular risk reduction, fall risk reduction, and age appropriate supplements         The patient was counseled regarding instructions for management, risk factor reductions, prognosis, risks and benefits of treatment options, patient and family education, and importance of compliance with treatment           Overweight with body mass index (BMI) of 28 to 28 9 in adult  A BMI today 28 86 patient has history of gastric bypass the but the recently she had the baby and the recommend patient to watch for the portion low carb low-fat diet and increased physical activity    Tobacco use  Patient chronic smoker she been smoking a half pack a day for 22 years she is aware of the complication of smoking is not ready to quit smoking yet    Chronic hypertension  A chronic blood pressure uncontrolled patient had the preeclampsia during her pregnancy and the chronic hypertension she been monitor her blood pressure at home and has been running in the 150 100 I recheck it personally today is 138/100 patient currently on a nifedipine 30 milligram will continue will add hydrochlorothiazide 25 milligram once a day proper use and possible side effect discussed the patient low-salt diet smoking cessation and the lose weight review with the patient    Anxiety and depression  Chronic patient history of depression and anxiety she used to be on Paxil and the feel it did not help a lot the since have a baby her an anxiety get worse plan to start patient on Prozac 20 milligram once a day proper use and possible side effect discussed the patient    Hypocalcemia  New diagnosis finding on a blood work done by gyn a low-calcium the patient had history of gastric bypass recommend to repeat the calcium total and ionized    Nasal congestion  A chronic asymptomatic on and off the patient had a COVID infection a is a 90 days we did not recommend to repeat the test recommend supportive treatment monitor for fever short of breath       Diagnoses and all orders for this visit:    Encounter for well adult exam with abnormal findings    Overweight with body mass index (BMI) of 28 to 28 9 in adult    Tobacco use    Chronic hypertension  -     hydrochlorothiazide (HYDRODIURIL) 25 mg tablet; Take 1 tablet (25 mg total) by mouth daily  -     NIFEdipine (PROCARDIA XL) 30 mg 24 hr tablet; Take 1 tablet (30 mg total) by mouth daily  -     CBC and differential; Future  -     Comprehensive metabolic panel; Future  -     Lipid panel; Future  -     TSH, 3rd generation with Free T4 reflex; Future  -     Ferritin; Future  -     Iron; Future  -     Vitamin B12; Future  -     Folate; Future  -     Vitamin D 25 hydroxy; Future  -     Calcium, ionized; Future  -     Magnesium; Future  -     Vitamin B1, whole blood; Future  -     Vitamin B6; Future    Hypocalcemia  -     CBC and differential; Future  -     Comprehensive metabolic panel; Future  -     Lipid panel; Future  -     TSH, 3rd generation with Free T4 reflex; Future  -     Ferritin; Future  -     Iron; Future  -     Vitamin B12; Future  -     Folate; Future  -     Vitamin D 25 hydroxy; Future  -     Calcium, ionized; Future  -     Magnesium; Future  -     Vitamin B1, whole blood; Future  -     Vitamin B6; Future    Nasal congestion    Anxiety and depression  -     FLUoxetine (PROzac) 20 mg capsule; Take 1 capsule (20 mg total) by mouth daily    History of gastric bypass  -     CBC and differential; Future  -     Comprehensive metabolic panel; Future  -     Lipid panel; Future  -     TSH, 3rd generation with Free T4 reflex; Future  -     Ferritin; Future  -     Iron; Future  -     Vitamin B12; Future  -     Folate; Future  -     Vitamin D 25 hydroxy; Future  -     Calcium, ionized; Future  -     Magnesium; Future  -     Vitamin B1, whole blood; Future  -     Vitamin B6; Future    Other orders  -     Discontinue: NIFEdipine (PROCARDIA XL) 30 mg 24 hr tablet;  Take 30 mg by mouth daily  -     Acetaminophen Extra Strength 500 MG tablet; PLEASE SEE ATTACHED FOR DETAILED DIRECTIONS

## 2022-08-31 NOTE — PROGRESS NOTES
[address]    NAME: Shirley Roy  AGE: 27 y o  SEX: female  : 1990     DATE: 2022     Assessment and Plan:     Problem List Items Addressed This Visit        Cardiovascular and Mediastinum    Chronic hypertension     A chronic blood pressure uncontrolled patient had the preeclampsia during her pregnancy and the chronic hypertension she been monitor her blood pressure at home and has been running in the 150 100 I recheck it personally today is 138/100 patient currently on a nifedipine 30 milligram will continue will add hydrochlorothiazide 25 milligram once a day proper use and possible side effect discussed the patient low-salt diet smoking cessation and the lose weight review with the patient         Relevant Medications    hydrochlorothiazide (HYDRODIURIL) 25 mg tablet    NIFEdipine (PROCARDIA XL) 30 mg 24 hr tablet    Other Relevant Orders    CBC and differential    Comprehensive metabolic panel    Lipid panel    TSH, 3rd generation with Free T4 reflex    Ferritin    Iron    Vitamin B12    Folate    Vitamin D 25 hydroxy    Calcium, ionized    Magnesium    Vitamin B1, whole blood    Vitamin B6       Other    Overweight with body mass index (BMI) of 28 to 28 9 in adult     A BMI today 28 86 patient has history of gastric bypass the but the recently she had the baby and the recommend patient to watch for the portion low carb low-fat diet and increased physical activity         Encounter for well adult exam with abnormal findings - Primary     Advice and education were given regarding nutrition, aerobic exercises, weight bearing exercises, cardiovascular risk reduction, fall risk reduction, and age appropriate supplements         The patient was counseled regarding instructions for management, risk factor reductions, prognosis, risks and benefits of treatment options, patient and family education, and importance of compliance with treatment  Tobacco use     Patient chronic smoker she been smoking a half pack a day for 22 years she is aware of the complication of smoking is not ready to quit smoking yet         Anxiety and depression     Chronic patient history of depression and anxiety she used to be on Paxil and the feel it did not help a lot the since have a baby her an anxiety get worse plan to start patient on Prozac 20 milligram once a day proper use and possible side effect discussed the patient         Relevant Medications    FLUoxetine (PROzac) 20 mg capsule    Nasal congestion     A chronic asymptomatic on and off the patient had a COVID infection a is a 90 days we did not recommend to repeat the test recommend supportive treatment monitor for fever short of breath         Hypocalcemia     New diagnosis finding on a blood work done by gyn a low-calcium the patient had history of gastric bypass recommend to repeat the calcium total and ionized         Relevant Orders    CBC and differential    Comprehensive metabolic panel    Lipid panel    TSH, 3rd generation with Free T4 reflex    Ferritin    Iron    Vitamin B12    Folate    Vitamin D 25 hydroxy    Calcium, ionized    Magnesium    Vitamin B1, whole blood    Vitamin B6      Other Visit Diagnoses     History of gastric bypass        Relevant Orders    CBC and differential    Comprehensive metabolic panel    Lipid panel    TSH, 3rd generation with Free T4 reflex    Ferritin    Iron    Vitamin B12    Folate    Vitamin D 25 hydroxy    Calcium, ionized    Magnesium    Vitamin B1, whole blood    Vitamin B6          Immunizations and preventive care screenings were discussed with patient today  Appropriate education was printed on patient's after visit summary  Counseling:  Alcohol/drug use: discussed moderation in alcohol intake, the recommendations for healthy alcohol use, and avoidance of illicit drug use    Dental Health: discussed importance of regular tooth brushing, flossing, and dental visits  Injury prevention: discussed safety/seat belts, safety helmets, smoke detectors, carbon dioxide detectors, and smoking near bedding or upholstery  Sexual health: discussed sexually transmitted diseases, partner selection, use of condoms, avoidance of unintended pregnancy, and contraceptive alternatives  Exercise: the importance of regular exercise/physical activity was discussed  Recommend exercise 3-5 times per week for at least 30 minutes  BMI Counseling: Body mass index is 28 86 kg/m²  The BMI is above normal  Nutrition recommendations include decreasing portion sizes, decreasing fast food intake and limiting drinks that contain sugar  Exercise recommendations include exercising 3-5 times per week  No pharmacotherapy was ordered  Rationale for BMI follow-up plan is due to patient being overweight or obese  Tobacco Cessation Counseling: Tobacco cessation counseling was provided  The patient is sincerely urged to quit consumption of tobacco  She is not ready to quit tobacco          Return in about 1 year (around 8/31/2023)  Chief Complaint:     Chief Complaint   Patient presents with    Physical Exam      History of Present Illness:     Adult Annual Physical   Patient here for a comprehensive physical exam  The patient reports problems - A high blood pressure depression and anxiety nasal congestion  Diet and Physical Activity  Diet/Nutrition: Bariatric diet  Exercise: walking  Depression Screening  PHQ-2/9 Depression Screening         General Health  Sleep: sleeps poorly  Hearing: normal - bilateral   Vision: wears glasses and contacts  Dental: no dental visits for >1 year  /GYN Health  F/up with GYN     Review of Systems:     Review of Systems   Constitutional: Negative for chills and fever  HENT: Positive for congestion  Negative for ear pain and sore throat  Eyes: Negative for pain and visual disturbance  Respiratory: Negative for cough and shortness of breath  Cardiovascular: Negative for chest pain and palpitations  Gastrointestinal: Negative for abdominal pain and vomiting  Genitourinary: Negative for dysuria and hematuria  Musculoskeletal: Negative for arthralgias and back pain  Skin: Negative for color change and rash  Neurological: Negative for seizures and syncope  All other systems reviewed and are negative       Past Medical History:     Past Medical History:   Diagnosis Date    Arthritis     Bariatric surgery status     Chest pain on exertion 1/9/2020    Chronic kidney disease     Cough 9/24/2021    Depression     Heartburn     Hypertension     Kidney stone     Obesity     Postsurgical malabsorption     Seasonal allergies     Sleep apnea     No cpap    Wears glasses       Past Surgical History:     Past Surgical History:   Procedure Laterality Date    EGD      NM LAP GASTRIC BYPASS/MILTON-EN-Y N/A 12/3/2019    Procedure: ROBOTIC BYPASS GASTRIC MILTON-EN-Y, intraop egd;  Surgeon: Purnima Bustos MD;  Location: [facility];  Service: Bariatrics      Social History:     Social History     Socioeconomic History    Marital status: Registered Domestic Partner     Spouse name: None    Number of children: None    Years of education: None    Highest education level: None   Occupational History    None   Tobacco Use    Smoking status: Current Some Day Smoker     Packs/day: 0 50     Years: 22 00     Pack years: 11 00     Types: Cigarettes     Start date: 5/17/2000    Smokeless tobacco: Never Used   Substance and Sexual Activity    Alcohol use: Yes     Comment: social    Drug use: No    Sexual activity: Yes     Partners: Male   Other Topics Concern    None   Social History Narrative    None     Social Determinants of Health     Financial Resource Strain: Not on file   Food Insecurity: Not on file   Transportation Needs: Not on file   Physical Activity: Not on file   Stress: Not on file   Social Connections: Not on file   Intimate Partner Violence: Not on file   Housing Stability: Not on file      Family History:     Family History   Problem Relation Age of Onset    Hypertension Family     Hypertension Mother     Alcohol abuse Father     Diabetes Father     No Known Problems Brother     Heart disease Neg Hx     Thyroid disease Neg Hx     Stroke Neg Hx       Current Medications:     Current Outpatient Medications   Medication Sig Dispense Refill    Acetaminophen Extra Strength 500 MG tablet PLEASE SEE ATTACHED FOR DETAILED DIRECTIONS      FLUoxetine (PROzac) 20 mg capsule Take 1 capsule (20 mg total) by mouth daily 30 capsule 2    hydrochlorothiazide (HYDRODIURIL) 25 mg tablet Take 1 tablet (25 mg total) by mouth daily 30 tablet 1    NIFEdipine (PROCARDIA XL) 30 mg 24 hr tablet Take 1 tablet (30 mg total) by mouth daily 30 tablet 11    Calcium-Vitamin D-Vitamin K (CALCIUM SOFT CHEWS PO) Take by mouth      Iron Carbonyl-Vitamin C-FOS (Chewable Iron) 30-10-25 MG CHEW Chew      mometasone (NASONEX) 50 mcg/act nasal spray 2 sprays into each nostril as needed       multivitamin (THERAGRAN) TABS Take 1 tablet by mouth daily       No current facility-administered medications for this visit  Allergies:     No Known Allergies   Physical Exam:     /100   Pulse 73   Temp 99 4 °F (37 4 °C) (Tympanic)   Ht 5' 7 5" (1 715 m)   Wt 84 8 kg (187 lb)   SpO2 98%   Breastfeeding No   BMI 28 86 kg/m²     Physical Exam  Vitals and nursing note reviewed  Constitutional:       General: She is not in acute distress  Appearance: She is well-developed  HENT:      Head: Normocephalic and atraumatic  Mouth/Throat:      Pharynx: Oropharynx is clear  Eyes:      Conjunctiva/sclera: Conjunctivae normal    Cardiovascular:      Rate and Rhythm: Normal rate and regular rhythm  Heart sounds: No murmur heard    Pulmonary:      Effort: Pulmonary effort is normal  No respiratory distress  Breath sounds: Normal breath sounds  Abdominal:      Palpations: Abdomen is soft  Tenderness: There is no abdominal tenderness  Musculoskeletal:      Cervical back: Neck supple  Right lower leg: No edema  Left lower leg: No edema  Skin:     General: Skin is warm and dry  Findings: No erythema or rash  Neurological:      Mental Status: She is alert and oriented to person, place, and time            Dalila Linn MD   [address] [place] [address]

## 2022-09-02 PROBLEM — R05.9 COUGH: Status: RESOLVED | Noted: 2021-09-24 | Resolved: 2022-09-02

## 2022-09-02 PROBLEM — R07.9 CHEST PAIN ON EXERTION: Status: RESOLVED | Noted: 2020-01-09 | Resolved: 2022-09-02

## 2022-09-02 NOTE — ASSESSMENT & PLAN NOTE
A chronic blood pressure uncontrolled patient had the preeclampsia during her pregnancy and the chronic hypertension she been monitor her blood pressure at home and has been running in the 150 100 I recheck it personally today is 138/100 patient currently on a nifedipine 30 milligram will continue will add hydrochlorothiazide 25 milligram once a day proper use and possible side effect discussed the patient low-salt diet smoking cessation and the lose weight review with the patient

## 2022-09-02 NOTE — ASSESSMENT & PLAN NOTE
New diagnosis finding on a blood work done by gyn a low-calcium the patient had history of gastric bypass recommend to repeat the calcium total and ionized

## 2022-09-02 NOTE — ASSESSMENT & PLAN NOTE
A BMI today 28 86 patient has history of gastric bypass the but the recently she had the baby and the recommend patient to watch for the portion low carb low-fat diet and increased physical activity

## 2022-09-02 NOTE — ASSESSMENT & PLAN NOTE
Patient chronic smoker she been smoking a half pack a day for 22 years she is aware of the complication of smoking is not ready to quit smoking yet

## 2022-09-02 NOTE — ASSESSMENT & PLAN NOTE
A chronic asymptomatic on and off the patient had a COVID infection a is a 90 days we did not recommend to repeat the test recommend supportive treatment monitor for fever short of breath

## 2022-09-02 NOTE — ASSESSMENT & PLAN NOTE
Chronic patient history of depression and anxiety she used to be on Paxil and the feel it did not help a lot the since have a baby her an anxiety get worse plan to start patient on Prozac 20 milligram once a day proper use and possible side effect discussed the patient

## 2023-09-01 NOTE — ASSESSMENT & PLAN NOTE
Chronic uncontrol asymptomatic ,patient is not taking Losartan since her gastric bypass   Per patient her blood pressure been well controled ,but lately going through stress and not been  Strict with her diet [UNKNOWN]

## 2023-10-16 ENCOUNTER — TELEPHONE (OUTPATIENT)
Dept: FAMILY MEDICINE CLINIC | Facility: CLINIC | Age: 33
End: 2023-10-16

## 2023-10-17 ENCOUNTER — VBI (OUTPATIENT)
Dept: ADMINISTRATIVE | Facility: OTHER | Age: 33
End: 2023-10-17

## 2024-01-19 ENCOUNTER — TELEPHONE (OUTPATIENT)
Dept: FAMILY MEDICINE CLINIC | Facility: CLINIC | Age: 34
End: 2024-01-19

## 2024-01-19 NOTE — TELEPHONE ENCOUNTER
Called and spoke with patient and advised that she is due for annual physical. Patient advised that she will call back to schedule.

## 2024-04-09 ENCOUNTER — TELEPHONE (OUTPATIENT)
Dept: FAMILY MEDICINE CLINIC | Facility: CLINIC | Age: 34
End: 2024-04-09

## 2024-04-09 NOTE — TELEPHONE ENCOUNTER
Called and spoke with patient to schedule physical exam. Patient states she will call back to schedule.

## 2024-09-03 ENCOUNTER — TELEPHONE (OUTPATIENT)
Dept: FAMILY MEDICINE CLINIC | Facility: CLINIC | Age: 34
End: 2024-09-03

## 2024-09-03 NOTE — TELEPHONE ENCOUNTER
Called and spoke with patient to advise that she is due for annual physical. Patient does not wish to schedule at this time

## 2024-09-17 ENCOUNTER — OFFICE VISIT (OUTPATIENT)
Dept: URGENT CARE | Facility: MEDICAL CENTER | Age: 34
End: 2024-09-17
Payer: COMMERCIAL

## 2024-09-17 VITALS
RESPIRATION RATE: 20 BRPM | WEIGHT: 180 LBS | HEIGHT: 69 IN | SYSTOLIC BLOOD PRESSURE: 190 MMHG | TEMPERATURE: 100 F | OXYGEN SATURATION: 99 % | BODY MASS INDEX: 26.66 KG/M2 | DIASTOLIC BLOOD PRESSURE: 100 MMHG | HEART RATE: 104 BPM

## 2024-09-17 DIAGNOSIS — R31.9 URINARY TRACT INFECTION WITH HEMATURIA, SITE UNSPECIFIED: ICD-10-CM

## 2024-09-17 DIAGNOSIS — R30.0 BURNING WITH URINATION: Primary | ICD-10-CM

## 2024-09-17 DIAGNOSIS — N39.0 URINARY TRACT INFECTION WITH HEMATURIA, SITE UNSPECIFIED: ICD-10-CM

## 2024-09-17 LAB
SL AMB  POCT GLUCOSE, UA: NEGATIVE
SL AMB LEUKOCYTE ESTERASE,UA: ABNORMAL
SL AMB POCT BILIRUBIN,UA: ABNORMAL
SL AMB POCT BLOOD,UA: ABNORMAL
SL AMB POCT CLARITY,UA: ABNORMAL
SL AMB POCT COLOR,UA: ABNORMAL
SL AMB POCT KETONES,UA: ABNORMAL
SL AMB POCT NITRITE,UA: ABNORMAL
SL AMB POCT PH,UA: 6.5
SL AMB POCT SPECIFIC GRAVITY,UA: 1.01
SL AMB POCT URINE PROTEIN: ABNORMAL
SL AMB POCT UROBILINOGEN: ABNORMAL

## 2024-09-17 PROCEDURE — 87086 URINE CULTURE/COLONY COUNT: CPT | Performed by: FAMILY MEDICINE

## 2024-09-17 PROCEDURE — 87186 SC STD MICRODIL/AGAR DIL: CPT | Performed by: FAMILY MEDICINE

## 2024-09-17 PROCEDURE — 99213 OFFICE O/P EST LOW 20 MIN: CPT | Performed by: FAMILY MEDICINE

## 2024-09-17 PROCEDURE — 87077 CULTURE AEROBIC IDENTIFY: CPT | Performed by: FAMILY MEDICINE

## 2024-09-17 PROCEDURE — 81002 URINALYSIS NONAUTO W/O SCOPE: CPT | Performed by: FAMILY MEDICINE

## 2024-09-17 RX ORDER — SULFAMETHOXAZOLE/TRIMETHOPRIM 800-160 MG
1 TABLET ORAL EVERY 12 HOURS SCHEDULED
Qty: 10 TABLET | Refills: 0 | Status: SHIPPED | OUTPATIENT
Start: 2024-09-17 | End: 2024-09-22

## 2024-09-17 NOTE — PROGRESS NOTES
[facility]        NAME: Janice Hollingsworth is a 34 y.o. female  : 1990    MRN: [id number]  DATE: 2024  TIME: 9:03 AM    Assessment and Plan   Burning with urination [R30.0]  1. Burning with urination  POCT urine dip    Urine culture    Urine culture      2. Urinary tract infection with hematuria, site unspecified  sulfamethoxazole-trimethoprim (BACTRIM DS) 800-160 mg per tablet            Patient Instructions       Follow up with PCP in 3-5 days.  Proceed to  ER if symptoms worsen.    If tests have been performed at [facility], our office will contact you with results if changes need to be made to the care plan discussed with you at the visit.  You can review your full results on [facility].    Chief Complaint     Chief Complaint   Patient presents with    Possible UTI     Pt states she started on Monday with fever, burning, urgency and frequency of urination.  Pt took AZO today.          History of Present Illness       34-year-old female here today with a 2-day history of UTI symptoms.  Patient informs she has had dysuria urgency and frequency.  Also noticed her urine was somewhat cloudy.  Last night she did an at home test for UTI.  Strip showed positive leukocytes and nitrite.  She started Azo tablets and has taken 2 doses so far with minimal improvement.  Last UTI several years ago.  She also informed me she had fever and chills last night.        Review of Systems   Review of Systems   Constitutional:  Positive for fever.   Genitourinary:  Positive for dysuria, frequency and urgency.         Current Medications       Current Outpatient Medications:     Acetaminophen Extra Strength 500 MG tablet, PLEASE SEE ATTACHED FOR DETAILED DIRECTIONS, Disp: , Rfl:     Calcium-Vitamin D-Vitamin K (CALCIUM SOFT CHEWS PO), Take by mouth, Disp: , Rfl:     hydrochlorothiazide (HYDRODIURIL) 25 mg tablet, Take 1 tablet (25 mg total) by mouth daily, Disp: 30 tablet, Rfl: 1    Iron  Carbonyl-Vitamin C-FOS (Chewable Iron) 30-10-25 MG CHEW, Chew, Disp: , Rfl:     mometasone (NASONEX) 50 mcg/act nasal spray, 2 sprays into each nostril as needed , Disp: , Rfl:     multivitamin (THERAGRAN) TABS, Take 1 tablet by mouth daily, Disp: , Rfl:     sulfamethoxazole-trimethoprim (BACTRIM DS) 800-160 mg per tablet, Take 1 tablet by mouth every 12 (twelve) hours for 5 days, Disp: 10 tablet, Rfl: 0    FLUoxetine (PROzac) 20 mg capsule, Take 1 capsule (20 mg total) by mouth daily (Patient not taking: Reported on 9/17/2024), Disp: 30 capsule, Rfl: 2    NIFEdipine (PROCARDIA XL) 30 mg 24 hr tablet, Take 1 tablet (30 mg total) by mouth daily (Patient not taking: Reported on 9/17/2024), Disp: 30 tablet, Rfl: 11    Current Allergies     Allergies as of 09/17/2024    (No Known Allergies)            The following portions of the patient's history were reviewed and updated as appropriate: allergies, current medications, past family history, past medical history, past social history, past surgical history and problem list.     Past Medical History:   Diagnosis Date    Arthritis     Bariatric surgery status     Chest pain on exertion 1/9/2020    Chronic kidney disease     Cough 9/24/2021    Depression     Heartburn     Hypertension     Kidney stone     Obesity     Postsurgical malabsorption     Seasonal allergies     Sleep apnea     No cpap    Wears glasses        Past Surgical History:   Procedure Laterality Date    EGD      RI LAPS GSTR RSTCV PX W/BYP MILTON-EN-Y LIMB <150 CM N/A 12/3/2019    Procedure: ROBOTIC BYPASS GASTRIC MILTON-EN-Y, intraop egd;  Surgeon: Adan Hinton MD;  Location: [facility];  Service: Bariatrics       Family History   Problem Relation Age of Onset    Hypertension Family     Hypertension Mother     Alcohol abuse Father     Diabetes Father     No Known Problems Brother     Heart disease Neg Hx     Thyroid disease Neg Hx     Stroke Neg Hx          Medications have been verified.        Objective  "  BP (!) 190/100 (BP Location: Right arm, Patient Position: Sitting) Comment: Pt took DayQuil today  Pulse 104   Temp 100 °F (37.8 °C) (Tympanic)   Resp 20   Ht 5' 9\" (1.753 m)   Wt 81.6 kg (180 lb)   LMP 09/17/2024 (Exact Date)   SpO2 99%   BMI 26.58 kg/m²   Patient's last menstrual period was 09/17/2024 (exact date).       Physical Exam     Physical Exam  Vitals and nursing note reviewed.   Constitutional:       Appearance: Normal appearance.   Abdominal:      Tenderness: There is no right CVA tenderness or left CVA tenderness.                   "

## 2024-09-19 ENCOUNTER — TELEPHONE (OUTPATIENT)
Dept: URGENT CARE | Facility: MEDICAL CENTER | Age: 34
End: 2024-09-19

## 2024-09-19 DIAGNOSIS — N39.0 URINARY TRACT INFECTION WITHOUT HEMATURIA, SITE UNSPECIFIED: Primary | ICD-10-CM

## 2024-09-19 LAB
BACTERIA UR CULT: ABNORMAL
BACTERIA UR CULT: ABNORMAL

## 2024-09-19 RX ORDER — CEPHALEXIN 500 MG/1
500 CAPSULE ORAL EVERY 6 HOURS SCHEDULED
Qty: 28 CAPSULE | Refills: 0 | Status: SHIPPED | OUTPATIENT
Start: 2024-09-19 | End: 2024-09-26

## 2024-09-19 NOTE — TELEPHONE ENCOUNTER
I spoke with the patient regarding urine culture result.  I explained to her that she has 2 different strains of E. coli; for strain is susceptible to the Bactrim which I prescribed; second strain is resistant to Bactrim and other antibiotics with the exception of cephalosporin.  I explained to her I will need to change her prescription to Keflex 500 mg twice a day for 7 days.  She informs me she still has some fever/dysuria.  She expressed understanding.

## 2024-09-30 ENCOUNTER — OFFICE VISIT (OUTPATIENT)
Dept: FAMILY MEDICINE CLINIC | Facility: CLINIC | Age: 34
End: 2024-09-30
Payer: COMMERCIAL

## 2024-09-30 ENCOUNTER — TRANSITIONAL CARE MANAGEMENT (OUTPATIENT)
Dept: FAMILY MEDICINE CLINIC | Facility: CLINIC | Age: 34
End: 2024-09-30

## 2024-09-30 VITALS
OXYGEN SATURATION: 99 % | SYSTOLIC BLOOD PRESSURE: 150 MMHG | TEMPERATURE: 99.9 F | WEIGHT: 176 LBS | BODY MASS INDEX: 26.07 KG/M2 | DIASTOLIC BLOOD PRESSURE: 90 MMHG | HEART RATE: 90 BPM | HEIGHT: 69 IN

## 2024-09-30 DIAGNOSIS — R10.9 ACUTE FLANK PAIN: Primary | ICD-10-CM

## 2024-09-30 DIAGNOSIS — N20.0 KIDNEY STONE: ICD-10-CM

## 2024-09-30 DIAGNOSIS — I10 CHRONIC HYPERTENSION: ICD-10-CM

## 2024-09-30 DIAGNOSIS — F33.1 MODERATE EPISODE OF RECURRENT MAJOR DEPRESSIVE DISORDER (HCC): ICD-10-CM

## 2024-09-30 PROCEDURE — 99496 TRANSJ CARE MGMT HIGH F2F 7D: CPT | Performed by: FAMILY MEDICINE

## 2024-09-30 RX ORDER — IBUPROFEN 600 MG/1
600 TABLET, FILM COATED ORAL EVERY 8 HOURS SCHEDULED
Qty: 30 TABLET | Refills: 0 | Status: SHIPPED | OUTPATIENT
Start: 2024-09-30 | End: 2024-10-10

## 2024-09-30 RX ORDER — OXYBUTYNIN CHLORIDE 5 MG/1
5 TABLET ORAL 3 TIMES DAILY PRN
COMMUNITY
Start: 2024-09-25

## 2024-09-30 RX ORDER — TAMSULOSIN HYDROCHLORIDE 0.4 MG/1
0.4 CAPSULE ORAL
COMMUNITY
Start: 2024-09-25

## 2024-09-30 RX ORDER — OXYCODONE HYDROCHLORIDE 5 MG/1
5 TABLET ORAL EVERY 4 HOURS PRN
COMMUNITY
Start: 2024-09-25

## 2024-10-02 NOTE — ASSESSMENT & PLAN NOTE
Chronic and controlled possible secondary to the pain discussed with the patient current finding she will hold on any evaluation and will take care of the kidney recommend to monitor blood pressure at home

## 2024-10-02 NOTE — ASSESSMENT & PLAN NOTE
Status post recent hospitalization from September 22 to September 26 right side patient had a stent placed and then removed secondary to well intolerance that she had a drainage in place now patient will follow-up with the urology as outpatient

## 2024-10-02 NOTE — ASSESSMENT & PLAN NOTE
Symptomatic status post recent hospitalization diagnosed with kidney stone status post stent placement removed secondary to intolerance now she had a drainage patient afebrile no blood in the bag recommend to take Motrin 600 x3 times a day patient continued to have oxycodone since been in the hospital recommend the patient to call the urology if symptom persistent or go to ER    Orders:    ibuprofen (MOTRIN) 600 mg tablet; Take 1 tablet (600 mg total) by mouth every 8 (eight) hours for 10 days

## 2024-10-02 NOTE — ASSESSMENT & PLAN NOTE
Depression Screening Follow-up Plan: Patient's depression screening was positive with a PHQ-9 score of 9. Patient assessed for underlying major depression. They have no active suicidal ideations. Brief counseling provided and recommend additional follow-up/re-evaluation next office visit.

## 2024-10-02 NOTE — PROGRESS NOTES
Transition of Care Visit  Name: Janice Hollingsworth      : 1990      MRN: [id number]  Encounter Provider: Veena Willson MD  Encounter Date: 2024   Encounter department: [facility]    Assessment & Plan  Acute flank pain  Symptomatic status post recent hospitalization diagnosed with kidney stone status post stent placement removed secondary to intolerance now she had a drainage patient afebrile no blood in the bag recommend to take Motrin 600 x3 times a day patient continued to have oxycodone since been in the hospital recommend the patient to call the urology if symptom persistent or go to ER    Orders:    ibuprofen (MOTRIN) 600 mg tablet; Take 1 tablet (600 mg total) by mouth every 8 (eight) hours for 10 days    Kidney stone  Status post recent hospitalization from  to  right side patient had a stent placed and then removed secondary to well intolerance that she had a drainage in place now patient will follow-up with the urology as outpatient         Chronic hypertension  Chronic and controlled possible secondary to the pain discussed with the patient current finding she will hold on any evaluation and will take care of the kidney recommend to monitor blood pressure at home         Moderate episode of recurrent major depressive disorder (HCC)  Depression Screening Follow-up Plan: Patient's depression screening was positive with a PHQ-9 score of 9. Patient assessed for underlying major depression. They have no active suicidal ideations. Brief counseling provided and recommend additional follow-up/re-evaluation next office visit.              History of Present Illness     Transitional Care Management Review:   Janice Hollingsworth is a 34 y.o. female here for TCM follow up.     During the TCM phone call patient stated:  TCM Call       Date and time call was made  2019 11:17 AM    Hospital care reviewed  Records reviewed    Patient was hospitialized  at  [facility]    Date of Admission  09/22/24    Date of discharge  09/26/24    Diagnosis  kidney stones    Disposition  Home    Were the patients medications reviewed and updated  No    Current Symptoms  Back pain - right side    Back pain, right side, severity  Severe    Back pain, right side, onset  Ongoing          TCM Call       Post hospital issues  None    Should patient be enrolled in anticoag monitoring?  No    Scheduled for follow up?  Yes    Patients specialists  Other (comment)    Other specialists names  debora becerra    Did you obtain your prescribed medications  Yes    Do you need help managing your prescriptions or medications  No    Is transportation to your appointment needed  No    I have advised the patient to call PCP with any new or worsening symptoms  [email or web address]    Living Arrangements  Family members    Support System  Family    The type of support provided  Emotional; Physical    Do you have social support  Yes, as much as I need    Are you recieving any outpatient services  No    Are you recieving home care services  No    Are you using any community resources  No    Current waiver services  No    Have you fallen in the last 12 months  No    Interperter language line needed  No    Counseling  Patient    Counseling topics  Importance of RX compliance          Patient here status post hospitalization she was admitted on September 22 for flank pain she had staghorn calculus of right kidney patient she had a stent in place on September 23 and then she returned to  to have the stent removed on the 25th secondary to intolerance and she had drainage in the place patient was a treated with antibiotic for UTI with Rocephin and she discharged on Ceftin for 7 days patient also discharged on pain medication and discharge date on September 26 with recommendation to follow-up with the urology as outpatient today patient continued to have the pain in the flank area shooting to her  "lower extremity no fever no chills looking at the back there is no blood in the bag Hospital record including CAT scan blood work urine test has been review and medication also review      Review of Systems   Constitutional:  Negative for chills and fever.   HENT:  Negative for ear pain and sore throat.    Eyes:  Negative for pain and visual disturbance.   Respiratory:  Negative for cough and shortness of breath.    Cardiovascular:  Negative for chest pain and palpitations.   Gastrointestinal:  Negative for abdominal pain, constipation, diarrhea and vomiting.   Genitourinary:  Positive for flank pain. Negative for dysuria and hematuria.   Musculoskeletal:  Negative for arthralgias and back pain.   Skin:  Negative for color change and rash.   Neurological:  Negative for seizures and syncope.   All other systems reviewed and are negative.    Objective     /90 (BP Location: Left arm, Patient Position: Sitting)   Pulse 90   Temp 99.9 °F (37.7 °C) (Tympanic)   Ht 5' 9\" (1.753 m)   Wt 79.8 kg (176 lb)   LMP 09/17/2024 (Exact Date)   SpO2 99%   Breastfeeding No   BMI 25.99 kg/m²     Physical Exam  Vitals and nursing note reviewed.   Constitutional:       General: She is not in acute distress.     Appearance: She is well-developed. She is not diaphoretic.   HENT:      Head: Normocephalic.      Right Ear: Tympanic membrane and external ear normal.      Left Ear: Tympanic membrane and external ear normal.      Nose: No rhinorrhea.      Mouth/Throat:      Pharynx: No posterior oropharyngeal erythema.   Eyes:      General:         Right eye: No discharge.         Left eye: No discharge.      Conjunctiva/sclera: Conjunctivae normal.   Neck:      Vascular: No JVD.   Cardiovascular:      Rate and Rhythm: Normal rate and regular rhythm.      Heart sounds: Normal heart sounds. No murmur heard.     No gallop.   Pulmonary:      Effort: Pulmonary effort is normal. No respiratory distress.      Breath sounds: Normal breath " sounds. No wheezing.   Abdominal:      General: There is no distension.      Palpations: Abdomen is soft.      Tenderness: There is no abdominal tenderness. There is no right CVA tenderness, left CVA tenderness or rebound.   Musculoskeletal:         General: No tenderness.      Cervical back: Normal range of motion and neck supple.   Lymphadenopathy:      Cervical: No cervical adenopathy.   Skin:     General: Skin is warm.      Findings: No rash.   Neurological:      Mental Status: She is alert and oriented to person, place, and time.       Medications have been reviewed by provider in current encounter    Administrative Statements

## 2024-11-20 ENCOUNTER — CONSULT (OUTPATIENT)
Dept: FAMILY MEDICINE CLINIC | Facility: CLINIC | Age: 34
End: 2024-11-20
Payer: COMMERCIAL

## 2024-11-20 ENCOUNTER — TELEPHONE (OUTPATIENT)
Dept: FAMILY MEDICINE CLINIC | Facility: CLINIC | Age: 34
End: 2024-11-20

## 2024-11-20 VITALS
HEART RATE: 84 BPM | TEMPERATURE: 99.3 F | SYSTOLIC BLOOD PRESSURE: 170 MMHG | HEIGHT: 68 IN | WEIGHT: 174 LBS | DIASTOLIC BLOOD PRESSURE: 90 MMHG | OXYGEN SATURATION: 99 % | BODY MASS INDEX: 26.37 KG/M2

## 2024-11-20 DIAGNOSIS — N20.0 KIDNEY STONE: ICD-10-CM

## 2024-11-20 DIAGNOSIS — I10 UNCONTROLLED HYPERTENSION: ICD-10-CM

## 2024-11-20 DIAGNOSIS — Z01.818 PRE-OP EXAMINATION: Primary | ICD-10-CM

## 2024-11-20 PROCEDURE — 99243 OFF/OP CNSLTJ NEW/EST LOW 30: CPT | Performed by: FAMILY MEDICINE

## 2024-11-20 RX ORDER — HYDROCHLOROTHIAZIDE 25 MG/1
50 TABLET ORAL DAILY
COMMUNITY
Start: 2024-11-02 | End: 2024-11-20 | Stop reason: SDUPTHER

## 2024-11-20 RX ORDER — CEPHALEXIN 500 MG/1
500 CAPSULE ORAL 2 TIMES DAILY
COMMUNITY
Start: 2024-11-18 | End: 2024-11-26

## 2024-11-20 RX ORDER — ATENOLOL 50 MG/1
50 TABLET ORAL DAILY
Qty: 30 TABLET | Refills: 5 | Status: SHIPPED | OUTPATIENT
Start: 2024-11-20

## 2024-11-20 RX ORDER — TRAMADOL HYDROCHLORIDE 50 MG/1
TABLET ORAL
COMMUNITY
Start: 2024-11-13

## 2024-11-20 RX ORDER — HYDROCHLOROTHIAZIDE 25 MG/1
25 TABLET ORAL DAILY
Qty: 30 TABLET | Refills: 2 | Status: SHIPPED | OUTPATIENT
Start: 2024-11-20

## 2024-11-20 NOTE — PROGRESS NOTES
Pre-operative Clearance  Name: Janice Hollingsworth      : 1990      MRN: [id number]  Encounter Provider: Veena Willson MD  Encounter Date: 2024   Encounter department: [facility]    Assessment & Plan  Pre-op examination  Patient is scheduled to have right percutaneous nephrolithotomy on  recommend to do EKG patient will do EKG tomorrow at her job review preop clearance including blood work and her vital sign blood pressure in the office [id number] I repeated personally it is 170/100 reviewed patient chart she had multiple reading of uncontrolled blood pressure patient not have history of hypertension currently was not on medication secondary to insurance she is on hydrochlorothiazide 25 mg will recommend to add atenolol will hold on the clearance until the blood pressure control discussed with the patient       Uncontrolled hypertension  Uncontrolled blood pressure in the office 170/90 I rechecked it personally 170/100 patient has not been taking the proper medication secondary to no insurance she is on hydrochlorothiazide 25 mg recommend to add atenolol 50 mg once a day choose beta-blocker secondary she is going to have his surgery discussed holding the surgery patient will be evaluated on Friday to recheck blood pressure and will reevaluate  Orders:    hydroCHLOROthiazide 25 mg tablet; Take 1 tablet (25 mg total) by mouth daily    atenolol (TENORMIN) 50 mg tablet; Take 1 tablet (50 mg total) by mouth daily    Kidney stone  Chronic follow-up with the urology periodically         Pre-operative Clearance:     Revised Cardiac Risk Index:  RCI RISK CLASS II (1 risk factor, risk of major cardiac complications approximately 1.3%)    Clearance:  Patient is not medically optimized for proposed surgery.  They are not cleared for surgery at this time.      Medication Instructions:   - Avoid herbs or non-directed vitamins one week prior to surgery    - Avoid aspirin  containing medications or non-steroidal anti-inflammatory drugs one week preceding surgery    - Beta blockers:  Continue to take this medication on your normal schedule.  - Diuretics: Continue taking this medication up to the evening before surgery/procedure, but do not take in the morning of the day of surgery/procedure.  - Opioids: Continue to take this medication on your normal schedule.       History of Present Illness     Pre-op Exam  Surgery: Right percutaneous nephrolithotomy laser lithotripsy with antegrade uteroscopy and stent exchange  Anticipated Date of Surgery: November 25  Surgeon: Monroe Estevez    Patient with history of kidney stone for right side with recent nephrostomy placement on September 25 recently had a urine culture was positive treated with Cipro 500 twice a day and she recently recalled by the urology to start her on another antibiotic Keflex 500 twice a day patient is scheduled to have a surgery in November 25 as above she denies any chest pain or short of breath her blood pressure uncontrolled she has not been taking the proper blood pressure medication secondary no insurance    Previous history of bleeding disorders or clots?: No  Previous Anesthesia reaction?: No  Prolonged steroid use in the last 6 months?: No    Assessment of Cardiac Risk:   - Unstable or severe angina or MI in the last 6 weeks or history of stent placement in the last year?: No   - Decompensated heart failure (e.g. New onset heart failure, NYHA  Class IV heart failure, or worsening existing heart failure)?: No  - Significant arrhythmias such as high grade AV block, symptomatic ventricular arrhythmia, newly recognized ventricular tachycardia, supraventricular tachycardia with resting heart rate >100, or symptomatic bradycardia?: No  - Severe heart valve disease including aortic stenosis or symptomatic mitral stenosis?: No      Pre-operative Risk Factors:  Elevated-risk surgery: Yes    History of cerebrovascular  disease: No    History of ischemic heart disease: No  Pre-operative treatment with insulin: No  Pre-operative creatinine >2 mg/dL: No    History of congestive heart failure: No    Duke Activity Status Index (DASI):   DASI Total Score: 58.2  METs: 9.9    Medications of Perioperative Concern:   Beta blockers    Review of Systems   Constitutional:  Negative for chills and fever.   HENT:  Negative for ear pain and sore throat.    Eyes:  Negative for pain and visual disturbance.   Respiratory:  Negative for cough and shortness of breath.    Cardiovascular:  Negative for chest pain and palpitations.   Gastrointestinal:  Negative for abdominal pain, constipation, diarrhea and vomiting.   Genitourinary:  Negative for dysuria.   Musculoskeletal:  Negative for gait problem.   Skin:  Negative for color change and rash.   Neurological:  Negative for seizures and syncope.   All other systems reviewed and are negative.    Past Medical History   Past Medical History:   Diagnosis Date    Arthritis     Bariatric surgery status     Chest pain on exertion 01/09/2020    Chronic kidney disease     Cough 09/24/2021    Depression     Flu-like symptoms 06/14/2022    Heartburn     Hypertension     Kidney stone     Obesity     Overweight with body mass index (BMI) of 28 to 28.9 in adult 10/04/2018    Postsurgical malabsorption     Seasonal allergies     Sleep apnea     No cpap    Sore throat 09/04/2020    Wears glasses      Past Surgical History:   Procedure Laterality Date    EGD      [place] LAPS GSTR RSTCV PX W/BYP MILTON-EN-Y LIMB <150 CM N/A 12/3/2019    Procedure: ROBOTIC BYPASS GASTRIC MILTON-EN-Y, intraop egd;  Surgeon: Adan Hinton MD;  Location: [facility] OR;  Service: Bariatrics     Family History   Problem Relation Age of Onset    Hypertension Family     Hypertension Mother     Alcohol abuse Father     Diabetes Father     No Known Problems Brother     Heart disease Neg Hx     Thyroid disease Neg Hx     Stroke Neg Hx      Social History  "    Tobacco Use    Smoking status: Some Days     Current packs/day: 0.50     Average packs/day: 0.5 packs/day for 24.5 years (12.3 ttl pk-yrs)     Types: Cigarettes     Start date: 5/17/2000    Smokeless tobacco: Never   Vaping Use    Vaping status: Never Used   Substance and Sexual Activity    Alcohol use: Yes     Comment: social    Drug use: No    Sexual activity: Yes     Partners: Male     Current Outpatient Medications on File Prior to Visit   Medication Sig    cephalexin (KEFLEX) 500 mg capsule Take 500 mg by mouth 2 (two) times a day    traMADol (ULTRAM) 50 mg tablet TAKE 1 TABLET (50 MG TOTAL) BY MOUTH EVERY 6 HOURS AS NEEDED FOR MODERATE PAIN (PAIN SCORE 4-6)    Acetaminophen Extra Strength 500 MG tablet PLEASE SEE ATTACHED FOR DETAILED DIRECTIONS    BD PosiFlush 0.9 % SOLN     ibuprofen (MOTRIN) 600 mg tablet Take 1 tablet (600 mg total) by mouth every 8 (eight) hours for 10 days    mometasone (NASONEX) 50 mcg/act nasal spray 2 sprays into each nostril as needed     multivitamin (THERAGRAN) TABS Take 1 tablet by mouth daily     No Known Allergies  Objective   /90 (BP Location: Left arm, Patient Position: Sitting)   Pulse 84   Temp 99.3 °F (37.4 °C) (Tympanic)   Ht 5' 7.5\" (1.715 m)   Wt 78.9 kg (174 lb)   LMP 10/28/2024 (Approximate)   SpO2 99%   Breastfeeding No   BMI 26.85 kg/m²     Physical Exam  Vitals and nursing note reviewed.   Constitutional:       General: She is not in acute distress.     Appearance: She is well-developed. She is not diaphoretic.   HENT:      Head: Normocephalic.      Right Ear: Tympanic membrane and external ear normal.      Left Ear: Tympanic membrane and external ear normal.      Nose: No rhinorrhea.      Mouth/Throat:      Pharynx: No posterior oropharyngeal erythema.   Eyes:      General:         Right eye: No discharge.         Left eye: No discharge.      Conjunctiva/sclera: Conjunctivae normal.   Neck:      Vascular: No JVD.   Cardiovascular:      Rate and " Rhythm: Normal rate and regular rhythm.      Heart sounds: Normal heart sounds. No murmur heard.     No gallop.   Pulmonary:      Effort: Pulmonary effort is normal. No respiratory distress.      Breath sounds: Normal breath sounds. No wheezing.   Abdominal:      Palpations: Abdomen is soft.      Tenderness: There is no abdominal tenderness.   Musculoskeletal:         General: No tenderness.      Cervical back: Normal range of motion and neck supple.   Lymphadenopathy:      Cervical: No cervical adenopathy.   Skin:     General: Skin is warm.      Findings: No rash.   Neurological:      Mental Status: She is alert and oriented to person, place, and time.           Veena Willson MD

## 2024-11-20 NOTE — PATIENT INSTRUCTIONS
Pre-operative Medication Instructions    Avoid herbs or non-directed vitamins one week prior to surgery  Avoid aspirin containing medications or non-steroidal anti-inflammatory drugs one week preceding surgery  May take tylenol for pain up until the night before surgery    Diuretics     Medication Name     hydroCHLOROthiazide 25 mg tablet      Continue this medication up to the evening before surgery/procedure, but do not take the morning of the day of surgery.    Opioid Medications     Medication Name     traMADol (ULTRAM) 50 mg tablet     oxyCODONE (ROXICODONE) 5 immediate release tablet (Discontinued)      Continue to take this medication on your normal schedule.  If this is an oral medication and you take it in the morning, then you may take this medicine with a sip of water.

## 2024-11-20 NOTE — PROGRESS NOTES
Pre-operative Clearance  Name: Janice Hollingsworth      : 1990      MRN: [id number]  Encounter Provider: Veena Willson MD  Encounter Date: 2024   Encounter department: [facility]    Assessment & Plan  Benign essential hypertension           Pre-operative Clearance:     Medication Instructions:   - Diuretics: Continue taking this medication up to the evening before surgery/procedure, but do not take in the morning of the day of surgery/procedure.  - Opioids: Continue to take this medication on your normal schedule.       History of Present Illness     Pre-op Exam    Pre-operative Risk Factors:    History of cerebrovascular disease: No    History of ischemic heart disease: No  Pre-operative treatment with insulin: No  Pre-operative creatinine >2 mg/dL: No    History of congestive heart failure: No    Review of Systems  Past Medical History   Past Medical History:   Diagnosis Date    Arthritis     Bariatric surgery status     Chest pain on exertion 2020    Chronic kidney disease     Cough 2021    Depression     Heartburn     Hypertension     Kidney stone     Obesity     Postsurgical malabsorption     Seasonal allergies     Sleep apnea     No cpap    Wears glasses      Past Surgical History:   Procedure Laterality Date    EGD      [place] LAPS GSTR RSTCV PX W/BYP MILTON-EN-Y LIMB <150 CM N/A 12/3/2019    Procedure: ROBOTIC BYPASS GASTRIC MILTON-EN-Y, intraop egd;  Surgeon: Adan Hinton MD;  Location: [facility];  Service: Bariatrics     Family History   Problem Relation Age of Onset    Hypertension Family     Hypertension Mother     Alcohol abuse Father     Diabetes Father     No Known Problems Brother     Heart disease Neg Hx     Thyroid disease Neg Hx     Stroke Neg Hx      Social History     Tobacco Use    Smoking status: Some Days     Current packs/day: 0.50     Average packs/day: 0.5 packs/day for 24.5 years (12.3 ttl pk-yrs)     Types: Cigarettes     Start date:  "5/17/2000    Smokeless tobacco: Never   Vaping Use    Vaping status: Never Used   Substance and Sexual Activity    Alcohol use: Yes     Comment: social    Drug use: No    Sexual activity: Yes     Partners: Male     Current Outpatient Medications on File Prior to Visit   Medication Sig    cephalexin (KEFLEX) 500 mg capsule Take 500 mg by mouth 2 (two) times a day    hydroCHLOROthiazide 25 mg tablet Take 50 mg by mouth daily    traMADol (ULTRAM) 50 mg tablet TAKE 1 TABLET (50 MG TOTAL) BY MOUTH EVERY 6 HOURS AS NEEDED FOR MODERATE PAIN (PAIN SCORE 4-6)    Acetaminophen Extra Strength 500 MG tablet PLEASE SEE ATTACHED FOR DETAILED DIRECTIONS    BD PosiFlush 0.9 % SOLN     ibuprofen (MOTRIN) 600 mg tablet Take 1 tablet (600 mg total) by mouth every 8 (eight) hours for 10 days    mometasone (NASONEX) 50 mcg/act nasal spray 2 sprays into each nostril as needed     multivitamin (THERAGRAN) TABS Take 1 tablet by mouth daily    [DISCONTINUED] oxybutynin (DITROPAN) 5 mg tablet Take 5 mg by mouth Three times daily as needed    [DISCONTINUED] oxyCODONE (ROXICODONE) 5 immediate release tablet Take 5 mg by mouth every 4 (four) hours as needed    [DISCONTINUED] tamsulosin (FLOMAX) 0.4 mg Take 0.4 mg by mouth     No Known Allergies  Objective   /90 (BP Location: Left arm, Patient Position: Sitting)   Pulse 84   Temp 99.3 °F (37.4 °C) (Tympanic)   Ht 5' 7.5\" (1.715 m)   Wt 78.9 kg (174 lb)   LMP 10/28/2024 (Approximate)   SpO2 99%   Breastfeeding No   BMI 26.85 kg/m²     Physical Exam      Veena Willson MD  "

## 2024-11-21 DIAGNOSIS — R10.9 ACUTE FLANK PAIN: Primary | ICD-10-CM

## 2024-11-21 PROBLEM — J02.9 SORE THROAT: Status: RESOLVED | Noted: 2020-09-04 | Resolved: 2024-11-21

## 2024-11-21 PROBLEM — E66.3 OVERWEIGHT WITH BODY MASS INDEX (BMI) OF 28 TO 28.9 IN ADULT: Status: RESOLVED | Noted: 2018-10-04 | Resolved: 2024-11-21

## 2024-11-21 PROBLEM — R68.89 FLU-LIKE SYMPTOMS: Status: RESOLVED | Noted: 2022-06-14 | Resolved: 2024-11-21

## 2024-11-21 PROBLEM — Z01.818 PRE-OP EXAMINATION: Status: ACTIVE | Noted: 2024-11-21

## 2024-11-21 RX ORDER — NAPROXEN 250 MG/1
250 TABLET ORAL 2 TIMES DAILY PRN
Qty: 30 TABLET | Refills: 0 | Status: SHIPPED | OUTPATIENT
Start: 2024-11-21

## 2024-11-21 NOTE — TELEPHONE ENCOUNTER
Patient states the only reason urology prescribed the tramadol was because she didn't have pcp listed at the time because of lapse in insurance was advised once insurance re instated medication needed to be given by primary please advise

## 2024-11-21 NOTE — TELEPHONE ENCOUNTER
Spoke to patient advised provider will send naprosyn to pharmacy however if that does not help to reach out to urology for pain management due to kidney stones

## 2024-11-21 NOTE — ASSESSMENT & PLAN NOTE
Patient is scheduled to have right percutaneous nephrolithotomy on November 25 recommend to do EKG patient will do EKG tomorrow at her job review preop clearance including blood work and her vital sign blood pressure in the office [id number] I repeated personally it is 170/100 reviewed patient chart she had multiple reading of uncontrolled blood pressure patient not have history of hypertension currently was not on medication secondary to insurance she is on hydrochlorothiazide 25 mg will recommend to add atenolol will hold on the clearance until the blood pressure control discussed with the patient        the EKG is unchanged from prior EKG

## 2024-11-22 ENCOUNTER — APPOINTMENT (OUTPATIENT)
Dept: LAB | Facility: MEDICAL CENTER | Age: 34
End: 2024-11-22
Payer: COMMERCIAL

## 2024-11-22 ENCOUNTER — OFFICE VISIT (OUTPATIENT)
Dept: FAMILY MEDICINE CLINIC | Facility: CLINIC | Age: 34
End: 2024-11-22
Payer: COMMERCIAL

## 2024-11-22 VITALS
HEIGHT: 68 IN | BODY MASS INDEX: 26.67 KG/M2 | DIASTOLIC BLOOD PRESSURE: 90 MMHG | TEMPERATURE: 97.9 F | OXYGEN SATURATION: 99 % | HEART RATE: 59 BPM | SYSTOLIC BLOOD PRESSURE: 140 MMHG | WEIGHT: 176 LBS

## 2024-11-22 DIAGNOSIS — I10 BENIGN ESSENTIAL HYPERTENSION: ICD-10-CM

## 2024-11-22 DIAGNOSIS — Z72.0 TOBACCO USE: ICD-10-CM

## 2024-11-22 DIAGNOSIS — Z01.818 PRE-OP EXAMINATION: Primary | ICD-10-CM

## 2024-11-22 DIAGNOSIS — Z01.818 PRE-OP EXAMINATION: ICD-10-CM

## 2024-11-22 DIAGNOSIS — Z00.01 ENCOUNTER FOR WELL ADULT EXAM WITH ABNORMAL FINDINGS: Primary | ICD-10-CM

## 2024-11-22 LAB
ATRIAL RATE: 64 BPM
P AXIS: 69 DEGREES
PR INTERVAL: 142 MS
QRS AXIS: 66 DEGREES
QRSD INTERVAL: 84 MS
QT INTERVAL: 440 MS
QTC INTERVAL: 453 MS
T WAVE AXIS: 83 DEGREES
VENTRICULAR RATE: 64 BPM

## 2024-11-22 PROCEDURE — 99395 PREV VISIT EST AGE 18-39: CPT | Performed by: FAMILY MEDICINE

## 2024-11-22 PROCEDURE — 93010 ELECTROCARDIOGRAM REPORT: CPT | Performed by: INTERNAL MEDICINE

## 2024-11-22 PROCEDURE — 99214 OFFICE O/P EST MOD 30 MIN: CPT | Performed by: FAMILY MEDICINE

## 2024-11-22 NOTE — ASSESSMENT & PLAN NOTE
Chronic uncontrolled she is working to cut down number of the cigarette aware of the complication of smoking

## 2024-11-22 NOTE — ASSESSMENT & PLAN NOTE
Advice and education were given regarding nutrition, aerobic exercises, weight-bearing exercises, cardiovascular risk reduction, fall risk reduction, and age-appropriate supplements.     The patient was counseled regarding instructions for management, risk factor reductions, prognosis, risks and benefits of treatment options, patient and family education, and importance of compliance with treatment.  Patient due for cervical cancer screening she will follow-up with her GYN

## 2024-11-22 NOTE — ASSESSMENT & PLAN NOTE
Patient is scheduled to have right percutaneous nephrolithotomy November 25 EKG review compared to previous EKG no changes patient blood pressure today 140/90 patient will be cleared for the surgery

## 2024-11-22 NOTE — ASSESSMENT & PLAN NOTE
Chronic slight improved compared with before blood pressure today in the office 140/90 recommend the patient to continue atenolol 50 mg hydrochlorothiazide 25 mg once a day low-salt diet important lose weight review plan to reevaluate after the surgery

## 2024-11-22 NOTE — PROGRESS NOTES
Adult Annual Physical  Name: Janice Hollingsworth      : 1990      MRN: [id number]  Encounter Provider: Veena Willson MD  Encounter Date: 2024   Encounter department: [facility]    Assessment & Plan  Encounter for well adult exam with abnormal findings  Advice and education were given regarding nutrition, aerobic exercises, weight-bearing exercises, cardiovascular risk reduction, fall risk reduction, and age-appropriate supplements.     The patient was counseled regarding instructions for management, risk factor reductions, prognosis, risks and benefits of treatment options, patient and family education, and importance of compliance with treatment.  Patient due for cervical cancer screening she will follow-up with her GYN       Tobacco use  Chronic uncontrolled she is working to cut down number of the cigarette aware of the complication of smoking       Benign essential hypertension  Chronic slight improved compared with before blood pressure today in the office 140/90 recommend the patient to continue atenolol 50 mg hydrochlorothiazide 25 mg once a day low-salt diet important lose weight review plan to reevaluate after the surgery       Pre-op examination  Patient is scheduled to have right percutaneous nephrolithotomy  EKG review compared to previous EKG no changes patient blood pressure today 140/90 patient will be cleared for the surgery       Immunizations and preventive care screenings were discussed with patient today. Appropriate education was printed on patient's after visit summary.    Counseling:  Alcohol/drug use: discussed moderation in alcohol intake, the recommendations for healthy alcohol use, and avoidance of illicit drug use.  Dental Health: discussed importance of regular tooth brushing, flossing, and dental visits.  Injury prevention: discussed safety/seat belts, safety helmets, smoke detectors, carbon monoxide detectors, and smoking near  bedding or upholstery.  Sexual health: discussed sexually transmitted diseases, partner selection, use of condoms, avoidance of unintended pregnancy, and contraceptive alternatives.  Exercise: the importance of regular exercise/physical activity was discussed. Recommend exercise 3-5 times per week for at least 30 minutes.       Tobacco Cessation Counseling: Tobacco cessation counseling was provided. The patient is sincerely urged to quit consumption of tobacco. She is not ready to quit tobacco.         History of Present Illness patient here for physical exam and follow-up with the blood pressure patient was scheduled to have his surgery on November 25 of blood pressure was not controlled we will start her on atenolol 50 mg and she continue to take hydrochlorothiazide 25 mg once a day she tolerated medication well blood pressure improved compared with before denies any chest pain short of breath no palpitation no dyspnea on exertion no lower extremity edema had the EKG done review past medical surgical family and social history review      Adult Annual Physical:  Patient presents for annual physical.     Diet and Physical Activity:  - Diet/Nutrition:. bariatric diet  - Exercise: walking.    General Health:    - Hearing: normal hearing bilateral ears.  - Vision: wears glasses.  - Dental: brushes teeth twice daily.    /GYN Health:  - Follows with GYN: yes.   - Menopause: premenopausal.     Review of Systems   Constitutional:  Negative for chills and fever.   HENT:  Negative for ear pain and sore throat.    Eyes:  Negative for pain and visual disturbance.   Respiratory:  Negative for cough and shortness of breath.    Cardiovascular:  Negative for chest pain and palpitations.   Gastrointestinal:  Negative for abdominal pain, constipation, diarrhea and vomiting.   Genitourinary:  Negative for dysuria and hematuria.   Musculoskeletal:  Negative for gait problem.   Skin:  Negative for color change and rash.  "  Neurological:  Negative for seizures and syncope.   All other systems reviewed and are negative.        Objective   /90   Pulse 59   Temp 97.9 °F (36.6 °C) (Tympanic)   Ht 5' 7.5\" (1.715 m)   Wt 79.8 kg (176 lb)   LMP 10/28/2024 (Approximate)   SpO2 99%   BMI 27.16 kg/m²     Physical Exam  Vitals and nursing note reviewed.   Constitutional:       General: She is not in acute distress.     Appearance: She is well-developed. She is not diaphoretic.   HENT:      Head: Normocephalic.      Right Ear: Tympanic membrane and external ear normal.      Left Ear: Tympanic membrane and external ear normal.      Nose: No rhinorrhea.      Mouth/Throat:      Pharynx: No posterior oropharyngeal erythema.   Eyes:      General:         Right eye: No discharge.         Left eye: No discharge.      Conjunctiva/sclera: Conjunctivae normal.   Neck:      Vascular: No JVD.   Cardiovascular:      Rate and Rhythm: Normal rate and regular rhythm.      Heart sounds: Normal heart sounds. No murmur heard.     No gallop.   Pulmonary:      Effort: Pulmonary effort is normal. No respiratory distress.      Breath sounds: Normal breath sounds. No wheezing.   Abdominal:      General: There is no distension.      Palpations: Abdomen is soft.      Tenderness: There is no abdominal tenderness.   Musculoskeletal:         General: No tenderness.      Cervical back: Normal range of motion and neck supple.   Lymphadenopathy:      Cervical: No cervical adenopathy.   Skin:     General: Skin is warm.      Findings: No rash.   Neurological:      Mental Status: She is alert and oriented to person, place, and time.         "

## 2024-12-10 NOTE — TELEPHONE ENCOUNTER
----- Message from Gerhardt Ely, MD sent at 8/27/2019 11:16 AM EDT -----  Please prescribe Prevpack for two weeks If any symptoms as dizziness, Near syncope, syncope patient needs to visit the hospital stat to be evaluated for EP consultation

## 2024-12-23 ENCOUNTER — IMMUNIZATIONS (OUTPATIENT)
Dept: FAMILY MEDICINE CLINIC | Facility: CLINIC | Age: 34
End: 2024-12-23
Payer: COMMERCIAL

## 2024-12-23 DIAGNOSIS — Z23 NEED FOR INFLUENZA VACCINATION: Primary | ICD-10-CM

## 2024-12-23 DIAGNOSIS — I10 UNCONTROLLED HYPERTENSION: ICD-10-CM

## 2024-12-23 PROCEDURE — 90471 IMMUNIZATION ADMIN: CPT

## 2024-12-23 PROCEDURE — 90673 RIV3 VACCINE NO PRESERV IM: CPT

## 2024-12-23 RX ORDER — ATENOLOL 50 MG/1
50 TABLET ORAL DAILY
Qty: 30 TABLET | Refills: 0 | Status: SHIPPED | OUTPATIENT
Start: 2024-12-23

## 2025-01-26 DIAGNOSIS — I10 UNCONTROLLED HYPERTENSION: ICD-10-CM

## 2025-01-26 RX ORDER — ATENOLOL 50 MG/1
50 TABLET ORAL DAILY
Qty: 30 TABLET | Refills: 5 | Status: SHIPPED | OUTPATIENT
Start: 2025-01-26

## 2025-01-28 ENCOUNTER — OFFICE VISIT (OUTPATIENT)
Dept: FAMILY MEDICINE CLINIC | Facility: CLINIC | Age: 35
End: 2025-01-28
Payer: COMMERCIAL

## 2025-01-28 VITALS
WEIGHT: 180 LBS | HEART RATE: 92 BPM | BODY MASS INDEX: 27.28 KG/M2 | SYSTOLIC BLOOD PRESSURE: 120 MMHG | HEIGHT: 68 IN | TEMPERATURE: 100.9 F | OXYGEN SATURATION: 99 % | DIASTOLIC BLOOD PRESSURE: 80 MMHG

## 2025-01-28 DIAGNOSIS — F33.1 MODERATE EPISODE OF RECURRENT MAJOR DEPRESSIVE DISORDER (HCC): ICD-10-CM

## 2025-01-28 DIAGNOSIS — N20.0 NEPHROLITHIASIS: ICD-10-CM

## 2025-01-28 DIAGNOSIS — Z72.0 TOBACCO USE: ICD-10-CM

## 2025-01-28 DIAGNOSIS — R10.9 LEFT FLANK PAIN: ICD-10-CM

## 2025-01-28 DIAGNOSIS — N12 PYELONEPHRITIS: Primary | ICD-10-CM

## 2025-01-28 DIAGNOSIS — R50.9 FEVER, UNSPECIFIED FEVER CAUSE: ICD-10-CM

## 2025-01-28 DIAGNOSIS — R52 GENERALIZED BODY ACHES: ICD-10-CM

## 2025-01-28 LAB
BACTERIA UR QL AUTO: ABNORMAL /HPF
BILIRUB UR QL STRIP: NEGATIVE
CLARITY UR: ABNORMAL
COLOR UR: YELLOW
GLUCOSE UR STRIP-MCNC: ABNORMAL MG/DL
HGB UR QL STRIP.AUTO: ABNORMAL
HYALINE CASTS #/AREA URNS LPF: ABNORMAL /LPF
KETONES UR STRIP-MCNC: NEGATIVE MG/DL
LEUKOCYTE ESTERASE UR QL STRIP: ABNORMAL
MUCOUS THREADS UR QL AUTO: ABNORMAL
NITRITE UR QL STRIP: POSITIVE
NON-SQ EPI CELLS URNS QL MICRO: ABNORMAL /HPF
PH UR STRIP.AUTO: 6.5 [PH]
PROT UR STRIP-MCNC: ABNORMAL MG/DL
RBC #/AREA URNS AUTO: ABNORMAL /HPF
SARS-COV-2 AG UPPER RESP QL IA: NEGATIVE
SL AMB  POCT GLUCOSE, UA: ABNORMAL
SL AMB LEUKOCYTE ESTERASE,UA: ABNORMAL
SL AMB POCT BILIRUBIN,UA: ABNORMAL
SL AMB POCT BLOOD,UA: ABNORMAL
SL AMB POCT CLARITY,UA: ABNORMAL
SL AMB POCT COLOR,UA: ABNORMAL
SL AMB POCT KETONES,UA: ABNORMAL
SL AMB POCT NITRITE,UA: POSITIVE
SL AMB POCT PH,UA: 6
SL AMB POCT RAPID FLU A: NORMAL
SL AMB POCT RAPID FLU B: NORMAL
SL AMB POCT SPECIFIC GRAVITY,UA: 1.02
SL AMB POCT URINE PROTEIN: ABNORMAL
SL AMB POCT UROBILINOGEN: 1
SP GR UR STRIP.AUTO: 1.02 (ref 1–1.03)
UROBILINOGEN UR STRIP-ACNC: <2 MG/DL
VALID CONTROL: NORMAL
WBC #/AREA URNS AUTO: ABNORMAL /HPF

## 2025-01-28 PROCEDURE — 81002 URINALYSIS NONAUTO W/O SCOPE: CPT | Performed by: FAMILY MEDICINE

## 2025-01-28 PROCEDURE — 81001 URINALYSIS AUTO W/SCOPE: CPT | Performed by: FAMILY MEDICINE

## 2025-01-28 PROCEDURE — 87811 SARS-COV-2 COVID19 W/OPTIC: CPT | Performed by: FAMILY MEDICINE

## 2025-01-28 PROCEDURE — 87086 URINE CULTURE/COLONY COUNT: CPT | Performed by: FAMILY MEDICINE

## 2025-01-28 PROCEDURE — 99214 OFFICE O/P EST MOD 30 MIN: CPT | Performed by: FAMILY MEDICINE

## 2025-01-28 PROCEDURE — 87804 INFLUENZA ASSAY W/OPTIC: CPT | Performed by: FAMILY MEDICINE

## 2025-01-28 PROCEDURE — 87186 SC STD MICRODIL/AGAR DIL: CPT | Performed by: FAMILY MEDICINE

## 2025-01-28 PROCEDURE — 87077 CULTURE AEROBIC IDENTIFY: CPT | Performed by: FAMILY MEDICINE

## 2025-01-28 RX ORDER — NITROFURANTOIN 25; 75 MG/1; MG/1
100 CAPSULE ORAL 2 TIMES DAILY
Qty: 10 CAPSULE | Refills: 0 | Status: SHIPPED | OUTPATIENT
Start: 2025-01-28 | End: 2025-02-02

## 2025-01-28 RX ORDER — FLUOXETINE 10 MG/1
10 CAPSULE ORAL DAILY
Qty: 30 CAPSULE | Refills: 1 | Status: SHIPPED | OUTPATIENT
Start: 2025-01-28

## 2025-01-28 NOTE — PROGRESS NOTES
Name: Janice Hollingsworth      : 1990      MRN: [id number]  Encounter Provider: Veena Willson MD  Encounter Date: 2025   Encounter department: [facility]  :  Assessment & Plan  Pyelonephritis  Acute symptomatic abnormal urine dip in the office positive for CVA tenderness and left flank area with a fever recommend to send the urine for UA and CS recommend to start the antibiotic Macrobid 100 twice a day proper use and possible side effect review in case get worse to go to the emergency room  Orders:  •  nitrofurantoin (MACROBID) 100 mg capsule; Take 1 capsule (100 mg total) by mouth 2 (two) times a day for 5 days  •  CBC and differential; Future  •  Comprehensive metabolic panel; Future  •  Lipid panel; Future  •  TSH, 3rd generation with Free T4 reflex; Future    Left flank pain  Acute symptomatic patient recently had an ultrasound show she had left nephrolithiasis patient had history of kidney stone on the right side follow-up with the urology recommend the patient to call urology for appointment  Orders:  •  UA (URINE) with reflex to Scope  •  Urine culture  •  POCT urine dip  •  CBC and differential; Future  •  Comprehensive metabolic panel; Future  •  Lipid panel; Future  •  TSH, 3rd generation with Free T4 reflex; Future  •  Urine Microscopic    Tobacco use  Patient aware of the complications smoking not ready to quit smoking yet  Orders:  •  CBC and differential; Future  •  Comprehensive metabolic panel; Future  •  Lipid panel; Future  •  TSH, 3rd generation with Free T4 reflex; Future    Nephrolithiasis  The patient has a history of nephrolithiasis in the right side recently in  ultrasound that showed she had the left side patient already follow-up with the urology periodically       Moderate episode of recurrent major depressive disorder (HCC)  Depression Screening Follow-up Plan: Patient's depression screening was positive with a PHQ-9 score of 13.      Symptomatic recommend to start Prozac 10 mg once a day proper use and possible side effect review    Orders:  •  FLUoxetine (PROzac) 10 mg capsule; Take 1 capsule (10 mg total) by mouth daily  •  CBC and differential; Future  •  Comprehensive metabolic panel; Future  •  Lipid panel; Future  •  TSH, 3rd generation with Free T4 reflex; Future    Fever, unspecified fever cause    Orders:  •  POCT Rapid Covid Ag  •  POCT rapid flu A and B  •  CBC and differential; Future  •  Comprehensive metabolic panel; Future  •  Lipid panel; Future  •  TSH, 3rd generation with Free T4 reflex; Future    Generalized body aches    Orders:  •  POCT Rapid Covid Ag  •  POCT rapid flu A and B  •  CBC and differential; Future  •  Comprehensive metabolic panel; Future  •  Lipid panel; Future  •  TSH, 3rd generation with Free T4 reflex; Future          Depression Screening and Follow-up Plan: Patient's depression screening was positive with a PHQ-9 score of 13.   Patient assessed for underlying major depression. Brief counseling provided and recommend additional follow-up/re-evaluation next office visit.   Tobacco Cessation Counseling: Tobacco cessation counseling was provided. The patient is sincerely urged to quit consumption of tobacco. She is not ready to quit tobacco.       History of Present Illness   Patient who noted to have history of right kidney stone came to the office concerned about left flank area it has been going on for couple days associated with fever chills and the pain radiated to her lower back no blood in the urine no cough no wheezing she had generalized body ache fatigue and fever recently she had ultrasound of the kidney at the [facility] result review      Review of Systems   Constitutional:  Positive for fatigue. Negative for chills and fever.   HENT:  Negative for ear pain and sore throat.    Eyes:  Negative for pain and visual disturbance.   Respiratory:  Negative for cough and shortness of breath.   "  Cardiovascular:  Negative for chest pain and palpitations.   Gastrointestinal:  Negative for abdominal pain, constipation, diarrhea and vomiting.   Genitourinary:  Positive for flank pain. Negative for dysuria and hematuria.   Musculoskeletal:  Positive for back pain. Negative for arthralgias.   Skin:  Negative for color change and rash.   Neurological:  Negative for seizures and syncope.   All other systems reviewed and are negative.      Objective   /80 (BP Location: Left arm, Patient Position: Sitting)   Pulse 92   Temp (!) 100.9 °F (38.3 °C) (Tympanic)   Ht 5' 8\" (1.727 m)   Wt 81.6 kg (180 lb)   LMP 01/14/2025 (Approximate)   SpO2 99%   Breastfeeding No   BMI 27.37 kg/m²      Physical Exam  Vitals and nursing note reviewed.   Constitutional:       General: She is not in acute distress.     Appearance: She is well-developed. She is not diaphoretic.   HENT:      Head: Normocephalic.      Right Ear: Tympanic membrane and external ear normal.      Left Ear: Tympanic membrane and external ear normal.      Nose: No rhinorrhea.      Mouth/Throat:      Pharynx: No posterior oropharyngeal erythema.   Eyes:      General:         Right eye: No discharge.         Left eye: No discharge.      Conjunctiva/sclera: Conjunctivae normal.   Neck:      Vascular: No JVD.   Cardiovascular:      Rate and Rhythm: Normal rate and regular rhythm.      Heart sounds: Normal heart sounds. No murmur heard.     No gallop.   Pulmonary:      Effort: Pulmonary effort is normal. No respiratory distress.      Breath sounds: Normal breath sounds. No wheezing.   Abdominal:      General: There is no distension.      Palpations: Abdomen is soft.      Tenderness: There is no abdominal tenderness. There is left CVA tenderness. There is no rebound.   Musculoskeletal:         General: No tenderness.      Cervical back: Normal range of motion and neck supple.   Lymphadenopathy:      Cervical: No cervical adenopathy.   Skin:     General: " Skin is warm.      Findings: No rash.   Neurological:      Mental Status: She is alert and oriented to person, place, and time.

## 2025-01-28 NOTE — ASSESSMENT & PLAN NOTE
Depression Screening Follow-up Plan: Patient's depression screening was positive with a PHQ-9 score of 13.     Symptomatic recommend to start Prozac 10 mg once a day proper use and possible side effect review    Orders:  •  FLUoxetine (PROzac) 10 mg capsule; Take 1 capsule (10 mg total) by mouth daily  •  CBC and differential; Future  •  Comprehensive metabolic panel; Future  •  Lipid panel; Future  •  TSH, 3rd generation with Free T4 reflex; Future

## 2025-01-28 NOTE — ASSESSMENT & PLAN NOTE
Patient aware of the complications smoking not ready to quit smoking yet  Orders:  •  CBC and differential; Future  •  Comprehensive metabolic panel; Future  •  Lipid panel; Future  •  TSH, 3rd generation with Free T4 reflex; Future

## 2025-01-30 PROBLEM — N12 PYELONEPHRITIS: Status: ACTIVE | Noted: 2025-01-30

## 2025-01-30 NOTE — ASSESSMENT & PLAN NOTE
The patient has a history of nephrolithiasis in the right side recently in January 26 ultrasound that showed she had the left side patient already follow-up with the urology periodically

## 2025-01-30 NOTE — ASSESSMENT & PLAN NOTE
Acute symptomatic abnormal urine dip in the office positive for CVA tenderness and left flank area with a fever recommend to send the urine for UA and CS recommend to start the antibiotic Macrobid 100 twice a day proper use and possible side effect review in case get worse to go to the emergency room  Orders:  •  nitrofurantoin (MACROBID) 100 mg capsule; Take 1 capsule (100 mg total) by mouth 2 (two) times a day for 5 days  •  CBC and differential; Future  •  Comprehensive metabolic panel; Future  •  Lipid panel; Future  •  TSH, 3rd generation with Free T4 reflex; Future

## 2025-01-30 NOTE — ASSESSMENT & PLAN NOTE
Acute symptomatic patient recently had an ultrasound show she had left nephrolithiasis patient had history of kidney stone on the right side follow-up with the urology recommend the patient to call urology for appointment  Orders:  •  UA (URINE) with reflex to Scope  •  Urine culture  •  POCT urine dip  •  CBC and differential; Future  •  Comprehensive metabolic panel; Future  •  Lipid panel; Future  •  TSH, 3rd generation with Free T4 reflex; Future  •  Urine Microscopic

## 2025-01-31 ENCOUNTER — RESULTS FOLLOW-UP (OUTPATIENT)
Dept: FAMILY MEDICINE CLINIC | Facility: CLINIC | Age: 35
End: 2025-01-31

## 2025-01-31 NOTE — RESULT ENCOUNTER NOTE
Lm to let pt know that urine culture was positive. Continue macrobid which was already prescribed last visit

## 2025-01-31 NOTE — TELEPHONE ENCOUNTER
Patient called in due to a missed call - Per notes patient did test positive for for UTI - Patient stated that she knew that. Patient went to the ER and was put on KEFLEX .

## 2025-02-01 LAB
BACTERIA UR CULT: ABNORMAL
BACTERIA UR CULT: ABNORMAL

## 2025-03-01 PROBLEM — N12 PYELONEPHRITIS: Status: RESOLVED | Noted: 2025-01-30 | Resolved: 2025-03-01

## 2025-03-03 ENCOUNTER — OFFICE VISIT (OUTPATIENT)
Dept: FAMILY MEDICINE CLINIC | Facility: CLINIC | Age: 35
End: 2025-03-03
Payer: COMMERCIAL

## 2025-03-03 VITALS
DIASTOLIC BLOOD PRESSURE: 84 MMHG | BODY MASS INDEX: 28.25 KG/M2 | HEIGHT: 68 IN | HEART RATE: 61 BPM | SYSTOLIC BLOOD PRESSURE: 126 MMHG | TEMPERATURE: 97.8 F | WEIGHT: 186.4 LBS | OXYGEN SATURATION: 99 %

## 2025-03-03 DIAGNOSIS — I10 PRIMARY HYPERTENSION: Primary | ICD-10-CM

## 2025-03-03 DIAGNOSIS — F41.1 GAD (GENERALIZED ANXIETY DISORDER): ICD-10-CM

## 2025-03-03 DIAGNOSIS — Z72.0 TOBACCO USE: ICD-10-CM

## 2025-03-03 DIAGNOSIS — F33.1 MODERATE EPISODE OF RECURRENT MAJOR DEPRESSIVE DISORDER (HCC): ICD-10-CM

## 2025-03-03 PROCEDURE — 99214 OFFICE O/P EST MOD 30 MIN: CPT | Performed by: FAMILY MEDICINE

## 2025-03-03 RX ORDER — BUPROPION HYDROCHLORIDE 150 MG/1
150 TABLET, EXTENDED RELEASE ORAL 2 TIMES DAILY
Qty: 180 TABLET | Refills: 0 | Status: SHIPPED | OUTPATIENT
Start: 2025-03-03

## 2025-03-03 RX ORDER — ALPRAZOLAM 0.25 MG
0.25 TABLET ORAL
Qty: 30 TABLET | Refills: 0 | Status: SHIPPED | OUTPATIENT
Start: 2025-03-03

## 2025-03-06 PROBLEM — F41.1 GAD (GENERALIZED ANXIETY DISORDER): Status: ACTIVE | Noted: 2021-06-16

## 2025-03-06 NOTE — ASSESSMENT & PLAN NOTE
Chronic aware of the complication of smoking patient is ready to quit to smoke we will start her on Wellbutrin 150 mg twice a day proper use and possible side effects review  Orders:  •  buPROPion (WELLBUTRIN SR) 150 mg 12 hr tablet; Take 1 tablet (150 mg total) by mouth 2 (two) times a day

## 2025-03-06 NOTE — ASSESSMENT & PLAN NOTE
Patient history of anxiety with history of recent deaths in the family and agree pain exacerbated affecting his sleep recommend to use the alprazolam 0.25 mg on as needed basis proper use review  Orders:  •  FLUoxetine (PROzac) 20 mg capsule; Take 1 capsule (20 mg total) by mouth daily  •  ALPRAZolam (XANAX) 0.25 mg tablet; Take 1 tablet (0.25 mg total) by mouth daily at bedtime as needed for anxiety

## 2025-03-06 NOTE — PROGRESS NOTES
Name: Janice Hollingsworth      : 1990      MRN: [id number]  Encounter Provider: Veena Willson MD  Encounter Date: 3/3/2025   Encounter department: [facility]  :  Assessment & Plan  Primary hypertension  Chronic asymptomatic improvement of blood pressure compared with before recommend to continue hydrochlorothiazide 25 mg atenolol 50 mg low-salt diet important lose weight review       NELLY (generalized anxiety disorder)  Patient history of anxiety with history of recent deaths in the family and agree pain exacerbated affecting his sleep recommend to use the alprazolam 0.25 mg on as needed basis proper use review  Orders:  •  FLUoxetine (PROzac) 20 mg capsule; Take 1 capsule (20 mg total) by mouth daily  •  ALPRAZolam (XANAX) 0.25 mg tablet; Take 1 tablet (0.25 mg total) by mouth daily at bedtime as needed for anxiety    Tobacco use  Chronic aware of the complication of smoking patient is ready to quit to smoke we will start her on Wellbutrin 150 mg twice a day proper use and possible side effects review  Orders:  •  buPROPion (WELLBUTRIN SR) 150 mg 12 hr tablet; Take 1 tablet (150 mg total) by mouth 2 (two) times a day    Moderate episode of recurrent major depressive disorder (HCC)  Chronic slight better compared with before recommend to increase Prozac to 20 mg once a day             Tobacco Cessation Counseling: Tobacco cessation counseling was provided. The patient is sincerely urged to quit consumption of tobacco. She is ready to quit tobacco. Medication options and side effects of medication discussed. Patient agreed to medication. Bupropion SR was prescribed.       History of Present Illness   Patient here for follow-up of blood pressure well-controlled since been started taking medication atenolol and hydrochlorothiazide happy with the result patient also have history of depression restarted on the Prozac 10 mg slight improved compared with before  patient here for  "follow-up of blood pressure well-controlled since been started taking medication atenolol and hydrochlorothiazide happy with the result patient also have history of depression restarted on the Prozac 10 mg patient is a smoker and she is ready to quit smoking need help she tried patch before and inhaler mammogram and did not help slight improved compared with before but still having sleeping problem especially with the recent deaths in the family and that affected herpatient still had some anxiety and stress with dealing with change in the family and grieving affecting her sleep      Review of Systems   Constitutional:  Negative for chills and fever.   HENT:  Negative for ear pain and sore throat.    Eyes:  Negative for pain and visual disturbance.   Respiratory:  Negative for cough and shortness of breath.    Cardiovascular:  Negative for chest pain and palpitations.   Gastrointestinal:  Negative for abdominal pain, constipation, diarrhea and vomiting.   Genitourinary:  Negative for dysuria and hematuria.   Skin:  Negative for color change and rash.   Neurological:  Negative for seizures and syncope.   Psychiatric/Behavioral:  Positive for sleep disturbance. Negative for self-injury and suicidal ideas. The patient is nervous/anxious.    All other systems reviewed and are negative.      Objective   /84 (BP Location: Left arm, Patient Position: Sitting, Cuff Size: Standard)   Pulse 61   Temp 97.8 °F (36.6 °C) (Tympanic)   Ht 5' 8\" (1.727 m)   Wt 84.6 kg (186 lb 6.4 oz)   SpO2 99%   BMI 28.34 kg/m²      Physical Exam  Vitals and nursing note reviewed.   Constitutional:       General: She is not in acute distress.     Appearance: She is well-developed. She is not diaphoretic.   HENT:      Head: Normocephalic.      Right Ear: Tympanic membrane and external ear normal.      Left Ear: Tympanic membrane and external ear normal.      Nose: No rhinorrhea.      Mouth/Throat:      Pharynx: No posterior oropharyngeal " erythema.   Eyes:      General:         Right eye: No discharge.         Left eye: No discharge.      Conjunctiva/sclera: Conjunctivae normal.   Neck:      Vascular: No JVD.   Cardiovascular:      Rate and Rhythm: Normal rate and regular rhythm.      Heart sounds: Normal heart sounds. No murmur heard.     No gallop.   Pulmonary:      Effort: Pulmonary effort is normal. No respiratory distress.      Breath sounds: Normal breath sounds. No wheezing.   Abdominal:      General: There is no distension.      Palpations: Abdomen is soft.      Tenderness: There is no abdominal tenderness. There is no rebound.   Musculoskeletal:         General: No tenderness.      Cervical back: Normal range of motion and neck supple.   Lymphadenopathy:      Cervical: No cervical adenopathy.   Skin:     General: Skin is warm.      Findings: No rash.   Neurological:      Mental Status: She is alert and oriented to person, place, and time.

## 2025-03-06 NOTE — ASSESSMENT & PLAN NOTE
Chronic asymptomatic improvement of blood pressure compared with before recommend to continue hydrochlorothiazide 25 mg atenolol 50 mg low-salt diet important lose weight review

## 2025-03-30 DIAGNOSIS — I10 UNCONTROLLED HYPERTENSION: ICD-10-CM

## 2025-03-31 RX ORDER — HYDROCHLOROTHIAZIDE 25 MG/1
25 TABLET ORAL DAILY
Qty: 30 TABLET | Refills: 5 | Status: SHIPPED | OUTPATIENT
Start: 2025-03-31

## 2025-05-30 DIAGNOSIS — Z72.0 TOBACCO USE: ICD-10-CM

## 2025-05-30 RX ORDER — BUPROPION HYDROCHLORIDE 150 MG/1
150 TABLET, EXTENDED RELEASE ORAL 2 TIMES DAILY
Qty: 180 TABLET | Refills: 1 | Status: SHIPPED | OUTPATIENT
Start: 2025-05-30

## 2025-06-16 ENCOUNTER — OFFICE VISIT (OUTPATIENT)
Dept: FAMILY MEDICINE CLINIC | Facility: CLINIC | Age: 35
End: 2025-06-16

## 2025-06-16 ENCOUNTER — APPOINTMENT (OUTPATIENT)
Dept: LAB | Facility: MEDICAL CENTER | Age: 35
End: 2025-06-16
Attending: FAMILY MEDICINE

## 2025-06-16 VITALS
HEIGHT: 69 IN | BODY MASS INDEX: 26.66 KG/M2 | SYSTOLIC BLOOD PRESSURE: 120 MMHG | TEMPERATURE: 97.2 F | DIASTOLIC BLOOD PRESSURE: 70 MMHG | WEIGHT: 180 LBS | HEART RATE: 72 BPM | OXYGEN SATURATION: 98 %

## 2025-06-16 DIAGNOSIS — R52 GENERALIZED BODY ACHES: ICD-10-CM

## 2025-06-16 DIAGNOSIS — R79.89 LFT ELEVATION: ICD-10-CM

## 2025-06-16 DIAGNOSIS — F41.1 GAD (GENERALIZED ANXIETY DISORDER): ICD-10-CM

## 2025-06-16 DIAGNOSIS — Z72.0 TOBACCO USE: ICD-10-CM

## 2025-06-16 DIAGNOSIS — R50.9 FEVER, UNSPECIFIED FEVER CAUSE: ICD-10-CM

## 2025-06-16 DIAGNOSIS — R10.9 LEFT FLANK PAIN: ICD-10-CM

## 2025-06-16 DIAGNOSIS — N12 PYELONEPHRITIS: Primary | ICD-10-CM

## 2025-06-16 DIAGNOSIS — N12 PYELONEPHRITIS: ICD-10-CM

## 2025-06-16 DIAGNOSIS — F33.1 MODERATE EPISODE OF RECURRENT MAJOR DEPRESSIVE DISORDER (HCC): ICD-10-CM

## 2025-06-16 LAB
ALBUMIN SERPL BCG-MCNC: 3.9 G/DL (ref 3.5–5)
ALP SERPL-CCNC: 228 U/L (ref 34–104)
ALT SERPL W P-5'-P-CCNC: 64 U/L (ref 7–52)
ANION GAP SERPL CALCULATED.3IONS-SCNC: 9 MMOL/L (ref 4–13)
AST SERPL W P-5'-P-CCNC: 31 U/L (ref 13–39)
BASOPHILS # BLD AUTO: 0.07 THOUSANDS/ÂΜL (ref 0–0.1)
BASOPHILS NFR BLD AUTO: 1 % (ref 0–1)
BILIRUB SERPL-MCNC: 0.39 MG/DL (ref 0.2–1)
BUN SERPL-MCNC: 18 MG/DL (ref 5–25)
CALCIUM SERPL-MCNC: 9.2 MG/DL (ref 8.4–10.2)
CHLORIDE SERPL-SCNC: 99 MMOL/L (ref 96–108)
CHOLEST SERPL-MCNC: 147 MG/DL (ref ?–200)
CO2 SERPL-SCNC: 26 MMOL/L (ref 21–32)
CREAT SERPL-MCNC: 0.63 MG/DL (ref 0.6–1.3)
EOSINOPHIL # BLD AUTO: 0.09 THOUSAND/ÂΜL (ref 0–0.61)
EOSINOPHIL NFR BLD AUTO: 1 % (ref 0–6)
ERYTHROCYTE [DISTWIDTH] IN BLOOD BY AUTOMATED COUNT: 14.1 % (ref 11.6–15.1)
GFR SERPL CREATININE-BSD FRML MDRD: 114 ML/MIN/1.73SQ M
GLUCOSE P FAST SERPL-MCNC: 93 MG/DL (ref 65–99)
HCT VFR BLD AUTO: 39.9 % (ref 34.8–46.1)
HDLC SERPL-MCNC: 36 MG/DL
HGB BLD-MCNC: 12.6 G/DL (ref 11.5–15.4)
IMM GRANULOCYTES # BLD AUTO: 0.14 THOUSAND/UL (ref 0–0.2)
IMM GRANULOCYTES NFR BLD AUTO: 1 % (ref 0–2)
LDLC SERPL CALC-MCNC: 88 MG/DL (ref 0–100)
LYMPHOCYTES # BLD AUTO: 2.2 THOUSANDS/ÂΜL (ref 0.6–4.47)
LYMPHOCYTES NFR BLD AUTO: 18 % (ref 14–44)
MCH RBC QN AUTO: 29.4 PG (ref 26.8–34.3)
MCHC RBC AUTO-ENTMCNC: 31.6 G/DL (ref 31.4–37.4)
MCV RBC AUTO: 93 FL (ref 82–98)
MONOCYTES # BLD AUTO: 1.07 THOUSAND/ÂΜL (ref 0.17–1.22)
MONOCYTES NFR BLD AUTO: 9 % (ref 4–12)
NEUTROPHILS # BLD AUTO: 8.78 THOUSANDS/ÂΜL (ref 1.85–7.62)
NEUTS SEG NFR BLD AUTO: 70 % (ref 43–75)
NONHDLC SERPL-MCNC: 111 MG/DL
NRBC BLD AUTO-RTO: 0 /100 WBCS
PLATELET # BLD AUTO: 480 THOUSANDS/UL (ref 149–390)
PMV BLD AUTO: 10.3 FL (ref 8.9–12.7)
POTASSIUM SERPL-SCNC: 5.1 MMOL/L (ref 3.5–5.3)
PROT SERPL-MCNC: 7.6 G/DL (ref 6.4–8.4)
RBC # BLD AUTO: 4.28 MILLION/UL (ref 3.81–5.12)
SODIUM SERPL-SCNC: 134 MMOL/L (ref 135–147)
TRIGL SERPL-MCNC: 113 MG/DL (ref ?–150)
TSH SERPL DL<=0.05 MIU/L-ACNC: 0.95 UIU/ML (ref 0.45–4.5)
WBC # BLD AUTO: 12.35 THOUSAND/UL (ref 4.31–10.16)

## 2025-06-16 PROCEDURE — 80061 LIPID PANEL: CPT

## 2025-06-16 PROCEDURE — 85025 COMPLETE CBC W/AUTO DIFF WBC: CPT

## 2025-06-16 PROCEDURE — 36415 COLL VENOUS BLD VENIPUNCTURE: CPT

## 2025-06-16 PROCEDURE — 99214 OFFICE O/P EST MOD 30 MIN: CPT | Performed by: FAMILY MEDICINE

## 2025-06-16 PROCEDURE — 80053 COMPREHEN METABOLIC PANEL: CPT

## 2025-06-16 PROCEDURE — 84443 ASSAY THYROID STIM HORMONE: CPT

## 2025-06-16 RX ORDER — ALPRAZOLAM 0.25 MG
0.25 TABLET ORAL
Qty: 30 TABLET | Refills: 0 | Status: SHIPPED | OUTPATIENT
Start: 2025-06-16

## 2025-06-16 RX ORDER — CEPHALEXIN 500 MG/1
500 CAPSULE ORAL 4 TIMES DAILY
COMMUNITY
Start: 2025-06-15 | End: 2025-06-24

## 2025-06-16 RX ORDER — OXYCODONE HYDROCHLORIDE 5 MG/1
5 TABLET ORAL 2 TIMES DAILY PRN
COMMUNITY
Start: 2025-06-14 | End: 2025-06-21

## 2025-06-16 RX ORDER — POLYETHYLENE GLYCOL 3350 17 G/17G
17 POWDER, FOR SOLUTION ORAL DAILY
COMMUNITY
Start: 2025-06-14 | End: 2025-06-21

## 2025-06-16 NOTE — ASSESSMENT & PLAN NOTE
Finding on recent blood work done during hospitalization patient asymptomatic recommend to repeat LFT discussed the patient she agree

## 2025-06-16 NOTE — PROGRESS NOTES
Name: Janice Hollingsworth      : 1990      MRN: [id number]  Encounter Provider: Veena Willson MD  Encounter Date: 2025   Encounter department: [facility] PRIMARY CARE  :  Assessment & Plan  Pyelonephritis  Status post recent hospitalization was admitted in [facility] for flank pain and diagnosed with pyelonephritis urine culture and CAT scan reviewed with the patient she is currently on oral antibiotic patient will follow-up with urology as outpatient       NELLY (generalized anxiety disorder)  Chronic asymptomatic patient usually uses alprazolam on a as needed basis aware of the complication overuse medication  Orders:  •  ALPRAZolam (XANAX) 0.25 mg tablet; Take 1 tablet (0.25 mg total) by mouth daily at bedtime as needed for anxiety    LFT elevation  Finding on recent blood work done during hospitalization patient asymptomatic recommend to repeat LFT discussed the patient she agree         Assessment & Plan           History of Present Illness   Patient here for follow-up patient recently was in the hospital patient known to have history of kidney stone and recently developed flank pain in the left side with a fever she was diagnosed with pyelonephritis and treated with IV antibiotic and discharged home patient denies any fever no blood in the urine and no burning sensation she still have mild flank pain but improved compared to before Hospital record reviewed with the patient      Review of Systems   Constitutional:  Negative for activity change, appetite change, fatigue and fever.   HENT:  Negative for congestion, ear pain, sinus pressure, sinus pain and sore throat.    Eyes:  Negative for discharge and redness.   Respiratory:  Negative for cough, chest tightness and shortness of breath.    Cardiovascular:  Negative for chest pain, palpitations and leg swelling.   Gastrointestinal:  Negative for abdominal pain, constipation and diarrhea.   Genitourinary:  Negative for dysuria, flank pain,  "frequency and hematuria.   Musculoskeletal:  Negative for gait problem.   Skin:  Negative for pallor and rash.   Neurological:  Negative for dizziness, tremors, weakness, numbness and headaches.   Hematological:  Does not bruise/bleed easily.       Objective   /70 (BP Location: Left arm, Patient Position: Sitting)   Pulse 72   Temp (!) 97.2 °F (36.2 °C) (Tympanic)   Ht 5' 8.5\" (1.74 m)   Wt 81.6 kg (180 lb)   LMP 06/13/2025 (Exact Date)   SpO2 98%   Breastfeeding No   BMI 26.97 kg/m²      Physical Exam  Vitals and nursing note reviewed.   Constitutional:       General: She is not in acute distress.     Appearance: She is well-developed. She is not diaphoretic.   HENT:      Head: Normocephalic.      Right Ear: Tympanic membrane and external ear normal.      Left Ear: Tympanic membrane and external ear normal.      Nose: No rhinorrhea.      Mouth/Throat:      Pharynx: No posterior oropharyngeal erythema.     Eyes:      General:         Right eye: No discharge.         Left eye: No discharge.      Conjunctiva/sclera: Conjunctivae normal.     Neck:      Vascular: No JVD.     Cardiovascular:      Rate and Rhythm: Normal rate and regular rhythm.      Heart sounds: Normal heart sounds. No murmur heard.     No gallop.   Pulmonary:      Effort: Pulmonary effort is normal. No respiratory distress.      Breath sounds: Normal breath sounds. No wheezing.   Abdominal:      General: There is no distension.      Palpations: Abdomen is soft.      Tenderness: There is no abdominal tenderness. There is no rebound.     Musculoskeletal:         General: No tenderness.      Cervical back: Normal range of motion and neck supple.   Lymphadenopathy:      Cervical: No cervical adenopathy.     Skin:     General: Skin is warm.      Findings: No rash.     Neurological:      Mental Status: She is alert and oriented to person, place, and time.         "

## 2025-06-16 NOTE — ASSESSMENT & PLAN NOTE
Status post recent hospitalization was admitted in [facility] for flank pain and diagnosed with pyelonephritis urine culture and CAT scan reviewed with the patient she is currently on oral antibiotic patient will follow-up with urology as outpatient

## 2025-06-16 NOTE — ASSESSMENT & PLAN NOTE
Chronic asymptomatic patient usually uses alprazolam on a as needed basis aware of the complication overuse medication  Orders:  •  ALPRAZolam (XANAX) 0.25 mg tablet; Take 1 tablet (0.25 mg total) by mouth daily at bedtime as needed for anxiety

## 2025-07-09 DIAGNOSIS — F41.1 GAD (GENERALIZED ANXIETY DISORDER): ICD-10-CM

## 2025-07-09 DIAGNOSIS — I10 UNCONTROLLED HYPERTENSION: ICD-10-CM

## 2025-07-09 RX ORDER — ATENOLOL 50 MG/1
50 TABLET ORAL DAILY
Qty: 30 TABLET | Refills: 2 | Status: SHIPPED | OUTPATIENT
Start: 2025-07-09 | End: 2025-07-09 | Stop reason: SDUPTHER

## 2025-07-09 RX ORDER — ATENOLOL 50 MG/1
50 TABLET ORAL DAILY
Qty: 30 TABLET | Refills: 2 | Status: SHIPPED | OUTPATIENT
Start: 2025-07-09

## 2025-07-16 PROBLEM — N12 PYELONEPHRITIS: Status: RESOLVED | Noted: 2025-06-16 | Resolved: 2025-07-16

## 2025-08-13 ENCOUNTER — TELEPHONE (OUTPATIENT)
Age: 35
End: 2025-08-13

## 2025-08-14 ENCOUNTER — APPOINTMENT (OUTPATIENT)
Dept: LAB | Facility: MEDICAL CENTER | Age: 35
End: 2025-08-14
Attending: FAMILY MEDICINE

## 2025-08-18 DIAGNOSIS — R82.79 POSITIVE URINE CULTURE: Primary | ICD-10-CM

## 2025-08-19 RX ORDER — LEVOFLOXACIN 500 MG/1
500 TABLET, FILM COATED ORAL EVERY 24 HOURS
Qty: 7 TABLET | Refills: 0 | Status: SHIPPED | OUTPATIENT
Start: 2025-08-19 | End: 2025-08-26

## (undated) DEVICE — TRAVELKIT CONTAINS FIRST STEP KIT (200ML EP-4 KIT) AND SOILED SCOPE BAG - 1 KIT: Brand: TRAVELKIT CONTAINS FIRST STEP KIT AND SOILED SCOPE BAG

## (undated) DEVICE — PENCIL ELECTROSURG E-Z CLEAN -0035H

## (undated) DEVICE — Device: Brand: DEFENDO AIR/WATER/SUCTION AND BIOPSY VALVE

## (undated) DEVICE — 10 MM BABCOCKS WITH RATCHET HANDLES: Brand: ENDOPATH

## (undated) DEVICE — TIP COVER ACCESSORY

## (undated) DEVICE — SYRINGE 30ML LL

## (undated) DEVICE — MEGA SUTURECUT ND: Brand: ENDOWRIST

## (undated) DEVICE — 2000CC GUARDIAN II: Brand: GUARDIAN

## (undated) DEVICE — SEAL

## (undated) DEVICE — SUT ETHIBOND 0 CT-1 30 IN X424H

## (undated) DEVICE — ANTI-FOG SOLUTION WITH FOAM PAD: Brand: DEVON

## (undated) DEVICE — NEEDLE SPINAL18G X 3.5 IN QUINCKE

## (undated) DEVICE — 3000CC GUARDIAN II: Brand: GUARDIAN

## (undated) DEVICE — VISIGI 3D®  CALIBRATION SYSTEM  SIZE 36FR BYPASS/SHORT: Brand: BOEHRINGER® VISIGI 3D®  CALIBRATION SYSTEM  SIZE 36FR BYPASS/SHORT

## (undated) DEVICE — REDUCER: Brand: ENDOWRIST

## (undated) DEVICE — BLADELESS OBTURATOR: Brand: WECK VISTA

## (undated) DEVICE — CHLORAPREP HI-LITE 26ML ORANGE

## (undated) DEVICE — BLUE HEAT SCOPE WARMER

## (undated) DEVICE — TUBING SUCTION 5MM X 12 FT

## (undated) DEVICE — COTTON TIP APPLICTOR 2 PK

## (undated) DEVICE — STAPLER 60 RELOAD GREEN: Brand: SUREFORM

## (undated) DEVICE — INTENDED FOR TISSUE SEPARATION, AND OTHER PROCEDURES THAT REQUIRE A SHARP SURGICAL BLADE TO PUNCTURE OR CUT.: Brand: BARD-PARKER SAFETY BLADES SIZE 15, STERILE

## (undated) DEVICE — TUBING SMOKE EVAC W/FILTRATION DEVICE PLUMEPORT ACTIV

## (undated) DEVICE — CADIERE FORCEPS: Brand: ENDOWRIST

## (undated) DEVICE — TIP-UP FENESTRATED GRASPER: Brand: ENDOWRIST

## (undated) DEVICE — [HIGH FLOW INSUFFLATOR,  DO NOT USE IF PACKAGE IS DAMAGED,  KEEP DRY,  KEEP AWAY FROM SUNLIGHT,  PROTECT FROM HEAT AND RADIOACTIVE SOURCES.]: Brand: PNEUMOSURE

## (undated) DEVICE — MONOPOLAR CURVED SCISSORS: Brand: ENDOWRIST

## (undated) DEVICE — SUT MONOCRYL 4-0 PS-2 27 IN Y426H

## (undated) DEVICE — IRRIG ENDO FLO TUBING

## (undated) DEVICE — PBT NON ABSORBABLE WOUND CLOSURE DEVICE: Brand: V-LOC

## (undated) DEVICE — TROCAR: Brand: KII FIOS FIRST ENTRY

## (undated) DEVICE — ABSORBABLE WOUND CLOSURE DEVICE: Brand: V-LOC 90

## (undated) DEVICE — GLOVE SRG BIOGEL 7.5

## (undated) DEVICE — ARM DRAPE

## (undated) DEVICE — DRAPE TOWEL: Brand: CONVERTORS

## (undated) DEVICE — SURGICEL NU-KNIT 3 X 4

## (undated) DEVICE — ENDOPATH PNEUMONEEDLE INSUFFLATION NEEDLES WITH LUER LOCK CONNECTORS 120MM: Brand: ENDOPATH

## (undated) DEVICE — STAPLER 60 RELOAD WHITE: Brand: SUREFORM

## (undated) DEVICE — VESSEL SEALER EXTEND: Brand: ENDOWRIST

## (undated) DEVICE — URETERAL CATHETER ADAPTOR TIP

## (undated) DEVICE — WEBRIL 6 IN UNSTERILE

## (undated) DEVICE — VIOLET BRAIDED (POLYGLACTIN 910), SYNTHETIC ABSORBABLE SUTURE: Brand: COATED VICRYL

## (undated) DEVICE — SYRINGE 20ML LL

## (undated) DEVICE — STAPLER 60 RELOAD BLUE: Brand: SUREFORM

## (undated) DEVICE — SCD SEQUENTIAL COMPRESSION COMFORT SLEEVE MEDIUM KNEE LENGTH: Brand: KENDALL SCD

## (undated) DEVICE — CANNULA SEAL

## (undated) DEVICE — STAPLER CANNULA SEAL: Brand: ENDOWRIST

## (undated) DEVICE — COLUMN DRAPE

## (undated) DEVICE — TIBURON LAPAROSCOPIC ABDOMINAL DRAPE: Brand: CONVERTORS

## (undated) DEVICE — BAG DECANTER

## (undated) DEVICE — ADHESIVE SKIN CLSR DERMABOND NX

## (undated) DEVICE — BETHLEHEM UNIVERSAL MINOR GEN: Brand: CARDINAL HEALTH

## (undated) DEVICE — ASTOUND STANDARD SURGICAL GOWN, XL: Brand: CONVERTORS